# Patient Record
Sex: FEMALE | Race: WHITE | NOT HISPANIC OR LATINO | Employment: FULL TIME | URBAN - METROPOLITAN AREA
[De-identification: names, ages, dates, MRNs, and addresses within clinical notes are randomized per-mention and may not be internally consistent; named-entity substitution may affect disease eponyms.]

---

## 2022-05-19 ENCOUNTER — HOSPITAL ENCOUNTER (EMERGENCY)
Facility: HOSPITAL | Age: 27
Discharge: HOME | End: 2022-05-20
Attending: EMERGENCY MEDICINE
Payer: COMMERCIAL

## 2022-05-19 VITALS
DIASTOLIC BLOOD PRESSURE: 79 MMHG | HEIGHT: 63 IN | SYSTOLIC BLOOD PRESSURE: 141 MMHG | RESPIRATION RATE: 16 BRPM | BODY MASS INDEX: 30.12 KG/M2 | WEIGHT: 170 LBS | HEART RATE: 101 BPM | OXYGEN SATURATION: 97 % | TEMPERATURE: 98.5 F

## 2022-05-19 DIAGNOSIS — T63.481A ALLERGIC REACTION TO INSECT STING, ACCIDENTAL OR UNINTENTIONAL, INITIAL ENCOUNTER: Primary | ICD-10-CM

## 2022-05-19 PROCEDURE — 99283 EMERGENCY DEPT VISIT LOW MDM: CPT

## 2022-05-19 PROCEDURE — 99284 EMERGENCY DEPT VISIT MOD MDM: CPT | Performed by: EMERGENCY MEDICINE

## 2022-05-19 RX ORDER — HYDROXYZINE HYDROCHLORIDE 25 MG/1
25 TABLET, FILM COATED ORAL ONCE
Status: COMPLETED | OUTPATIENT
Start: 2022-05-20 | End: 2022-05-20

## 2022-05-19 NOTE — Clinical Note
Eddi Burch was seen and treated in our emergency department on 5/19/2022  Diagnosis:     Tico Vargas  may return to work on return date  She may return on this date: 05/21/2022         If you have any questions or concerns, please don't hesitate to call        Rosa Elena Montes, DO    ______________________________           _______________          _______________  Hospital Representative                              Date                                Time

## 2022-05-20 RX ORDER — LEVOTHYROXINE SODIUM 0.03 MG/1
25 TABLET ORAL DAILY
COMMUNITY

## 2022-05-20 RX ORDER — HYDROXYZINE HYDROCHLORIDE 25 MG/1
25 TABLET, FILM COATED ORAL EVERY 6 HOURS PRN
Qty: 16 TABLET | Refills: 0 | Status: SHIPPED | OUTPATIENT
Start: 2022-05-19

## 2022-05-20 RX ORDER — DULOXETIN HYDROCHLORIDE 60 MG/1
60 CAPSULE, DELAYED RELEASE ORAL DAILY
COMMUNITY

## 2022-05-20 RX ADMIN — HYDROXYZINE HYDROCHLORIDE 25 MG: 25 TABLET, FILM COATED ORAL at 00:02

## 2022-05-20 NOTE — ED PROVIDER NOTES
History  Chief Complaint   Patient presents with    Allergic Reaction     Pt reports walking outside and was "probably" bit by mosquitos on Tuesday  Pt as about 12 reddened bite marks on legs and feet  Reports itcing and pain  Denies n/v/d  Virtual doctor appoint, given prednisone but states it made it worse       31 yo F complains of pruritic, red insect bites of both legs  These appeared over the past two days when she went outside between her car and home  They are worse than previous insect bites  She had a telehealth visit today and was prescribed prednisone for this  She feels they became worse after taking prednisone  She also notes she had two brief episodes within the last two days where her eyelids kept closing even though she did not feel tired  This has resolved  Patient denies fever, intraoral swelling, dyspnea, wheeze, syncope and GI symptoms  There are no other neurologic symptoms  It has been of recent foreign travel  Denies change in medications  Patient's history of polycystic ovarian syndrome, hypothyroidism          Prior to Admission Medications   Prescriptions Last Dose Informant Patient Reported? Taking? DULoxetine (CYMBALTA) 60 mg delayed release capsule   Yes Yes   Sig: Take 60 mg by mouth in the morning  levothyroxine 25 mcg tablet   Yes Yes   Sig: Take 25 mcg by mouth in the morning  metFORMIN (GLUCOPHAGE) 500 mg tablet   Yes Yes   Sig: Take 500 mg by mouth in the morning and 500 mg in the evening  Take with meals  Facility-Administered Medications: None       Past Medical History:   Diagnosis Date    Anxiety     Depression     Factor 5 Leiden mutation, heterozygous ([facility] [address] )     Genital herpes     Human papilloma virus     Hypothyroid     PCOS (polycystic ovarian syndrome)        Past Surgical History:   Procedure Laterality Date    CERVICAL BIOPSY  W/ LOOP ELECTRODE EXCISION         History reviewed  No pertinent family history    I have reviewed and agree with the history as documented  E-Cigarette/Vaping    E-Cigarette Use Never User      E-Cigarette/Vaping Substances    Nicotine No     THC No     CBD No     Flavoring No     Other No     Unknown No      Social History     Tobacco Use    Smoking status: Current Every Day Smoker     Packs/day: 0 25     Types: Cigarettes    Smokeless tobacco: Never Used   Vaping Use    Vaping Use: Never used   Substance Use Topics    Alcohol use: Yes     Comment: rarely    Drug use: Never       Review of Systems   Constitutional: Negative for fatigue and fever  HENT: Negative for congestion, ear pain, facial swelling and sore throat  Respiratory: Negative for cough and shortness of breath  Cardiovascular: Negative for chest pain, palpitations and leg swelling  Gastrointestinal: Negative for abdominal pain, blood in stool, diarrhea and vomiting  Genitourinary: Negative for flank pain and hematuria  Musculoskeletal: Negative for back pain  Neurological: Negative for syncope  All other systems reviewed and are negative  Physical Exam  Physical Exam  Vitals and nursing note reviewed  Constitutional:       General: She is not in acute distress  Appearance: She is well-developed  She is not ill-appearing or diaphoretic  HENT:      Head: Normocephalic and atraumatic  Right Ear: External ear normal       Left Ear: External ear normal       Nose: Nose normal       Mouth/Throat:      Mouth: Mucous membranes are moist       Pharynx: Oropharynx is clear  No posterior oropharyngeal erythema  Eyes:      General: No scleral icterus  Conjunctiva/sclera: Conjunctivae normal       Pupils: Pupils are equal, round, and reactive to light  Cardiovascular:      Rate and Rhythm: Normal rate and regular rhythm  Pulses: Normal pulses  Heart sounds: Normal heart sounds  No murmur heard  Pulmonary:      Effort: Pulmonary effort is normal       Breath sounds: Normal breath sounds  No wheezing  Abdominal:      General: Bowel sounds are normal       Palpations: Abdomen is soft  Tenderness: There is no abdominal tenderness  Musculoskeletal:         General: Normal range of motion  Cervical back: Normal range of motion and neck supple  Right lower leg: No edema  Left lower leg: No edema  Lymphadenopathy:      Cervical: No cervical adenopathy  Skin:     General: Skin is warm and dry  Capillary Refill: Capillary refill takes less than 2 seconds  Findings: Rash (Scattered urticaria with central thin, tan eschar and surrounding erythema  ) present  Neurological:      General: No focal deficit present  Mental Status: She is alert and oriented to person, place, and time  Mental status is at baseline  Cranial Nerves: No cranial nerve deficit  Coordination: Coordination normal       Deep Tendon Reflexes: Reflexes are normal and symmetric  Psychiatric:         Mood and Affect: Mood normal          Behavior: Behavior normal          Vital Signs  ED Triage Vitals   Temperature Pulse Respirations Blood Pressure SpO2   05/19/22 2341 05/19/22 2341 05/19/22 2341 05/19/22 2341 05/19/22 2341   98 5 °F (36 9 °C) 101 16 141/79 97 %      Temp Source Heart Rate Source Patient Position - Orthostatic VS BP Location FiO2 (%)   05/19/22 2341 05/19/22 2341 -- 05/19/22 2341 --   Oral Monitor  Left arm       Pain Score       05/19/22 2350       8           Vitals:    05/19/22 2341   BP: 141/79   Pulse: 101         Visual Acuity      ED Medications  Medications   hydrOXYzine HCL (ATARAX) tablet 25 mg (25 mg Oral Given 5/20/22 0002)       Diagnostic Studies  Results Reviewed     None                 No orders to display              Procedures  Procedures         ED Course                               SBIRT 22yo+    Flowsheet Row Most Recent Value   SBIRT (23 yo +)    In order to provide better care to our patients, we are screening all of our patients for alcohol and drug use  Would it be okay to ask you these screening questions? Yes Filed at: 05/20/2022 0003   Initial Alcohol Screen: US AUDIT-C     1  How often do you have a drink containing alcohol? 1 Filed at: 05/20/2022 0003   2  How many drinks containing alcohol do you have on a typical day you are drinking? 0 Filed at: 05/20/2022 0003   3a  Male UNDER 65: How often do you have five or more drinks on one occasion? 0 Filed at: 05/20/2022 0003   3b  FEMALE Any Age, or MALE 65+: How often do you have 4 or more drinks on one occassion? 0 Filed at: 05/20/2022 0003   Audit-C Score 1 Filed at: 05/20/2022 0003   RENAE: How many times in the past year have you    Used an illegal drug or used a prescription medication for non-medical reasons? Never Filed at: 05/20/2022 0003                    MDM  Number of Diagnoses or Management Options  Allergic reaction to insect sting, accidental or unintentional, initial encounter: new and does not require workup     Amount and/or Complexity of Data Reviewed  Review and summarize past medical records: yes        Disposition  Final diagnoses: Allergic reaction to insect sting, accidental or unintentional, initial encounter     Time reflects when diagnosis was documented in both MDM as applicable and the Disposition within this note     Time User Action Codes Description Comment    5/19/2022 11:55 PM [employer] Add Nikita Brakeman  XXXA] Bug bite, initial encounter     5/19/2022 11:55 PM [employer] Remove [U71  XXXA] Bug bite, initial encounter     5/19/2022 11:55 PM [employer] Add [Z51 [id number]] Allergic reaction to insect sting, accidental or unintentional, initial encounter       ED Disposition     ED Disposition   Discharge    Condition   Stable    Date/Time   Thu May 19, 2022 11:55 PM    Comment   Lupis Kidd discharge to home/self care                 Follow-up Information     Follow up With Specialties Details Why Jerzy gN  Call in 1 day To find a local PCP [phone]            Discharge Medication List as of 5/20/2022 12:31 AM      START taking these medications    Details   hydrocortisone (WESTCORT) 0 2 % cream Apply topically 2 (two) times a day, Starting Fri 5/20/2022, Print      hydrOXYzine HCL (ATARAX) 25 mg tablet Take 1 tablet (25 mg total) by mouth every 6 (six) hours as needed for itching for up to 16 doses, Starting Thu 5/19/2022, Print         CONTINUE these medications which have NOT CHANGED    Details   DULoxetine (CYMBALTA) 60 mg delayed release capsule Take 60 mg by mouth in the morning , Historical Med      levothyroxine 25 mcg tablet Take 25 mcg by mouth in the morning , Historical Med      metFORMIN (GLUCOPHAGE) 500 mg tablet Take 500 mg by mouth in the morning and 500 mg in the evening  Take with meals  , Historical Med             No discharge procedures on file      PDMP Review     None          ED Provider  Electronically Signed by           Negro Rivas DO  05/21/22 9041

## 2022-05-20 NOTE — DISCHARGE INSTRUCTIONS
Take a new antihistamine such as Zyrtec or Claritin in the mornings  Try hydroxyzine in the afternoons and bedtime if needed  If you stop taking the prednisone by mouth you can start the Westcort cream   Apply a thin layer to the wounds twice a day  Buy Domeboro powder to add to water and apply to the itchy areas with cloth as directed on the box  Apply insect repellent to close and to skin  Follow-up with PCP  You can call the [employer] as directed below to find a PCP if needed

## 2022-06-07 ENCOUNTER — OFFICE VISIT (OUTPATIENT)
Dept: FAMILY MEDICINE CLINIC | Facility: CLINIC | Age: 27
End: 2022-06-07
Payer: COMMERCIAL

## 2022-06-07 VITALS
DIASTOLIC BLOOD PRESSURE: 82 MMHG | SYSTOLIC BLOOD PRESSURE: 114 MMHG | BODY MASS INDEX: 29.27 KG/M2 | WEIGHT: 165.2 LBS | HEART RATE: 81 BPM | HEIGHT: 63 IN | RESPIRATION RATE: 17 BRPM | TEMPERATURE: 98.6 F | OXYGEN SATURATION: 97 %

## 2022-06-07 DIAGNOSIS — R53.83 FATIGUE, UNSPECIFIED TYPE: ICD-10-CM

## 2022-06-07 DIAGNOSIS — Z13.6 SCREENING FOR CARDIOVASCULAR CONDITION: ICD-10-CM

## 2022-06-07 DIAGNOSIS — Z13.1 SCREENING FOR DIABETES MELLITUS: ICD-10-CM
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
## 2022-06-07 DIAGNOSIS — Z13.29 SCREENING FOR THYROID DISORDER: ICD-10-CM

## 2022-06-07 PROBLEM — F33.41 RECURRENT MAJOR DEPRESSIVE DISORDER, IN PARTIAL REMISSION (HCC): Status: ACTIVE | Noted: 2022-06-07

## 2022-06-07 PROBLEM — B97.7 HUMAN PAPILLOMA VIRUS: Status: ACTIVE | Noted: 2022-06-07

## 2022-06-07 PROBLEM — E03.9 ACQUIRED HYPOTHYROIDISM: Status: ACTIVE | Noted: 2022-06-07

## 2022-06-07 PROBLEM — D68.51 FACTOR V LEIDEN (HCC): Status: ACTIVE | Noted: 2022-06-07

## 2022-06-07 PROBLEM — A60.00 HERPES SIMPLEX INFECTION OF GENITOURINARY SYSTEM: Status: ACTIVE | Noted: 2022-06-07

## 2022-06-07 PROBLEM — F41.9 ANXIETY: Status: ACTIVE | Noted: 2022-06-07

## 2022-06-07 PROBLEM — E28.2 POLYCYSTIC OVARIAN SYNDROME: Status: ACTIVE | Noted: 2022-06-07

## 2022-06-07 PROCEDURE — 99203 OFFICE O/P NEW LOW 30 MIN: CPT | Performed by: NURSE PRACTITIONER

## 2022-06-07 PROCEDURE — 3725F SCREEN DEPRESSION PERFORMED: CPT | Performed by: NURSE PRACTITIONER

## 2022-06-07 PROCEDURE — 3008F BODY MASS INDEX DOCD: CPT | Performed by: NURSE PRACTITIONER

## 2022-06-07 RX ORDER — HYDROXYZINE HYDROCHLORIDE 10 MG/1
TABLET, FILM COATED ORAL
COMMUNITY
Start: 2022-05-19

## 2022-06-07 NOTE — PROGRESS NOTES
Assessment/Plan:    No problem-specific Assessment & Plan notes found for this encounter  Problem List Items Addressed This Visit    None     Visit Diagnoses     Bug bite, subsequent encounter    -  Primary    Screening for cardiovascular condition        Relevant Orders    CBC and differential    Comprehensive metabolic panel    Lipid panel    Screening for thyroid disorder        Relevant Orders    TSH, 3rd generation with Free T4 reflex    Screening for diabetes mellitus        Relevant Orders    UA (URINE) with reflex to Scope            Subjective:      Patient ID: Vasquez Villarreal is a 32 y o  female  Patient here today to establish care  Recently in ER for bad bug bites on 5/19/22- treated with Hydrocortisone cream and Vistaril for the itching  Patient also tried Prednisone oral steroid x 1 week for bug bites after having a tele health visit with a provider  Some of bug bites were severe and blistering  Bug bites have improved with treatments and time  Patient unsure why she is getting bit so bad compared to other family members and friends that she is with  Patient is currently following with ob/gyn- seen yesterday and has labs to get drawn; patient has not had period since March 2022 and urine pregnancy negative  Patient is going to see a fertility specialist on 7/18/22 since she would like to conceive and has been unsuccessful  Patient also follows with Endocrinologist every few months- next due in July 2022  Patient also reports some recent fatigue that has been worse over the last few weeks  Patient also follows with a psychiatrist every few months  The following portions of the patient's history were reviewed and updated as appropriate: allergies, current medications, past family history, past medical history, past social history, past surgical history and problem list     Review of Systems   Constitutional: Negative  Negative for chills, fatigue and fever  HENT: Negative    Negative for congestion, ear discharge, ear pain, rhinorrhea, sinus pressure, sinus pain and sore throat  Eyes: Negative  Negative for pain, discharge and visual disturbance  Respiratory: Negative  Negative for cough, chest tightness, shortness of breath and wheezing  Cardiovascular: Negative  Negative for chest pain, palpitations and leg swelling  Gastrointestinal: Negative  Negative for abdominal pain, constipation, diarrhea, nausea and vomiting  Endocrine: Negative  Negative for polydipsia and polyuria  Genitourinary: Negative  Negative for difficulty urinating, dysuria and hematuria  Musculoskeletal: Negative  Negative for arthralgias and myalgias  Skin: Negative  Negative for rash and wound  Scattered healing bug bites mostly on legs    Allergic/Immunologic: Negative  Negative for environmental allergies  Neurological: Negative  Negative for dizziness, seizures, syncope, light-headedness and headaches  Hematological: Bruises/bleeds easily  Psychiatric/Behavioral: Negative for dysphoric mood  The patient is nervous/anxious  Objective:      /82 (BP Location: Left arm, Patient Position: Sitting, Cuff Size: Standard)   Pulse 81   Temp 98 6 °F (37 °C) (Tympanic)   Resp 17   Ht 5' 3" (1 6 m)   Wt 74 9 kg (165 lb 3 2 oz)   LMP 03/08/2022 (Exact Date)   SpO2 97%   BMI 29 26 kg/m²          Physical Exam  Vitals reviewed  Constitutional:       General: She is not in acute distress  Appearance: Normal appearance  HENT:      Head: Normocephalic and atraumatic  Right Ear: Tympanic membrane, ear canal and external ear normal  There is no impacted cerumen  Left Ear: Tympanic membrane, ear canal and external ear normal  There is no impacted cerumen  Nose: Nose normal  No congestion or rhinorrhea  Mouth/Throat:      Mouth: Mucous membranes are moist       Pharynx: Oropharynx is clear  No oropharyngeal exudate or posterior oropharyngeal erythema  Eyes:      Extraocular Movements: Extraocular movements intact  Conjunctiva/sclera: Conjunctivae normal    Cardiovascular:      Rate and Rhythm: Normal rate and regular rhythm  Pulses: Normal pulses  Heart sounds: Normal heart sounds  Pulmonary:      Effort: Pulmonary effort is normal  No respiratory distress  Breath sounds: Normal breath sounds  No wheezing  Abdominal:      General: Bowel sounds are normal       Palpations: Abdomen is soft  Tenderness: There is no abdominal tenderness  Musculoskeletal:         General: Normal range of motion  Cervical back: Normal range of motion and neck supple  Right lower leg: No edema  Left lower leg: No edema  Lymphadenopathy:      Cervical: No cervical adenopathy  Skin:     General: Skin is warm and dry  Capillary Refill: Capillary refill takes less than 2 seconds  Comments: Scattered healing bug bites to B/L LE's and right elbow- raised pink spots, some scabbed over    Neurological:      General: No focal deficit present  Mental Status: She is alert and oriented to person, place, and time  Psychiatric:         Mood and Affect: Mood normal          Behavior: Behavior normal          Thought Content:  Thought content normal          Judgment: Judgment normal

## 2022-06-21 ENCOUNTER — TELEPHONE (OUTPATIENT)
Dept: FAMILY MEDICINE CLINIC | Facility: CLINIC | Age: 27
End: 2022-06-21

## 2022-06-21 NOTE — TELEPHONE ENCOUNTER
I called patient to let her know  She said she has a prescription Rx of cortisone and it has done nothing  She will probably do a video visit through [facility] so she can have them looked at  She is very nervous since they are not going away

## 2022-06-21 NOTE — TELEPHONE ENCOUNTER
Patient called  She said that the welts on the back of her legs has returned  She said that they are the same as before  She said they are itchy, hot and red  She doesn't know what to do  She doesn't have any time off from work  She was in tears  I told her that she probably will need to see urgent care, but I would leave a message to confirm  She is worried, because they keep returning in the same spot  I told her we would call her back  [phone]  Thank you

## 2022-07-02 LAB
ALBUMIN SERPL-MCNC: 4.6 G/DL (ref 3.9–5)
ALBUMIN/GLOB SERPL: 1.9 {RATIO} (ref 1.2–2.2)
ALP SERPL-CCNC: 69 IU/L (ref 44–121)
ALT SERPL-CCNC: 37 IU/L (ref 0–32)
APPEARANCE UR: CLEAR
AST SERPL-CCNC: 26 IU/L (ref 0–40)
BASOPHILS # BLD AUTO: 0.1 X10E3/UL (ref 0–0.2)
BASOPHILS NFR BLD AUTO: 1 %
BILIRUB SERPL-MCNC: 0.8 MG/DL (ref 0–1.2)
BILIRUB UR QL STRIP: NEGATIVE
BUN SERPL-MCNC: 9 MG/DL (ref 6–20)
BUN/CREAT SERPL: 11 (ref 9–23)
CALCIUM SERPL-MCNC: 10.3 MG/DL (ref 8.7–10.2)
CHLORIDE SERPL-SCNC: 100 MMOL/L (ref 96–106)
CHOLEST SERPL-MCNC: 192 MG/DL (ref 100–199)
CHOLEST/HDLC SERPL: 2.7 RATIO (ref 0–4.4)
CO2 SERPL-SCNC: 25 MMOL/L (ref 20–29)
COLOR UR: YELLOW
CREAT SERPL-MCNC: 0.81 MG/DL (ref 0.57–1)
EGFR: 101 ML/MIN/1.73
EOSINOPHIL # BLD AUTO: 0.4 X10E3/UL (ref 0–0.4)
EOSINOPHIL NFR BLD AUTO: 5 %
ERYTHROCYTE [DISTWIDTH] IN BLOOD BY AUTOMATED COUNT: 12 % (ref 11.7–15.4)
GLOBULIN SER-MCNC: 2.4 G/DL (ref 1.5–4.5)
GLUCOSE SERPL-MCNC: 94 MG/DL (ref 65–99)
GLUCOSE UR QL: NEGATIVE
HCT VFR BLD AUTO: 42.8 % (ref 34–46.6)
HDLC SERPL-MCNC: 72 MG/DL
HGB BLD-MCNC: 14.6 G/DL (ref 11.1–15.9)
HGB UR QL STRIP: NEGATIVE
IMM GRANULOCYTES # BLD: 0 X10E3/UL (ref 0–0.1)
IMM GRANULOCYTES NFR BLD: 0 %
KETONES UR QL STRIP: NEGATIVE
LDLC SERPL CALC-MCNC: 107 MG/DL (ref 0–99)
LEUKOCYTE ESTERASE UR QL STRIP: NEGATIVE
LYMPHOCYTES # BLD AUTO: 2.1 X10E3/UL (ref 0.7–3.1)
LYMPHOCYTES NFR BLD AUTO: 31 %
MCH RBC QN AUTO: 30.2 PG (ref 26.6–33)
MCHC RBC AUTO-ENTMCNC: 34.1 G/DL (ref 31.5–35.7)
MCV RBC AUTO: 89 FL (ref 79–97)
MICRO URNS: NORMAL
MONOCYTES # BLD AUTO: 0.4 X10E3/UL (ref 0.1–0.9)
MONOCYTES NFR BLD AUTO: 6 %
NEUTROPHILS # BLD AUTO: 3.9 X10E3/UL (ref 1.4–7)
NEUTROPHILS NFR BLD AUTO: 57 %
NITRITE UR QL STRIP: NEGATIVE
PH UR STRIP: 5.5 [PH] (ref 5–7.5)
PLATELET # BLD AUTO: 324 X10E3/UL (ref 150–450)
POTASSIUM SERPL-SCNC: 4.4 MMOL/L (ref 3.5–5.2)
PROT SERPL-MCNC: 7 G/DL (ref 6–8.5)
PROT UR QL STRIP: NEGATIVE
RBC # BLD AUTO: 4.83 X10E6/UL (ref 3.77–5.28)
SL AMB T4, FREE (DIRECT): 1.25 NG/DL (ref 0.82–1.77)
SL AMB VLDL CHOLESTEROL CALC: 13 MG/DL (ref 5–40)
SODIUM SERPL-SCNC: 140 MMOL/L (ref 134–144)
SP GR UR: 1.02 (ref 1–1.03)
TRIGL SERPL-MCNC: 70 MG/DL (ref 0–149)
TSH SERPL DL<=0.005 MIU/L-ACNC: 5.34 UIU/ML (ref 0.45–4.5)
UROBILINOGEN UR STRIP-ACNC: 0.2 MG/DL (ref 0.2–1)
WBC # BLD AUTO: 6.9 X10E3/UL (ref 3.4–10.8)

## 2022-07-05 ENCOUNTER — TELEPHONE (OUTPATIENT)
Dept: FAMILY MEDICINE CLINIC | Facility: CLINIC | Age: 27
End: 2022-07-05

## 2022-07-05 NOTE — TELEPHONE ENCOUNTER
Patient said she received a message from you on Sat with her test results  Wants to know if you can give her a call to go over them

## 2022-09-15 ENCOUNTER — TELEPHONE (OUTPATIENT)
Dept: OBGYN CLINIC | Facility: CLINIC | Age: 27
End: 2022-09-15

## 2022-09-15 NOTE — TELEPHONE ENCOUNTER
Jacoby Mckinnon can you please watch for records from Lupis's prior GYN  She was seen at [address] [place] [address]  She has no prior pregnancy and is scheduled 10/17/2022 for a 1st pn    Thanks Ruperto Jimenez

## 2022-09-17 ENCOUNTER — HOSPITAL ENCOUNTER (EMERGENCY)
Facility: HOSPITAL | Age: 27
Discharge: HOME | End: 2022-09-17
Attending: EMERGENCY MEDICINE
Payer: COMMERCIAL

## 2022-09-17 ENCOUNTER — APPOINTMENT (OUTPATIENT)
Dept: ULTRASOUND IMAGING | Facility: HOSPITAL | Age: 27
End: 2022-09-17
Payer: COMMERCIAL

## 2022-09-17 VITALS
HEIGHT: 63 IN | SYSTOLIC BLOOD PRESSURE: 114 MMHG | DIASTOLIC BLOOD PRESSURE: 69 MMHG | RESPIRATION RATE: 20 BRPM | BODY MASS INDEX: 30.12 KG/M2 | TEMPERATURE: 97.7 F | OXYGEN SATURATION: 100 % | HEART RATE: 83 BPM | WEIGHT: 170 LBS

## 2022-09-17 DIAGNOSIS — O20.0 THREATENED MISCARRIAGE: Primary | ICD-10-CM

## 2022-09-17 LAB
ABO GROUP BLD: NORMAL
ABO GROUP BLD: NORMAL
ALBUMIN SERPL BCP-MCNC: 3.9 G/DL (ref 3.5–5)
ALP SERPL-CCNC: 60 U/L (ref 46–116)
ALT SERPL W P-5'-P-CCNC: 60 U/L (ref 12–78)
ANION GAP SERPL CALCULATED.3IONS-SCNC: 9 MMOL/L (ref 4–13)
AST SERPL W P-5'-P-CCNC: 25 U/L (ref 5–45)
B-HCG SERPL-ACNC: 79 MIU/ML
BACTERIA UR QL AUTO: NORMAL /HPF
BASOPHILS # BLD AUTO: 0.05 THOUSANDS/ΜL (ref 0–0.1)
BASOPHILS NFR BLD AUTO: 1 % (ref 0–1)
BILIRUB SERPL-MCNC: 0.5 MG/DL (ref 0.2–1)
BILIRUB UR QL STRIP: NEGATIVE
BLD GP AB SCN SERPL QL: NEGATIVE
BUN SERPL-MCNC: 6 MG/DL (ref 5–25)
CALCIUM SERPL-MCNC: 9 MG/DL (ref 8.3–10.1)
CHLORIDE SERPL-SCNC: 104 MMOL/L (ref 96–108)
CLARITY UR: CLEAR
CO2 SERPL-SCNC: 27 MMOL/L (ref 21–32)
COLOR UR: YELLOW
CREAT SERPL-MCNC: 0.7 MG/DL (ref 0.6–1.3)
EOSINOPHIL # BLD AUTO: 0.26 THOUSAND/ΜL (ref 0–0.61)
EOSINOPHIL NFR BLD AUTO: 4 % (ref 0–6)
ERYTHROCYTE [DISTWIDTH] IN BLOOD BY AUTOMATED COUNT: 12.5 % (ref 11.6–15.1)
GFR SERPL CREATININE-BSD FRML MDRD: 117 ML/MIN/1.73SQ M
GLUCOSE SERPL-MCNC: 96 MG/DL (ref 65–140)
GLUCOSE UR STRIP-MCNC: NEGATIVE MG/DL
HCT VFR BLD AUTO: 43.9 % (ref 34.8–46.1)
HGB BLD-MCNC: 14.2 G/DL (ref 11.5–15.4)
HGB UR QL STRIP.AUTO: ABNORMAL
IMM GRANULOCYTES # BLD AUTO: 0.01 THOUSAND/UL (ref 0–0.2)
IMM GRANULOCYTES NFR BLD AUTO: 0 % (ref 0–2)
KETONES UR STRIP-MCNC: NEGATIVE MG/DL
LEUKOCYTE ESTERASE UR QL STRIP: NEGATIVE
LYMPHOCYTES # BLD AUTO: 1.72 THOUSANDS/ΜL (ref 0.6–4.47)
LYMPHOCYTES NFR BLD AUTO: 26 % (ref 14–44)
MCH RBC QN AUTO: 30.1 PG (ref 26.8–34.3)
MCHC RBC AUTO-ENTMCNC: 32.3 G/DL (ref 31.4–37.4)
MCV RBC AUTO: 93 FL (ref 82–98)
MONOCYTES # BLD AUTO: 0.52 THOUSAND/ΜL (ref 0.17–1.22)
MONOCYTES NFR BLD AUTO: 8 % (ref 4–12)
NEUTROPHILS # BLD AUTO: 4.04 THOUSANDS/ΜL (ref 1.85–7.62)
NEUTS SEG NFR BLD AUTO: 61 % (ref 43–75)
NITRITE UR QL STRIP: NEGATIVE
NON-SQ EPI CELLS URNS QL MICRO: NORMAL /HPF
NRBC BLD AUTO-RTO: 0 /100 WBCS
PH UR STRIP.AUTO: 7.5 [PH]
PLATELET # BLD AUTO: 379 THOUSANDS/UL (ref 149–390)
PMV BLD AUTO: 8.6 FL (ref 8.9–12.7)
POTASSIUM SERPL-SCNC: 4 MMOL/L (ref 3.5–5.3)
PROT SERPL-MCNC: 7.1 G/DL (ref 6.4–8.4)
PROT UR STRIP-MCNC: NEGATIVE MG/DL
RBC # BLD AUTO: 4.72 MILLION/UL (ref 3.81–5.12)
RBC #/AREA URNS AUTO: NORMAL /HPF
RH BLD: POSITIVE
RH BLD: POSITIVE
SODIUM SERPL-SCNC: 140 MMOL/L (ref 135–147)
SP GR UR STRIP.AUTO: 1.01 (ref 1–1.03)
SPECIMEN EXPIRATION DATE: NORMAL
UROBILINOGEN UR QL STRIP.AUTO: 0.2 E.U./DL
WBC # BLD AUTO: 6.6 THOUSAND/UL (ref 4.31–10.16)
WBC #/AREA URNS AUTO: NORMAL /HPF

## 2022-09-17 PROCEDURE — 86900 BLOOD TYPING SEROLOGIC ABO: CPT | Performed by: EMERGENCY MEDICINE

## 2022-09-17 PROCEDURE — 86901 BLOOD TYPING SEROLOGIC RH(D): CPT | Performed by: EMERGENCY MEDICINE

## 2022-09-17 PROCEDURE — 85025 COMPLETE CBC W/AUTO DIFF WBC: CPT | Performed by: EMERGENCY MEDICINE

## 2022-09-17 PROCEDURE — 84702 CHORIONIC GONADOTROPIN TEST: CPT | Performed by: EMERGENCY MEDICINE

## 2022-09-17 PROCEDURE — 99284 EMERGENCY DEPT VISIT MOD MDM: CPT | Performed by: EMERGENCY MEDICINE

## 2022-09-17 PROCEDURE — 86850 RBC ANTIBODY SCREEN: CPT | Performed by: EMERGENCY MEDICINE

## 2022-09-17 PROCEDURE — 80053 COMPREHEN METABOLIC PANEL: CPT | Performed by: EMERGENCY MEDICINE

## 2022-09-17 PROCEDURE — 36415 COLL VENOUS BLD VENIPUNCTURE: CPT | Performed by: EMERGENCY MEDICINE

## 2022-09-17 PROCEDURE — 99284 EMERGENCY DEPT VISIT MOD MDM: CPT

## 2022-09-17 PROCEDURE — 76815 OB US LIMITED FETUS(S): CPT

## 2022-09-17 PROCEDURE — 81001 URINALYSIS AUTO W/SCOPE: CPT | Performed by: EMERGENCY MEDICINE

## 2022-09-19 ENCOUNTER — TELEPHONE (OUTPATIENT)
Dept: OBGYN CLINIC | Facility: CLINIC | Age: 27
End: 2022-09-19

## 2022-09-19 ENCOUNTER — APPOINTMENT (OUTPATIENT)
Dept: LAB | Facility: HOSPITAL | Age: 27
End: 2022-09-19
Attending: EMERGENCY MEDICINE
Payer: COMMERCIAL

## 2022-09-19 DIAGNOSIS — O20.0 THREATENED MISCARRIAGE: ICD-10-CM

## 2022-09-19 DIAGNOSIS — O20.0 MISCARRIAGE, THREATENED, EARLY PREGNANCY: Primary | ICD-10-CM

## 2022-09-19 LAB — B-HCG SERPL-ACNC: 37 MIU/ML

## 2022-09-19 PROCEDURE — 36415 COLL VENOUS BLD VENIPUNCTURE: CPT

## 2022-09-19 PROCEDURE — 84702 CHORIONIC GONADOTROPIN TEST: CPT

## 2022-09-19 NOTE — TELEPHONE ENCOUNTER
Yes  This is  an SAB  Recommend that HCG is followed to negative  Repeat HCG toward the end of the week and then f/up

## 2022-09-19 NOTE — TELEPHONE ENCOUNTER
Called and spoke with patient about the results  Pt was very upset over the phone with results  She states she have PCOS and it was hard already for her to try to conceive and request an appointment early next week to further discuss  She request the order for the HCG to go to [facility] and she also would like to  an order (physical copy) in our office although she aware that the order electronically transmitted and she does not need a physical copy  Emotional support provided and pt was thankful  Transferred pt to  to cancel first prenatal appointment 10/17/22 and r/s her for a consult to follow-up SAB

## 2022-09-19 NOTE — TELEPHONE ENCOUNTER
Rec'd call from  stating that patient was seen in ER 09/17/22 and US and HCG was done  She had repeated HCG today and results was trending down, she wanted to know if she having a SAB  Hermes Jonas Advised will forward results to Dr Jorge Busch to review and provide recommendation if needed or when to follow-up  Dr Jorge Busch please address

## 2022-09-23 ENCOUNTER — APPOINTMENT (OUTPATIENT)
Dept: LAB | Facility: HOSPITAL | Age: 27
End: 2022-09-23
Attending: OBSTETRICS & GYNECOLOGY
Payer: COMMERCIAL

## 2022-09-23 DIAGNOSIS — O20.0 MISCARRIAGE, THREATENED, EARLY PREGNANCY: ICD-10-CM

## 2022-09-23 LAB — B-HCG SERPL-ACNC: 10 MIU/ML

## 2022-09-23 PROCEDURE — 36415 COLL VENOUS BLD VENIPUNCTURE: CPT

## 2022-09-23 PROCEDURE — 84702 CHORIONIC GONADOTROPIN TEST: CPT

## 2022-09-29 ENCOUNTER — OFFICE VISIT (OUTPATIENT)
Dept: OBGYN CLINIC | Facility: CLINIC | Age: 27
End: 2022-09-29

## 2022-09-29 VITALS
SYSTOLIC BLOOD PRESSURE: 120 MMHG | BODY MASS INDEX: 29.95 KG/M2 | DIASTOLIC BLOOD PRESSURE: 80 MMHG | HEIGHT: 63 IN | WEIGHT: 169 LBS

## 2022-09-29 DIAGNOSIS — N92.6 IRREGULAR BLEEDING: Primary | ICD-10-CM

## 2022-09-29 DIAGNOSIS — D68.51 FACTOR V LEIDEN (HCC): ICD-10-CM

## 2022-10-17 NOTE — PROGRESS NOTES
Assessment/Plan:      Diagnoses and all orders for this visit:    Irregular bleeding  -  Reassured patient that her U/S and previous beta HCG were negative for retained POC or pregnancy, respectively  Advised to monitor bleeding and inform Ob/Gyn if bleeding continues or increases in the next 2 weeks  Order for beta HCG provided to ensure level are negative for pregnancy  -     hCG, quantitative; Future  -     hCG, quantitative  -     TSH, 3rd generation with Free T4 reflex; Future  -     TSH, 3rd generation with Free T4 reflex    Factor V Leiden ([facility] [address] )          Subjective:     Patient ID: Giovana Pearson is a 32 y o  female  Patient presents for follow up after spontaneous  on 22  Patient states vaginal bleeding has significantly decreased since SAB but wondering how much longer would she have vaginal bleeding  Denies having pelvic discomfort or heavy bleeding  Patient with h/o irregular vaginal bleeding due to PCOS and anovulation  Review of Systems   Constitutional: Negative for activity change and unexpected weight change  Genitourinary: Positive for vaginal bleeding  Negative for difficulty urinating, dysuria, genital sores and vaginal pain  Objective:     Physical Exam  Vitals and nursing note reviewed  HENT:      Head: Normocephalic  Abdominal:      General: There is no distension  Palpations: Abdomen is soft  There is no mass  Tenderness: There is no abdominal tenderness  There is no rebound  Genitourinary:     General: Normal vulva  Exam position: Lithotomy position  Labia:         Right: No rash, tenderness or lesion  Left: No rash, tenderness or lesion  Urethra: No urethral pain or urethral lesion  Vagina: Normal  No vaginal discharge  Cervix: Cervical bleeding present  No cervical motion tenderness, lesion or erythema        Uterus: Normal        Adnexa: Right adnexa normal and left adnexa normal       Rectum: No external hemorrhoid  Comments: Cervix closed, minimal blood in vaginal vault and at external cervical os  Musculoskeletal:      Right lower leg: No edema  Left lower leg: No edema  Skin:     General: Skin is warm  Neurological:      Mental Status: She is alert and oriented to person, place, and time  Psychiatric:         Mood and Affect: Mood normal          Behavior: Behavior normal          Thought Content:  Thought content normal

## 2023-02-07 ENCOUNTER — TELEPHONE (OUTPATIENT)
Dept: OBGYN CLINIC | Facility: CLINIC | Age: 28
End: 2023-02-07

## 2023-02-07 DIAGNOSIS — O26.859 SPOTTING IN EARLY PREGNANCY: Primary | ICD-10-CM

## 2023-02-07 NOTE — TELEPHONE ENCOUNTER
Patient called stating that she is currently pregnant and around 6 wks  She had 2 (+) HPT  She is a  (h/o SAB 2022)  She is scheduled for 1st PN on 23  She states yesterday afternoon, she had some discharge that is pale pink (@ 05:45p) which had a odor of blood; around 9 pm she notice a darker pink color on the toilet paper with wiping then it went away and today when she is wiping she noticed the dark discharge on the toilet paper again  Patient was crying on the phone as she states she is nervous that something is wrong again  Denies abd cramps/pain  Blood type : O (+)  Advised pt to schedule an appointment to be seen today, patient states she can not come in at all today of tomorrow as she can not leave work due to staffing issues  Patient declined to be evaluated at the ER, due to high cost  Informed her it is important to be seen for evaluation in our office today but patient stated she can not leave work at all  Pt request blood work HCG which she can get it done early in the morning tomorrow at [facility] across the street from her home (aware takes 24 hours for results)  Advised will discuss with Dr Pati Morales (on-call) if okay for HCG  HCG order placed to [facility] per pt request  Order placed  Pt aware if any changes if heavier bleeding, abd pain/cramp to please proceed to the ER and any concerns to call back  Please leave TE open to follow HCG and forward to on-call provider on Thursday with the results once available on 23 for further recommendation

## 2023-02-09 ENCOUNTER — TELEPHONE (OUTPATIENT)
Dept: OBGYN CLINIC | Facility: CLINIC | Age: 28
End: 2023-02-09

## 2023-02-09 LAB — HCG INTACT+B SERPL-ACNC: NORMAL MIU/ML

## 2023-02-09 NOTE — TELEPHONE ENCOUNTER
HCG level is not a accurate way to specifically date pregnancy  I sent previous message to nurses she needs ultrasound to r/o ectopic pregnancy  Ultrasound should help us with Gestational age

## 2023-02-09 NOTE — TELEPHONE ENCOUNTER
Spoke with Kel Flores as to results and Dr Hugh Valdivia recommendations  Transfer her to  to schedule an appt  [facility]

## 2023-02-09 NOTE — TELEPHONE ENCOUNTER
HCG 38,000 - this is above the discriminatory zone and we should be able to confirm intrauterine pregnancy at this point  Please have her come to office today or tomorrow for ultrasound with physician to rule out ectopic  Alternative if can't come (as it appears work constraints on time before) - we could order ultrasound at outpatient center - should be done ASAP  Cannot rule out ectopic pregnancy without ultrasound and should continue ectopic precautions until ultrasound done

## 2023-02-09 NOTE — TELEPHONE ENCOUNTER
Jose M Sousa reviewed recent HCG results on MyChart  She is questioning, how many weeks pregnant she is  Jose M Sousa doubts pregnant more than 12 weeks  Sent message to Dr Mary Griffiths

## 2023-02-09 NOTE — PROGRESS NOTES
[address]   [address], [address], [facility], [facility]    Assessment/Plan:  1  Spotting in early pregnancy  Comments:  patient 6w4d by LMP  [place] discharge vaginally  HCG 38,000  A/P:   Ultrasound today in office confirms live IUP and rules out ectopic pregnancy  Pregnancy on ultrasound c/w GA by LMP   +FHT  Reviewed with pt this is reassuring but does not completely rule out possible miscarriage in the future  She understands  Has first PNV kathleen'd 2023 - keep that appointment  If change in symptoms prior to that, like rubén bleeding would recom repeat u/s  Orders:  -     AMB US OB < 14 weeks single or first gestation level 1    2  Amenorrhea  -     AMB US OB < 14 weeks single or first gestation level 1        Subjective:   Nichelle Banerjee is a 32 y o   female  CC: "worried about the baby"      HPI:   Rowan Colvin presents for early pregnancy ultrasound for spotting and history of miscarriage in past   She reports LMP 2022/  Called 2023 approx 6wks pregnancy and pink discharge   hcg 2023 38,000    Today by LMP 6 4wks with EDC 10/2/2023  Still mild pink discharge, but no bleeding, no pain  Blood type O+ confirmed during  miscarriage  Gyn History  Patient's last menstrual period was 2022 (exact date)  Last pap smear: 2021    She  reports being sexually active and has had partner(s) who are male  She reports using the following method of birth control/protection: None  OB History      Past Medical History:  No date: Anxiety and depression      Comment:  previously on Cymbalta, stopped 2022 due to cost of               visits  No date: Factor 5 Leiden mutation, heterozygous ([facility] [address] )  No date: Genital herpes  2019: History of loop electrical excision procedure (LEEP)  No date: Human papilloma virus  No date: Hypothyroid  22: Miscarriage      Comment: This was my first pregnancy   The miscarriage started Friday, 9/16/22  No date: PCOS (polycystic ovarian syndrome)     Past Surgical History:  No date: CERVICAL BIOPSY  W/ LOOP ELECTRODE EXCISION     Social History     Tobacco Use   • Smoking status: Some Days     Packs/day: 0 25     Years: 2 00     Pack years: 0 50     Types: Cigarettes   • Smokeless tobacco: Never   • Tobacco comments:     As soon as I found out that I was pregnant, I completely stopped smoking  Vaping Use   • Vaping Use: Never used   Substance Use Topics   • Alcohol use: Yes     Comment: I rarely drink   • Drug use: Never          Current Outpatient Medications:   •  levothyroxine 25 mcg tablet, Take 25 mcg by mouth in the morning , Disp: , Rfl:   •  metFORMIN (GLUCOPHAGE) 500 mg tablet, Take 500 mg by mouth in the morning and 500 mg in the evening  Take with meals  , Disp: , Rfl:   •  Multiple Vitamins-Minerals (MULTIVITAMIN GUMMIES WOMENS PO), Take by mouth, Disp: , Rfl:   •  valACYclovir (VALTREX) 500 mg tablet, if needed, Disp: , Rfl:     She has No Known Allergies       ROS: Review of Systems   Constitutional: Negative  Gastrointestinal: Negative  Genitourinary: Positive for vaginal discharge (occasional pink tinged discharge)  Negative for pelvic pain and vaginal bleeding  Psychiatric/Behavioral: Negative  Objective:  /60 (BP Location: Left arm, Patient Position: Sitting, Cuff Size: Large)   Ht 5' 3 75" (1 619 m)   Wt 73 8 kg (162 lb 12 8 oz)   LMP 12/26/2022 (Exact Date)   BMI 28 16 kg/m²      Physical Exam  Constitutional:       Appearance: Normal appearance  Genitourinary:     General: Normal vulva  Vagina: Normal       Cervix: Normal       Uterus: Normal  Enlarged (6 week)  Not tender  Adnexa:         Right: No mass or tenderness  Left: No mass or tenderness  Rectum: No external hemorrhoid  Neurological:      Mental Status: She is alert  Psychiatric:         Mood and Affect: Mood is anxious  Affect is tearful           Behavior: Behavior normal            FIRST TRIMESTER OBSTETRIC ULTRASOUND  02/10/23    Margie Wiggins MD     INDICATION: Amenorrhea, spotting    COMPARISON: none     TECHNIQUE:   Transvaginal imaging was performed to assess the gestation and myometrial/endometrial architecture       The study includes volumetric sweeps and traditional still imaging technique  FINDINGS:     A single intrauterine gestation is identified  Cardiac activity is detected at 123bpm       YOLK SAC:  Present and normal in size and appearance  MEAN CROWN RUMP LENGTH:  4 4 mm = 6 weeks 1 days   AMNIOTIC FLUID/SAC SHAPE:  Within expected normal range  UTERUS:   No uterine abnormalities noted  IMPRESSION:    Patient's last menstrual period was 12/26/2022 (exact date)  Gestational age based on LMP: 7w2d   Gestational age based on US: 5w3d     Will assign dating based on LMP which is c/w ultrasound today    Final Gestational age: 7w2d  Final Estimated Date of Delivery: 10/2/2023

## 2023-02-10 ENCOUNTER — ROUTINE PRENATAL (OUTPATIENT)
Dept: OBGYN CLINIC | Facility: CLINIC | Age: 28
End: 2023-02-10

## 2023-02-10 VITALS
SYSTOLIC BLOOD PRESSURE: 110 MMHG | DIASTOLIC BLOOD PRESSURE: 60 MMHG | BODY MASS INDEX: 27.79 KG/M2 | WEIGHT: 162.8 LBS | HEIGHT: 64 IN

## 2023-02-10 DIAGNOSIS — N91.2 AMENORRHEA: ICD-10-CM

## 2023-02-10 DIAGNOSIS — O26.859 SPOTTING IN EARLY PREGNANCY: Primary | ICD-10-CM

## 2023-02-10 RX ORDER — VALACYCLOVIR HYDROCHLORIDE 500 MG/1
TABLET, FILM COATED ORAL AS NEEDED
COMMUNITY
Start: 2023-01-04

## 2023-02-27 ENCOUNTER — INITIAL PRENATAL (OUTPATIENT)
Dept: OBGYN CLINIC | Facility: CLINIC | Age: 28
End: 2023-02-27

## 2023-02-27 ENCOUNTER — TELEPHONE (OUTPATIENT)
Dept: OBGYN CLINIC | Facility: CLINIC | Age: 28
End: 2023-02-27

## 2023-02-27 VITALS
HEIGHT: 63 IN | SYSTOLIC BLOOD PRESSURE: 118 MMHG | DIASTOLIC BLOOD PRESSURE: 70 MMHG | BODY MASS INDEX: 28.88 KG/M2 | WEIGHT: 163 LBS

## 2023-02-27 DIAGNOSIS — E03.9 ACQUIRED HYPOTHYROIDISM: ICD-10-CM

## 2023-02-27 DIAGNOSIS — O34.41 HISTORY OF CERVICAL LEEP BIOPSY AFFECTING CARE OF MOTHER, ANTEPARTUM, FIRST TRIMESTER: ICD-10-CM

## 2023-02-27 DIAGNOSIS — Z34.91 PRENATAL CARE IN FIRST TRIMESTER: Primary | ICD-10-CM

## 2023-02-27 DIAGNOSIS — D68.51 FACTOR V LEIDEN (HCC): ICD-10-CM

## 2023-02-27 DIAGNOSIS — E28.2 POLYCYSTIC OVARIAN SYNDROME: ICD-10-CM

## 2023-02-27 DIAGNOSIS — Z36.89 ENCOUNTER FOR OTHER SPECIFIED ANTENATAL SCREENING: Primary | ICD-10-CM

## 2023-02-27 DIAGNOSIS — F33.41 RECURRENT MAJOR DEPRESSIVE DISORDER, IN PARTIAL REMISSION (HCC): ICD-10-CM

## 2023-02-27 DIAGNOSIS — F41.9 ANXIETY: ICD-10-CM

## 2023-02-27 DIAGNOSIS — A60.00 HERPES SIMPLEX INFECTION OF GENITOURINARY SYSTEM: ICD-10-CM

## 2023-02-27 LAB
EXTERNAL CHLAMYDIA SCREEN: NORMAL
EXTERNAL GONORRHEA SCREEN: NORMAL
EXTERNAL HIV-1 P24 ANTIGEN: NORMAL
SL AMB  POCT GLUCOSE, UA: NEGATIVE
SL AMB POCT URINE PROTEIN: NEGATIVE

## 2023-02-27 NOTE — TELEPHONE ENCOUNTER
Lupis l/m Dr Derick Callejas wanted her to schedule with hematology due to factor V  She is looking for a [facility]'s hematologist and can only find hematology/oncology and phone # is for oncoclogy hotline  Return call to Linus Lombardi, advised hematology/oncology specialty is frequently a linked specialty  Confirmed # she has called, recommended she leave message to receive call back to schedule   Dr Derick Callejas, please generate [facility] ambulatory referral for [facility] hematology/oncolgy referral

## 2023-02-27 NOTE — ASSESSMENT & PLAN NOTE
Has been managed by Endocrinologist  Taking Levothyroxine 25 mcg daily  Previous TSH levels checked 7/2022  Will check TSH levels with 1st PN labs

## 2023-02-27 NOTE — PROGRESS NOTES
OB INTAKE INTERVIEW  Patient is 32 y  o who presents for OB intake at 9wks  She is accompanied by: Significant Other   The father of her baby Bridger Sanches) is involved in the pregnancy    Last Menstrual Period: 2022  Ultrasound: Measured 9 weeks 0 days on 2023  Estimated Date of Delivery: 10/02/2023 confirmed by ultrasound  Signs/Symptoms of Pregnancy  Current pregnancy symptoms: Breast tenderness; denies nausea and vomiting  Constipation YES  Headaches YES  Cramping/spotting YES  PICA cravings no    Diabetes-  Body mass index is 28 87 kg/m²  If patient has 1 or more, please order early 1 hour GTT  History of GDM no  BMI >30 no  History of PCOS or current metformin use YES  History of LGA/macrosomic infant (4000g/9lbs) no    If patient has 2 or more, please order early 1 hour GTT  AMA no  First degree relative with type 2 diabetes no  History of chronic HTN, hyperlipidemia, elevated A1C no  High risk race (, , ,  or ) no    Hypertension- if you answer yes, please order preeclampsia labs (cbc, comprehensive metabolic panel, urine protein creatinine ratio, uric acid)  History of of chronic HTN no  History of gestational HTN no  History of preeclampsia, eclampsia, or HELLP syndrome no  History of diabetes no  History of lupus, autoimmune disease, kidney disease, antiphospholipid syndrome, rheumatoid arthritis, sjogren's syndrome no    Thyroid- if yes order TSH with reflex T4 (Unless TSH value on file within last 4 weeks then do not need to order)  History of thyroid disease YES    Bleeding Disorder or Hx of DVT-patient or first degree relative with history of  Order the following if not done previously  (Factor V, antithrombin III, prothrombin gene mutation, protein C and S Ag, lupus anticoagulant, anticardiolipin, beta-2 glycoprotein)   YES (patient have factor 5; pt's father history of DVT and also factor 5)       OB/GYN-  History of abnormal pap smear YES  History of HPV YES  History of Herpes/HSV YES  History of other STI (gonorrhea, chlamydia, trich) (or current partner with Hep B, Hep C, or HIV) no  History of prior  no  History of prior  no  History of  delivery prior to 36 weeks 6 days no  History of blood transfusion no  Ok for blood transfusion -yes    Substance screening-   History of tobacco use YES  Currently using tobacco no  Currently using alcohol no  Presently using drugs no  Past drug use (if yes, order urine drug screening panel)  no  IV drug use (if yes, order urine drug screening panel) no  Partner drug use (if yes, order urine drug screening panel) no  Parent/Family drug use (do not order urine drug screening panel just for family hx) no    Depression Screening-  PHQ-9 Score: (3)    MRSA Screening-   Does the pt have a hx of MRSA? no  If yes- please follow MRSA protocol and obtain a nasal swab for MRSA culture at 12 week visit  Immunizations:  Influenza vaccine given this season (ask date) -No, declined  Discussed Tdap vaccine (ask date) -Yes  Discussed COVID Vaccine (request pt upload card or bring to next visit) Yes, she is not vaccinated and declined  Genetic/MFM-  Do you or your partner have a history of any of the following in yourselves or first degree relatives? Cystic fibrosis no  Spinal muscular atrophy no  Hemoglobinopathy/Sickle Cell/Thalassemia no  Fragile X Intellectual Disability no    If yes, discuss carrier screening and recommend consultation with [facility]/genetic counseling  If no, discuss option for carrier screening and/or genetic testing with Nuch[facility] Ultras[facility]  Patient interested -Patient declined genetic testing NIPT and CF and SMA carrier screening  She interested in early anatomy scan     Appointment at [facility] made -Pt will call to schedule    Interview education  [facility] P[facility] Essentials Book reviewed and discussed -Yes      Prenatal lab work scripts -Yes    Extra labs ordered:  Early 1 hr GTT, and TSH panel  The patient has a history now or in prior pregnancy notable for:  No      Details that I feel the provider should be aware of: Depression/Anxiety, Factor 5 Leiden, Genital HSV, Hypothyroid, PCOS, and History of HPV/LEEP procedure  The patient was oriented to our practice, reviewed delivering physicians and [facility] in [facility] for Delivery  All questions were answered      Interviewed by: Steve Dumont RN

## 2023-02-27 NOTE — PROGRESS NOTES
First OB Visit  [address], [address], [facility], [address]    Assessment/Plan:  Sheri Melendez is a 32y o  year old  at 9w0d who presents for initial prenatal visit  Final VIRI: 10/2/2023, by Last Menstrual Period      Supervision of normal pregnancy  - Aneuploidy screening discussed  Patient opts for sequential aneuploidy screening   - Routine cervical cancer screening: Pap Up to date  - Routine STI Screening: GC/Chlamydia sent today  HIV/Hep B/Hep C/Syphilis ordered in prenatal panel   - Patient Education: Patient was counseled regarding diet, exercise, weight gain, foods to avoid, vaccines in pregnancy, aneuploidy screening, travel precautions to include seat belt use and VTE risk reduction  She has been provided our pregnancy packet which includes how and when to contact providers, medication recommendations, dietary suggestions, breastfeeding information as well as websites for additional information, hospital and delivery concerns  Additional Pregnancy Problems:   1  Prenatal care in first trimester  -     Ambulatory Referral to Maternal Fetal Medicine; Future; Expected date: 2023  -     [facility] OB < 14 weeks single or first gestation level 1    2  Polycystic ovarian syndrome  Assessment & Plan:  Taking Metformin 500 mg BID  Early 1 Hr GTT    Orders:  -     Ambulatory Referral to Maternal Fetal Medicine; Future; Expected date: 2023  -     [facility] OB < 14 weeks single or first gestation level 1    3  Herpes simplex infection of genitourinary system  Assessment & Plan:  Previous outbreak less than a year ago  Valtrex start at 36 weeks, unless episodic outbreak prior to 36 weeks    Orders:  -     Ambulatory Referral to Maternal Fetal Medicine; Future; Expected date: 2023    4  Factor V Leiden [facility])  Assessment & Plan: Will follow up with Hematologist    Orders:  -     Ambulatory Referral to Maternal Fetal Medicine;  Future; Expected date: 2023    5  Recurrent major depressive disorder, in partial remission [facility])  -     Ambulatory Referral to Maternal Fetal Medicine; Future; Expected date: 2023    6  Acquired hypothyroidism  Assessment & Plan:  Has been managed by Endocrinologist  Taking Levothyroxine 25 mcg daily  Previous TSH levels checked 2022  Will check TSH levels with 1st PN labs  Orders:  -     Ambulatory Referral to Maternal Fetal Medicine; Future; Expected date: 2023    7  Anxiety  Assessment & Plan:  Discontinued Cymbalta in 2023      8  History of cervical LEEP biopsy affecting care of mother, antepartum, first trimester  Assessment & Plan:  History of LEEP   MFM referral given for early anatomy and aneuploidy screening, due 11-13 weeks  Next OB visit: 4 wks    Subjective:   CC:  Desires prenatal care  Malika Tolbert is a 32 y o  Nancy Dais female who presents for prenatal care  Patient's last menstrual period was 2022 (exact date)  Which would make her 9 weeks and 0 days pregnant today  Pregnancy ROS: no leakage of fluid, pelvic pain, or vaginal bleeding  no nausea/vomiting  OB History    Para Term  AB Living   2 0 0 0 1 0   SAB IAB Ectopic Multiple Live Births   1 0 0 0 0      # Outcome Date GA Lbr Dejon/2nd Weight Sex Delivery Anes PTL Lv   2 Current            1 SAB 2022     SAB         Birth Comments: chemical pregnancy       The following portions of the patient's history were reviewed and updated as appropriate: She  has a past medical history of Anxiety and depression, Factor 5 Leiden mutation, heterozygous ([facility] [address] ), Genital herpes, History of loop electrical excision procedure (LEEP) (2019), Human papilloma virus, Hypothyroid, Miscarriage (22), and PCOS (polycystic ovarian syndrome)  She  has a past surgical history that includes Cervical biopsy w/ loop electrode excision and [place] tooth extraction    Her family history includes Anxiety disorder in her father; Cancer in her father; Deep vein thrombosis in her father; Depression in her father; Thyroid disease in her mother  She  reports that she has quit smoking  Her smoking use included cigarettes  She has a 0 50 pack-year smoking history  She has never used smokeless tobacco  She reports that she does not currently use alcohol  She reports that she does not use drugs  Current Outpatient Medications   Medication Sig Dispense Refill   • levothyroxine 25 mcg tablet Take 25 mcg by mouth in the morning  • metFORMIN (GLUCOPHAGE) 500 mg tablet Take 500 mg by mouth in the morning and 500 mg in the evening  Take with meals  • Multiple Vitamins-Minerals (MULTIVITAMIN GUMMIES WOMENS PO) Take by mouth (Patient not taking: Reported on 2023)     • Prenatal Vit-Fe Fumarate-FA (PRENATAL VITAMINS PO) Take by mouth     • valACYclovir (VALTREX) 500 mg tablet if needed       No current facility-administered medications for this visit  She has No Known Allergies         Objective:  LMP 2022 (Exact Date)   Pregravid Weight/BMI: 73 9 kg (163 lb) (BMI 28 88)  Current Weight:     Total Weight Gain: 0 kg (0 lb)   Pre-Rich Vitals    Flowsheet Row Most Recent Value   Prenatal Assessment    Fetal Heart Rate 150-160   Fundal Height (cm) 9 cm   Movement Absent   Prenatal Vitals    Urine Albumin/Glucose    Dilation/Effacement/Station    Cervical Dilation 0   Cervical Effacement 0   Vaginal Drainage    Draining Fluid No   Edema          General: Well appearing, no distress  Breasts: Normal bilaterally, nontender without masses, asymmetry, or nipple discharge  Abdomen: Soft, gravid, nontender  : Urethra normal  Normal labia majora and minora  Vagina normal   No vaginal bleeding  No vaginal discharge  Cervix closed  Uterus non-tender  Extremities: Warm and well perfused  Non tender  No edema      Ultrasound: ultrasound confirmed SLIUP, see report for details

## 2023-02-27 NOTE — ASSESSMENT & PLAN NOTE
Previous outbreak less than a year ago      Valtrex start at 36 weeks, unless episodic outbreak prior to 36 weeks

## 2023-02-28 ENCOUNTER — TELEPHONE (OUTPATIENT)
Dept: HEMATOLOGY ONCOLOGY | Facility: CLINIC | Age: 28
End: 2023-02-28

## 2023-02-28 NOTE — TELEPHONE ENCOUNTER
Appointment Cancellation Or Reschedule     Person calling in Patient   If someone other than patient calling, are they listed on the communication consent form? No   Provider Dr Corazon Hale   Office Visit Date and Time  03/21/2023 @9:40AM    Office Visit Location [facility]   Did patient want to reschedule their office appointment? If so, when was it scheduled to? YES, 03/31/2023 @1PM with Roberta Medellin   Did you have STAR scheduled for this appointment? No   Do you need STAR set up for your new appointment? If yes, please send to "PATIENT RIDESHARE" pool for STAR rescheduling No   Is this patient calling to reschedule an infusion appointment? No   When is their next infusion appointment? N/A   Is this patient a Chemo patient? No   Reason for Cancellation or Reschedule Patient states she requested [facility] in the first place and doesn't know why it was scheduled in [facility]  Was No show policy reviewed with patient if patient canceling within 24 hours? Yes     If the patient is cancelling an appointment and needs their STAR Transport cancelled, please route to [address] [address]  If the patient is a treatment patient, please route this to the office nurse  If the patient is not on treatment, please route to the Clerical pool based on location  If the patient is a surgical oncology patient, please route to surg/onc clinical pool  Route message as high priority if same day cancellation

## 2023-03-01 DIAGNOSIS — D68.51 FACTOR V LEIDEN (HCC): Primary | ICD-10-CM

## 2023-03-02 LAB
C TRACH RRNA SPEC QL NAA+PROBE: NEGATIVE
N GONORRHOEA RRNA SPEC QL NAA+PROBE: NEGATIVE

## 2023-03-08 LAB
EXTERNAL ANTIBODY SCREEN: NORMAL
EXTERNAL HEMOGLOBIN: 13.1 G/DL
EXTERNAL HEPATITIS B SURFACE ANTIGEN: NORMAL
EXTERNAL HIV-1 P24 ANTIGEN: NORMAL
EXTERNAL PLATELET COUNT: 319 K/ΜL
EXTERNAL RH FACTOR: POSITIVE
EXTERNAL RUBELLA IGG QUANTITATION: 5.09
EXTERNAL SYPHILIS TOTAL IGG/IGM SCREENING: NORMAL

## 2023-03-09 LAB
ABO GROUP BLD: NORMAL
APPEARANCE UR: CLEAR
BACTERIA UR CULT: NORMAL
BACTERIA URNS QL MICRO: NORMAL
BASOPHILS # BLD AUTO: 0.1 X10E3/UL (ref 0–0.2)
BASOPHILS NFR BLD AUTO: 1 %
BILIRUB UR QL STRIP: NEGATIVE
BLD GP AB SCN SERPL QL: NEGATIVE
CASTS URNS QL MICRO: NORMAL /LPF
COLOR UR: YELLOW
EOSINOPHIL # BLD AUTO: 0.1 X10E3/UL (ref 0–0.4)
EOSINOPHIL NFR BLD AUTO: 2 %
EPI CELLS #/AREA URNS HPF: NORMAL /HPF (ref 0–10)
ERYTHROCYTE [DISTWIDTH] IN BLOOD BY AUTOMATED COUNT: 12.1 % (ref 11.7–15.4)
GLUCOSE 1H P 50 G GLC PO SERPL-MCNC: 60 MG/DL (ref 70–139)
GLUCOSE UR QL: NEGATIVE
HBV SURFACE AG SERPL QL IA: NEGATIVE
HCT VFR BLD AUTO: 39.6 % (ref 34–46.6)
HCV AB S/CO SERPL IA: NON REACTIVE
HGB BLD-MCNC: 13.1 G/DL (ref 11.1–15.9)
HGB UR QL STRIP: NEGATIVE
HIV 1+2 AB+HIV1 P24 AG SERPL QL IA: NON REACTIVE
IMM GRANULOCYTES # BLD: 0 X10E3/UL (ref 0–0.1)
IMM GRANULOCYTES NFR BLD: 0 %
KETONES UR QL STRIP: NEGATIVE
LEUKOCYTE ESTERASE UR QL STRIP: NEGATIVE
LYMPHOCYTES # BLD AUTO: 1.5 X10E3/UL (ref 0.7–3.1)
LYMPHOCYTES NFR BLD AUTO: 20 %
Lab: NO GROWTH
MCH RBC QN AUTO: 29.4 PG (ref 26.6–33)
MCHC RBC AUTO-ENTMCNC: 33.1 G/DL (ref 31.5–35.7)
MCV RBC AUTO: 89 FL (ref 79–97)
MICRO URNS: NORMAL
MICRO URNS: NORMAL
MONOCYTES # BLD AUTO: 0.4 X10E3/UL (ref 0.1–0.9)
MONOCYTES NFR BLD AUTO: 6 %
NEUTROPHILS # BLD AUTO: 5.3 X10E3/UL (ref 1.4–7)
NEUTROPHILS NFR BLD AUTO: 71 %
NITRITE UR QL STRIP: NEGATIVE
PH UR STRIP: 6.5 [PH] (ref 5–7.5)
PLATELET # BLD AUTO: 319 X10E3/UL (ref 150–450)
PROT UR QL STRIP: NEGATIVE
RBC # BLD AUTO: 4.45 X10E6/UL (ref 3.77–5.28)
RBC #/AREA URNS HPF: NORMAL /HPF (ref 0–2)
RH BLD: POSITIVE
RPR SER QL: NON REACTIVE
RUBV IGG SERPL IA-ACNC: 5.09 INDEX
SL AMB T4, FREE (DIRECT): 1.15 NG/DL (ref 0.82–1.77)
SP GR UR: 1.02 (ref 1–1.03)
TSH SERPL DL<=0.005 MIU/L-ACNC: 5.92 UIU/ML (ref 0.45–4.5)
UROBILINOGEN UR STRIP-ACNC: 0.2 MG/DL (ref 0.2–1)
WBC # BLD AUTO: 7.4 X10E3/UL (ref 3.4–10.8)
WBC #/AREA URNS HPF: NORMAL /HPF (ref 0–5)

## 2023-03-10 NOTE — RESULT ENCOUNTER NOTE
Spoke with patient and informed her TSH level is elevated and discuss with her Endocrinologist at her appointment on 3/13/23  Pt confirmed she is aware of her levels and will further address on 3/13/23

## 2023-03-10 NOTE — PROGRESS NOTES
Chief Complaint   Patient presents with   • Hypothyroidism      Referring Provider  Adeline Delgado address on file     History of Present Illness:   Esvin Merritt is a 32 y o  female with subclinical hypothyroidism, multinodular thyroid s/p FNA of 0 9cm right thyroid nodule in 08/2019 with neg results with other PMHx of PCOS since age 19 who presents today to transfer care from her previous Endocrine and is currently 12 weeks pregnant  Subclinical hypothyroidism:- Reports being diagnosed with thyroid issue in 2017 after her father was diagnosed with factor 5 leiden def and therefore she underwent workup as well and was found to have hypothyroidism  Started on 25mcg levothyroxine and remained on this since  Reports taking it at night along with her prenatals  Does report feeling tired- all the times, denies excessive hair loss, constipation  Currently pregnant at 12 weeks  Dose of levothyroxine NOT increased during pregnancy yet  Most recent TFT 03/23 TSH 5 9 and free T4 1 15    Dx PCOS- age 19  Reports always had irregular menses which was well controlled with OCP, taken off this since was dx with factor 5 leiden def, started on IUD instead, patient also reports having issues with hirsutism with need to pluck hair from face every day  Patient unsure if PCOS mimickers ruled out in past  This is her first pregnancy- did not have to undergo ovulation induction tx for this, naturally conceived  Currently on metformin for PCOS management, never been on aldactone in past  Recently had a 1hr OGTT and was normal  Following with Ob for BG management during pregnancy    Dx of Non toxic Multinodular goiter:- Patient reports was diagnosed with this based on exam and had to have 2 US done in 2018/2019- we only have results for 1, had FNA done twice as well but reports in file only for 1 right sided nodule 0 9cm in side- neg for malignancy in 2019   Currently denies any dysphagia, odynophagia, no radiation to neck, no family hx of thyroid cancer  Used to be smoker- quit when she learned was pregnant  Social Hx:- Customer service for [employer]  Does not drink alcohol, no smoking anymore, no other drugs   Fhx:   thyroid disorder- Mother had thyroid issues  Possible PCOS in mother  Patient Active Problem List   Diagnosis   • Anxiety   • Recurrent major depressive disorder, in partial remission ([facility] [address] )   • Acquired hypothyroidism   • Polycystic ovarian syndrome   • Factor V Leiden ([facility] )   • Herpes simplex infection of genitourinary system   • Human papilloma virus   • History of cervical LEEP biopsy affecting care of mother, antepartum, first trimester      Past Medical History:   Diagnosis Date   • Anxiety and depression     previously on Cymbalta, stopped before fall 2022 due to cost of visits  Currently no medication and self-manage  • Factor 5 Leiden mutation, heterozygous [facility])    • Genital herpes     Last out break per pt months ago (02/27/23)  On Valtrex only w/outbreak  • History of loop electrical excision procedure (LEEP) 05/2019   • Human papilloma virus    • Hypothyroid     Currently on Levothyroxine 25mg and in the process of finding a new endocrinologist  Does not remember when last TSH check  • Miscarriage 9/16/22    This was my first pregnancy  The miscarriage started Friday, 9/16/22   • PCOS (polycystic ovarian syndrome)     Currently on metformin 500 mg x2 daily        Past Surgical History:   Procedure Laterality Date   • CERVICAL BIOPSY  W/ LOOP ELECTRODE EXCISION     • WISDOM TOOTH EXTRACTION        Family History   Problem Relation Age of Onset   • Thyroid disease Mother    • Hypothyroidism Mother    • Depression Father    • Cancer Father         Multiple Myloma   • Anxiety disorder Father    • Deep vein thrombosis Father      Social History     Tobacco Use   • Smoking status: Former     Packs/day: 0 25     Years: 2 00     Pack years: 0 50     Types: Cigarettes   • Smokeless tobacco: Never   • Tobacco comments:     As soon as I found out that I was pregnant, I completely stopped smoking  Substance Use Topics   • Alcohol use: Not Currently     Comment: I rarely drink     No Known Allergies      Current Outpatient Medications:   •  levothyroxine (Euthyrox) 75 mcg tablet, Take 1 tablet (75 mcg total) by mouth daily, Disp: 60 tablet, Rfl: 1  •  metFORMIN (GLUCOPHAGE) 500 mg tablet, Take 500 mg by mouth in the morning and 500 mg in the evening  Take with meals  , Disp: , Rfl:   •  Prenatal Vit-Fe Fumarate-FA (PRENATAL VITAMINS PO), Take by mouth, Disp: , Rfl:   •  valACYclovir (VALTREX) 500 mg tablet, if needed Takes when needed, Disp: , Rfl:   •  Multiple Vitamins-Minerals (MULTIVITAMIN GUMMIES WOMENS PO), Take by mouth (Patient not taking: Reported on 2/27/2023), Disp: , Rfl:   Review of Systems   Constitutional: Positive for fatigue  Negative for unexpected weight change  HENT: Negative for trouble swallowing and voice change  Eyes: Negative for photophobia and visual disturbance  Gastrointestinal: Negative for abdominal pain, constipation, diarrhea and nausea  Endocrine: Negative for cold intolerance and heat intolerance  Genitourinary: Negative  Musculoskeletal: Negative  Psychiatric/Behavioral: Negative  Physical Exam:  Body mass index is 29 37 kg/m²    /68   Pulse 98   Ht 5' 3" (1 6 m)   Wt 75 2 kg (165 lb 12 8 oz)   LMP 12/26/2022 (Exact Date)   BMI 29 37 kg/m²    Wt Readings from Last 3 Encounters:   03/13/23 75 2 kg (165 lb 12 8 oz)   02/27/23 73 9 kg (163 lb)   02/10/23 73 8 kg (162 lb 12 8 oz)       GEN: NAD  Eyes: no stare or proptosis, nl lids and conjunctiva, EOMI  Neck: trachea midline, thyroid nodule palpated b/l, no cervical LAD  CV; heart reg rate s1s2 nl, no m/r/g appreciated, no PORFIRIO  Resp: CTAB, good effort  Ab+BS  Neuro: no tremor, 2+ DTRs in BUE  MS: no c/c in digits, moves all 4 ext, nl muscle bulk, gait nl  Skin: warm and dry, no palmar erythema  Psych: nl mood and affect, no gross lapses in memory    DATA:  Labs:      Latest Reference Range & Units 22 07:56 23 08:16   TSH, POC 0 450 - 4 500 uIU/mL 5 340 (H) 5 920 (H)   (H): Data is abnormally high     Latest Reference Range & Units 22 07:56 23 08:16   T4,Free (Direct) 0 82 - 1 77 ng/dL 1 25 1 15     Radiology-   2018      Addendum by Saray Traylor MD on 2019 5:16 PM EDT  THE PREVIOUS REPORT AND SUBSEQUENT ADDENDUM REPORT DICTATED BY DR Christiana Montoya    ADDENDUM: Cytology results as follows: Benign follicular nodule [[place]   category 2]         Imaging Results - US THYROID NEEDLE ASP (2019 1:34 PM EDT)  Impressions   2019 2:14 PM EDT    Successful ultrasound guided fine needle biopsy of nodule/s in the thyroid gland  The patient is planning on calling their physician in several days to learn the pathology results        Imaging Results - US THYROID NEEDLE ASP (2019 1:34 PM EDT)  Narrative   2019 2:14 PM EDT    KAREN Hernandez UI: [id number] : 1995 F    [id number]    ULTRASOUND GUIDED THYROID BIOPSY    CLINICAL INDICATION: Abnormal thyroid  COMMENT: The patient's recently identified nodule in the right lobe of the thyroid gland was localized  Nodule measures 0 9 x 0 9 x 0 7 cm  Following informed consent, cleansing and topical anesthesia of the skin, 5 separate 25-gauge fine needle  aspirates were performed under ultrasound guidance  All samples were given to pathology for cytology and were placed in tubes for Afirma study as needed  Material obtained was reviewed immediately by pathology and it was felt adequate samples had been obtained  The patient tolerated the procedure without complication and left the department in good condition with instructions as to how to proceed should  complications develop       2019  echnique: A sonogram of the thyroid gland was performed assessing gray-scale appearance and color doppler flow  DISCUSSION: Comparison in May with prior thyroid ultrasound dated   4/13/2018  The right thyroid lobe measures 4 7 x 1 7 x 1 3 cm  The left thyroid   lobe measures 4 1 x 1 4 x 1 1 cm  Isthmus measures 4mm in maximum AP   diameter  There is a hypoechoic irregular marginated 1 2 cm mass in   the right superior thyroid previously measuring 1 cm  Given the   irregular margins as well as interval increase in size, this is mildly   suspicious in nature and biopsy is recommended  There is also a stable   1 3 cm hypoechoic well marginated mass in the left superior thyroid        No adjacent enlarged lymph nodes are seen  IMPRESSION:   1  Mildly suspicious 1 2 cm irregularly marginated hypoechoic nodule   in the right superior thyroid for which ultrasound guided FNA is   recommended  2  Stable 1 3 cm well marginated left superior thyroid nodule  U CONSENSUS PANEL RECOMMENDATIONS FOR THE BIOPSY OF THYROID NODULES:     1  Solid of 1cm or larger that have microcalcifications   2  Solid or nodules with coarse calcifications that are 1 5 cm or   larger   3  Mixed solid and cystic or almost entirely cystic with solid mural   component of 2cm or larger   4  Growing nodules   5  Suspicious adenopathy overrides these nodule features and should   prompt FNA of either the lymph node or the thyroid nodule  Impression:  1  Subclinical hypothyroidism    2  Thyroid nodule    3  Polycystic ovarian syndrome         Plan:    Stephanie Branham was seen today for hypothyroidism  Diagnoses and all orders for this visit:    Subclinical hypothyroidism  -     levothyroxine (Euthyrox) 75 mcg tablet; Take 1 tablet (75 mcg total) by mouth daily  -     TSH, 3rd generation with Free T4 reflex; Future  -     T4, free Lab Collect; Future  -     TSH, 3rd generation with Free T4 reflex  -     T4, free Lab Collect    Thyroid nodule  -     US thyroid;  Future    Polycystic ovarian syndrome      1  Subclinical Hypothyroidism, likely due to Hashimoto:- Patient currently 12 weeks pregnant, TSH goal for optimization for pregnancy is <2 5 in first trimester and <3 0 in 2/3rd trimester  Currently experiencing fatigue which I discussed can be pregnancy related given her Free t4 normal indicating has subclinical hypothyroidism  However given pregnancy will need to optimize thyroid control and therefore will increase levothyroxine to 1mcg/kg dose = 75mcg daily  Discussed to please take this in AM and not in PM along with her prenatal to avoid issues with malabsorption  Repeat TFT in 4 weeks  2  Multinodular thyroid- Non toxic, s/p FNA in 2019 of right thyroid nodule- neg FNA  Patient low risk for thyroid cancer but given palpated thyroid nodules b/l and last US in 2019 will repeat US now to monitor these nodules  Discussed urgent FNA only needed if nodules of concern- and would have to wait for 2nd trimester to do this anyway's     3  PCOS:- Dx in 2014, given irregular menses and hirsutism- most likely, but unsure if PCOS mimickers ruled out, Will check 17-hydroxy later after pregnancy to rule out  Discussed PCOS management per symptomatic management, given she is currently pregnant- does not need OCP to manage menses or need aldactone for hirsutism given teratogenic  Also able to manage hirsutism w/o aldactone therefore ok to continue this for now  Will obtain PCOS mimickers workup, Lipid and HbA1C after pregnancy  Given pregnancy cannot check prolactin  Discussed ok to stop metformin if Ob team wants too- unless needed for glycemic control, metformin used in PCOS for ovulation induction and menstral regulation which she currently does not need for  Can stop not and resume after pregnancy if needed vs ok to just have IUD for menstrual regulation  RTC in 4 weeks     Discussed with the patient and all questioned fully answered  She will call me if any problems arise          Fatimah Lynne MD

## 2023-03-13 ENCOUNTER — OFFICE VISIT (OUTPATIENT)
Dept: ENDOCRINOLOGY | Facility: HOSPITAL | Age: 28
End: 2023-03-13

## 2023-03-13 VITALS
BODY MASS INDEX: 29.38 KG/M2 | DIASTOLIC BLOOD PRESSURE: 68 MMHG | HEART RATE: 98 BPM | WEIGHT: 165.8 LBS | HEIGHT: 63 IN | SYSTOLIC BLOOD PRESSURE: 120 MMHG

## 2023-03-13 DIAGNOSIS — E28.2 POLYCYSTIC OVARIAN SYNDROME: ICD-10-CM

## 2023-03-13 DIAGNOSIS — E03.8 SUBCLINICAL HYPOTHYROIDISM: Primary | ICD-10-CM

## 2023-03-13 DIAGNOSIS — E04.1 THYROID NODULE: ICD-10-CM

## 2023-03-13 RX ORDER — LEVOTHYROXINE SODIUM 0.07 MG/1
75 TABLET ORAL DAILY
Qty: 60 TABLET | Refills: 1 | Status: SHIPPED | OUTPATIENT
Start: 2023-03-13

## 2023-03-20 ENCOUNTER — TELEPHONE (OUTPATIENT)
Dept: OBGYN CLINIC | Facility: CLINIC | Age: 28
End: 2023-03-20

## 2023-03-20 NOTE — TELEPHONE ENCOUNTER
Called Dr Hilda Chu office spoke with Corinne Negrete and provided her with the message as written by Dr Araiza Most  She will pass the message along to Dr Alem Amador and if any questions will call back

## 2023-03-20 NOTE — TELEPHONE ENCOUNTER
Rec'd voicemail message from patient's dentist Frances Sawyer ([facility]) requesting a call back in regards to a medication question  He wanted to know if patient can be prescribed a muscle relaxer for severe pain from TMJ during pregnancy  Dr Nuno [place] please advise

## 2023-03-20 NOTE — TELEPHONE ENCOUNTER
Would try to avoid this as a first line treatment  If needed flexaril can be used in low doses in the first and second trimester  Would recommend the lowest dose for the shortest length of time

## 2023-03-22 ENCOUNTER — TELEPHONE (OUTPATIENT)
Dept: OBGYN CLINIC | Facility: CLINIC | Age: 28
End: 2023-03-22

## 2023-03-22 NOTE — TELEPHONE ENCOUNTER
Maria R Case called with multiple questions about genetic screening  Reviewed CF and SMA testing offered and can be added to her prenatal bloodwork if she is interested  Provided CPT codes for her to check coverage with insurance  Explained early anatomy screening, bloodwork for her to check fetal DNA to detect risk factor for  down's syndrome, congenital heart defect and 2nd part of testing for spina bifida  She has concerns about FOB prior children with autism  Encouraged her to discuss additional genetic concerns with MFM at upcoming appt on 3/31/2023  Patient verbalized understanding and agreement

## 2023-03-25 ENCOUNTER — HOSPITAL ENCOUNTER (OUTPATIENT)
Dept: ULTRASOUND IMAGING | Facility: HOSPITAL | Age: 28
Discharge: HOME | End: 2023-03-25

## 2023-03-25 DIAGNOSIS — E04.1 THYROID NODULE: ICD-10-CM

## 2023-03-26 NOTE — PATIENT INSTRUCTIONS
Thank you for choosing us for your  care today  If you have any questions about your ultrasound or care, please do not hesitate to contact us or your primary obstetrician  Some general instructions for your pregnancy are:    Exercise: Aim for 22 minutes per day (150 minutes per week) of regular exercise  Walking is great! Nutrition: Choose healthy sources of calcium, iron, and protein  Learn about Preeclampsia: preeclampsia is a common, serious high blood pressure complication in pregnancy  A blood pressure of [id number] (systolic or top number) or 95GRGW (diastolic or bottom number) is not normal and needs evaluation by your doctor  Aspirin is sometimes prescribed in early pregnancy to prevent preeclampsia in women with risk factors - ask your obstetrician if you should be on this medication  For more resources, visit:  [employer] fi  If you smoke, try to reduce how many cigarettes you smoke or try to quit completely  Do not vape  Other warning signs to watch out for in pregnancy or postpartum: chest pain, obstructed breathing or shortness of breath, seizures, thoughts of hurting yourself or your baby, bleeding, a painful or swollen leg, fever, or headache (see [employer] POST-BIRTH Warning Signs campaign)  If these happen call 911  Itching is also not normal in pregnancy and if you experience this, especially over your hands and feet, potentially worse at night, notify your doctors

## 2023-03-28 NOTE — PROGRESS NOTES
Hematology/Oncology Outpatient Consult Note  Alex Bacon 32 y o  female MRN: @ Encounter: [id number]        Date:  3/31/2023        CC: Family History Factor V Leiden        HPI:  Alex Bacon is a 57-year-old female with a history of hypothyroid, anxiety who is currently pregnant, approximately 13 weeks gestation who is referred to hematology for history of factor V Leiden  She is being seen for initial consultation 3/31/2023     Patient reports her father was diagnosed with factor V Leiden mutation after developing multiple lower extremity DVTs which may have been provoked in setting of active malignancy  Patient's father is in treatment for multiple myeloma  She states she did have factor V Leiden testing herself many years ago and also believes this to be positive  She does not have a record of these test results  Patient herself has never had a VTE  No personal history of PE or DVT    Patient states her due date is 10/2/2023  She has a history of 1 prior miscarriage  Overall, she is feeling well and this pregnancy is going well without any complication  She is a former smoker  Test Results:    Imaging: AMB US OB < 14 weeks single or first gestation level 1    Result Date: 2/27/2023  Narrative: FIRST TRIMESTER OBSTETRIC ULTRASOUND Date of study: 2/27/2023 Performed by: Raymond Bush DO  INDICATION: Amenorrhea, viability COMPARISON: None  TECHNIQUE:   Transvaginal imaging was performed to assess the gestation, myometrial/endometrial architecture and ovarian parenchymal detail  The study includes volumetric sweeps and traditional still imaging technique  FINDINGS:  A single intrauterine gestation is identified  Cardiac activity is detected  YOLK SAC:  Present and normal in size and appearance  MEAN CROWN RUMP LENGTH:  22 9 mm = 9 weeks 0 days AMNIOTIC FLUID/SAC SHAPE:  Within expected normal range  UTERUS/ADNEXA: No adnexal mass or pathologic cyst  No free fluid identified  Impression: Patient's last menstrual period was 12/26/2022 (exact date)  Gestational age based on LMP: 11w0d Gestational age based on US: 11w0d Will assign dating based on LMP & U/S Final Gestational age: 11w0d Final Estimated Date of Delivery: 10/2/2023 Fetal cardiac activity detected  No adnexal masses seen  Nancy Harley DO 2/27/2023 10:19 AM         Labs:   Lab Results   Component Value Date    WBC 7 4 03/08/2023    HGB 13 1 03/08/2023    HCT 39 6 03/08/2023    MCV 89 03/08/2023     03/08/2023     Lab Results   Component Value Date    K 4 0 09/17/2022     09/17/2022    CO2 27 09/17/2022    BUN 6 09/17/2022    CREATININE 0 70 09/17/2022    CALCIUM 9 0 09/17/2022    AST 25 09/17/2022    ALT 60 09/17/2022    ALKPHOS 60 09/17/2022    EGFR 117 09/17/2022             ROS:  Review of Systems   All other systems reviewed and are negative  Active Problems:   Patient Active Problem List   Diagnosis   • Anxiety   • Recurrent major depressive disorder, in partial remission ([facility] )   • Acquired hypothyroidism   • Polycystic ovarian syndrome   • Factor V Leiden ([facility] )   • Herpes simplex infection of genitourinary system   • Human papilloma virus   • History of cervical LEEP biopsy affecting care of mother, antepartum, first trimester   • Thyroid disease during pregnancy in first trimester   • 13 weeks gestation of pregnancy   • Genital herpes simplex virus (HSV) infection in mother affecting pregnancy   • Multinodular thyroid       Past Medical History:   Past Medical History:   Diagnosis Date   • Anxiety and depression     previously on Cymbalta, stopped before fall 2022 due to cost of visits  Currently no medication and self-manage  • Factor 5 Leiden mutation, heterozygous [facility])    • Genital herpes     Last out break per pt months ago (02/27/23)  On Valtrex only w/outbreak     • History of loop electrical excision procedure (LEEP) 05/2019   • Human papilloma virus    • Hypothyroid     Currently on Levothyroxine 25mg and in the process of finding a new endocrinologist  Does not remember when last TSH check  • Miscarriage 9/16/22    This was my first pregnancy  The miscarriage started Friday, 9/16/22   • PCOS (polycystic ovarian syndrome)     Currently on metformin 500 mg x2 daily  Surgical History:   Past Surgical History:   Procedure Laterality Date   • CERVICAL BIOPSY  W/ LOOP ELECTRODE EXCISION     • WISDOM TOOTH EXTRACTION         Family History:    Family History   Problem Relation Age of Onset   • Thyroid disease Mother    • Hypothyroidism Mother    • Depression Father    • Cancer Father         Multiple Myloma   • Anxiety disorder Father    • Deep vein thrombosis Father        Cancer-related family history includes Cancer in her father  Social History:   Social History     Socioeconomic History   • Marital status: Single     Spouse name: Not on file   • Number of children: Not on file   • Years of education: Not on file   • Highest education level: Not on file   Occupational History   • Not on file   Tobacco Use   • Smoking status: Former     Packs/day: 0 25     Years: 2 00     Pack years: 0 50     Types: Cigarettes     Passive exposure: Never   • Smokeless tobacco: Never   • Tobacco comments:     As soon as I found out that I was pregnant, I completely stopped smoking     Vaping Use   • Vaping Use: Never used   Substance and Sexual Activity   • Alcohol use: Not Currently     Comment: I rarely drink   • Drug use: Never   • Sexual activity: Yes     Partners: Male     Birth control/protection: None     Comment: no new partner in past year   Other Topics Concern   • Not on file   Social History Narrative   • Not on file     Social Determinants of Health     Financial Resource Strain: Not on file   Food Insecurity: Not on file   Transportation Needs: Not on file   Physical Activity: Not on file   Stress: Not on file   Social Connections: Not on file   Intimate Partner Violence: Not At Risk   • Fear of Current or Ex-Partner: No   • Emotionally Abused: No   • Physically Abused: No   • Sexually Abused: No   Housing Stability: Not on file       Current Medications:   Current Outpatient Medications   Medication Sig Dispense Refill   • levothyroxine (Euthyrox) 112 mcg tablet Take 1 tablet (112 mcg total) by mouth daily 60 tablet 1   • Magnesium 400 MG CAPS Take 400 mg by mouth daily at bedtime     • metFORMIN (GLUCOPHAGE) 500 mg tablet Take 500 mg by mouth in the morning and 500 mg in the evening  Take with meals  • Prenatal Vit-Fe Fumarate-FA (PRENATAL VITAMINS PO) Take by mouth     • valACYclovir (VALTREX) 500 mg tablet if needed Takes when needed       No current facility-administered medications for this visit  Allergies: No Known Allergies      Physical Exam:    Body surface area is 1 78 meters squared  Wt Readings from Last 3 Encounters:   03/31/23 74 4 kg (164 lb)   03/31/23 74 4 kg (164 lb)   03/31/23 74 4 kg (164 lb)        Temp Readings from Last 3 Encounters:   03/31/23 98 °F (36 7 °C) (Temporal)   09/17/22 97 7 °F (36 5 °C) (Temporal)   06/07/22 98 6 °F (37 °C) (Tympanic)        BP Readings from Last 3 Encounters:   03/31/23 130/80   03/31/23 120/70   03/31/23 106/62         Pulse Readings from Last 3 Encounters:   03/31/23 88   03/31/23 100   03/31/23 104          Physical Exam  Constitutional:       General: She is not in acute distress  Appearance: Normal appearance  HENT:      Head: Normocephalic and atraumatic  Eyes:      General: No scleral icterus  Right eye: No discharge  Left eye: No discharge  Conjunctiva/sclera: Conjunctivae normal    Cardiovascular:      Rate and Rhythm: Normal rate and regular rhythm  Pulmonary:      Effort: Pulmonary effort is normal  No respiratory distress  Breath sounds: Normal breath sounds  Abdominal:      General: Bowel sounds are normal  There is no distension  Palpations: Abdomen is soft  There is no mass  Tenderness: There is no abdominal tenderness  Musculoskeletal:         General: Normal range of motion  Lymphadenopathy:      Cervical: No cervical adenopathy  Upper Body:      Right upper body: No supraclavicular, axillary or pectoral adenopathy  Left upper body: No supraclavicular, axillary or pectoral adenopathy  Skin:     General: Skin is warm and dry  Neurological:      General: No focal deficit present  Mental Status: She is alert and oriented to person, place, and time  Psychiatric:         Mood and Affect: Mood normal          Behavior: Behavior normal               Assessment/ Plan:    1  Factor V Leiden [facility])    Patient is a 64-year-old female who reports a family history of factor V Leiden in her father  Patient herself also reports she was tested and found to be positive for factor V Leiden  She does not have these records as this testing was done many years ago  We are unsure if she is heterozygous or homozygous for this mutation  I will check factor V Leiden testing  She herself has never had a blood clot  She is a former smoker  I reviewed with her today that recommendations for antepartum management for individuals with inherited thrombophilia such as factor V Leiden with no personal history of VTE is for surveillance without prophylactic anticoagulation  Postpartum, if she were to have  birth we would recommend anticoagulation with Lovenox for 6 weeks  I will call her with the results of her testing  She is following with maternal-fetal medicine and will discuss further if they wish her to receive anticoagulation in the postpartum period  We will happily see her back towards the end of her pregnancy if this is the recommendation  Otherwise, she will follow with us on an as-needed basis  Patient was in agreement with this plan of care    Barriers: None  Patient  able to self care      Portions of the record may have been created with voice "recognition software  Occasional wrong word or \"sound a like\" substitutions may have occurred due to the inherent limitations of voice recognition software  Read the chart carefully and recognize, using context, where substitutions have occurred            "

## 2023-03-29 ENCOUNTER — ROUTINE PRENATAL (OUTPATIENT)
Dept: OBGYN CLINIC | Facility: CLINIC | Age: 28
End: 2023-03-29

## 2023-03-29 VITALS
DIASTOLIC BLOOD PRESSURE: 70 MMHG | WEIGHT: 165 LBS | HEIGHT: 63 IN | BODY MASS INDEX: 29.23 KG/M2 | SYSTOLIC BLOOD PRESSURE: 114 MMHG

## 2023-03-29 DIAGNOSIS — Z3A.13 13 WEEKS GESTATION OF PREGNANCY: ICD-10-CM

## 2023-03-29 DIAGNOSIS — O99.281 THYROID DISEASE DURING PREGNANCY IN FIRST TRIMESTER: Primary | ICD-10-CM

## 2023-03-29 DIAGNOSIS — O34.41 HISTORY OF CERVICAL LEEP BIOPSY AFFECTING CARE OF MOTHER, ANTEPARTUM, FIRST TRIMESTER: ICD-10-CM

## 2023-03-29 DIAGNOSIS — A60.09 GENITAL HERPES SIMPLEX VIRUS (HSV) INFECTION IN MOTHER AFFECTING PREGNANCY: ICD-10-CM

## 2023-03-29 DIAGNOSIS — E07.9 THYROID DISEASE DURING PREGNANCY IN FIRST TRIMESTER: Primary | ICD-10-CM

## 2023-03-29 DIAGNOSIS — O98.319 GENITAL HERPES SIMPLEX VIRUS (HSV) INFECTION IN MOTHER AFFECTING PREGNANCY: ICD-10-CM

## 2023-03-29 LAB
SL AMB  POCT GLUCOSE, UA: NORMAL
SL AMB POCT URINE PROTEIN: NORMAL
TSH SERPL DL<=0.005 MIU/L-ACNC: 5.67 UIU/ML (ref 0.45–4.5)

## 2023-03-29 NOTE — ASSESSMENT & PLAN NOTE
Has follow up with endocrine on Friday  TSH still high  Recommend continuing to increase dose with goal of TSH below 3 in 2nd and 3rd trimesters

## 2023-03-29 NOTE — ASSESSMENT & PLAN NOTE
Reviewed headaches in pregnancy  Recommend tylenol, rest, hydration  Reviewed caffeine  Also reviewed Magnesium and Riboflavin  Reviewed genetic testing options with patient including sequential screen vs cell free DNA  Patient is unsure about genetic testing at this time  Will review further with  center at appointment 3/31/2023  Reviewed if decides on NIPT would recommend AFP at 15-18 weeks to evaluate for ONTD  Return to office for ob check in 4 weeks

## 2023-03-29 NOTE — PROGRESS NOTES
Routine Prenatal Visit   [address] [facility], [address]    Assessment/Plan:  Fozia Samaniego is a 32y o  year old  at 13w2d who presents for routine prenatal visit  1  Thyroid disease during pregnancy in first trimester  Assessment & Plan:  Has follow up with endocrine on Friday  TSH still high  Recommend continuing to increase dose with goal of TSH below 3 in 2nd and 3rd trimesters  2  13 weeks gestation of pregnancy  Assessment & Plan:  Reviewed headaches in pregnancy  Recommend tylenol, rest, hydration  Reviewed caffeine  Also reviewed Magnesium and Riboflavin  Reviewed genetic testing options with patient including sequential screen vs cell free DNA  Patient is unsure about genetic testing at this time  Will review further with  center at appointment 3/31/2023  Reviewed if decides on NIPT would recommend AFP at 15-18 weeks to evaluate for ONTD  Return to office for ob check in 4 weeks  Orders:  -     POCT urine dip    3  History of cervical LEEP biopsy affecting care of mother, antepartum, first trimester    4  Genital herpes simplex virus (HSV) infection in mother affecting pregnancy  Assessment & Plan:  Recommend suppression therapy at 36 weeks  Next OB Visit 4 weeks  Subjective:     CC: Prenatal care    Corona Arteaga is a 32 y o  [place] Bonnet female who presents for routine prenatal care at 13w2d  Pregnancy ROS: constipation and headaches  Hx of migraines prior to pregnancy  Started with current headache last night  Took tylenol which didn't help  No FM, N/V, cramping, VB, LOF, edema, domestic violence, or smoking  Tolerating PNV          The following portions of the patient's history were reviewed and updated as appropriate: allergies, current medications, past family history, past medical history, obstetric history, gynecologic history, past social history, past surgical history and problem list       Objective:  /70 (BP Location: "Left arm, Patient Position: Sitting, Cuff Size: Standard)   Ht 5' 3\" (1 6 m)   Wt 74 8 kg (165 lb)   LMP 2022 (Exact Date)   BMI 29 23 kg/m²   Pregravid Weight/BMI: 73 9 kg (163 lb) (BMI 28 88)  Current Weight: 74 8 kg (165 lb)   Total Weight Gain: 0 907 kg (2 lb)   Pre- Vitals    Flowsheet Row Most Recent Value   Prenatal Assessment    Fetal Heart Rate 156   Fundal Height (cm) 13 cm   Movement Absent   Prenatal Vitals    Blood Pressure 114/70   Weight - Scale 74 8 kg (165 lb)   Urine Albumin/Glucose    Dilation/Effacement/Station    Vaginal Drainage    Draining Fluid No   Edema    LLE Edema None   RLE Edema None   Facial Edema None           General: Well appearing, no distress  Abdomen: Soft, gravid, nontender      Fundal Height: Appropriate for gestational age  Extremities: Warm and well perfused  Non tender      "

## 2023-03-31 ENCOUNTER — CONSULT (OUTPATIENT)
Age: 28
End: 2023-03-31

## 2023-03-31 ENCOUNTER — OFFICE VISIT (OUTPATIENT)
Dept: ENDOCRINOLOGY | Facility: HOSPITAL | Age: 28
End: 2023-03-31

## 2023-03-31 ENCOUNTER — ROUTINE PRENATAL (OUTPATIENT)
Dept: PERINATAL CARE | Facility: OTHER | Age: 28
End: 2023-03-31

## 2023-03-31 VITALS
BODY MASS INDEX: 29.06 KG/M2 | DIASTOLIC BLOOD PRESSURE: 62 MMHG | HEART RATE: 104 BPM | SYSTOLIC BLOOD PRESSURE: 106 MMHG | HEIGHT: 63 IN | WEIGHT: 164 LBS

## 2023-03-31 VITALS
HEART RATE: 88 BPM | BODY MASS INDEX: 29.06 KG/M2 | RESPIRATION RATE: 14 BRPM | TEMPERATURE: 98 F | WEIGHT: 164 LBS | OXYGEN SATURATION: 98 % | DIASTOLIC BLOOD PRESSURE: 80 MMHG | SYSTOLIC BLOOD PRESSURE: 130 MMHG | HEIGHT: 63 IN

## 2023-03-31 VITALS
DIASTOLIC BLOOD PRESSURE: 70 MMHG | HEIGHT: 63 IN | WEIGHT: 164 LBS | BODY MASS INDEX: 29.06 KG/M2 | SYSTOLIC BLOOD PRESSURE: 120 MMHG | HEART RATE: 100 BPM

## 2023-03-31 DIAGNOSIS — E07.9 THYROID DISEASE DURING PREGNANCY IN FIRST TRIMESTER: ICD-10-CM

## 2023-03-31 DIAGNOSIS — Z36.82 ENCOUNTER FOR ANTENATAL SCREENING FOR NUCHAL TRANSLUCENCY: Primary | ICD-10-CM

## 2023-03-31 DIAGNOSIS — D68.51 FACTOR V LEIDEN (HCC): Primary | ICD-10-CM

## 2023-03-31 DIAGNOSIS — E28.2 POLYCYSTIC OVARIAN SYNDROME: ICD-10-CM

## 2023-03-31 DIAGNOSIS — E03.9 ACQUIRED HYPOTHYROIDISM: Primary | ICD-10-CM

## 2023-03-31 DIAGNOSIS — D68.51 FACTOR V LEIDEN (HCC): ICD-10-CM

## 2023-03-31 DIAGNOSIS — O99.281 THYROID DISEASE DURING PREGNANCY IN FIRST TRIMESTER: ICD-10-CM

## 2023-03-31 DIAGNOSIS — Z34.91 PRENATAL CARE IN FIRST TRIMESTER: ICD-10-CM

## 2023-03-31 DIAGNOSIS — E04.2 MULTINODULAR THYROID: ICD-10-CM

## 2023-03-31 DIAGNOSIS — Z3A.13 13 WEEKS GESTATION OF PREGNANCY: ICD-10-CM

## 2023-03-31 RX ORDER — LEVOTHYROXINE SODIUM 112 UG/1
112 TABLET ORAL DAILY
Qty: 60 TABLET | Refills: 1 | Status: SHIPPED | OUTPATIENT
Start: 2023-03-31

## 2023-03-31 NOTE — LETTER
"Date: 3/31/2023    [address]  [address]  [facility] [id number]    Patient: Radha Kidd   YOB: 1995   Date of Visit: 3/31/2023   Gestational age 12w10d   Sarai Higginbotham of this communication: Routine       Dear Junie Baldwin,    This patient was seen recently in our  office  Please see ultrasound report under \"OB Procedures\" tab  Please don't hesitate to contact our office with any concerns or questions        Sincerely,      Allan Desir MD  Attending Physician, [facility]         "

## 2023-03-31 NOTE — PROGRESS NOTES
"Patient chose to have [facility] Non-invasive Prenatal Screen without fetal sex  Patient given brochure and is aware [facility] will contact their insurance and coordinate coverage  Patient made aware she will need to respond to text message or e-mail from [employer] within 2 business days or testing will be run through insurance  Patient informed text message will come from area code  \"415\"  Provided [employer] # [phone] and web site : [email or web address]  \"[employer] your test online\" card with barcode and test tube ID provided to patient  Reviewed [facility]'s web site states 5-7 business days for results via their portal    [email or web address] message will be sent to patient when MFM receives results /provider reviews  2 vials of blood drawn from Right arm by Loy Aguilera  Patient tolerated blood draw without difficulty  Specimens labeled with patient identifiers (name, date of birth, specimen collection date), order and specimen were verified with patient, packed and sent via [email or web address]  Copy of lab order scanned to Epic media  Maternal Fetal Medicine will have results in approximately 7-10 business days and will call patient or notify via [employer]  Patient aware viewing lab result online will reveal fetal sex if ordered  Patient verbalized understanding of all instructions and no questions at this time    "

## 2023-03-31 NOTE — PROGRESS NOTES
Established Patient Progress Note       Chief Complaint   Patient presents with   • Hypothyroidism      History of Present Illness:     Eveline Webb is a 32 y o  female with a history of subclinical hypothyroidism on levothyroxine, multinodular thyroid s/p FNA of 0 9cm right thyroid nodule in 08/2019 with neg results, PCOS since age 19 who is currently 14 weeks pregnant       Subclinical hypothyroidism:- Dx with thyroid issue in 2017, Started on 25mcg levothyroxine  Last visit, as patient was pregnant we increased her levothyroxine to 75mcg at weight based dose of 1mcg/kg given subclinical hypothyroidism  Patient currently reports feeling tired- all the times but attributes this to pregnancy, has baseline hair loss and dry skin which again says are not better or worse  Most recent TFT 03/23 TSH 5 67  Patient does take her levothyroxine 75mcg in AM and prenatals at night       Dx of Non toxic Multinodular goiter:- Diagnosed based on exam-US done in 2018/2019, had FNA done twice as well but reports in file only for 1 right sided nodule 0 9cm in side- neg for malignancy in 2019  Repeat US for follow up done last week but results for reported yet  Currently denies any dysphagia, odynophagia, no radiation to neck, no family hx of thyroid cancer  Dx PCOS- age 19  Reports always had irregular menses which was well controlled with OCP, taken off this since was dx with factor 5 leiden def, started on IUD instead, patient also reports having issues with hirsutism with need to pluck hair from face every day  This is her first pregnancy- naturally conceived  Currently on metformin for PCOS management, never been on aldactone in past  Recently had a 1hr OGTT and was normal  Following with Ob for BG management during pregnancy  Given Pregnancy- doesn't need much here until post partum      Social Hx:- Customer service for [employer]   Does not drink alcohol, no smoking anymore, no other drugs   Fhx:   thyroid disorder- Mother had thyroid issues  Possible PCOS in mother  Patient Active Problem List   Diagnosis   • Anxiety   • Recurrent major depressive disorder, in partial remission ([facility] )   • Acquired hypothyroidism   • Polycystic ovarian syndrome   • Factor V Leiden ([facility] )   • Herpes simplex infection of genitourinary system   • Human papilloma virus   • History of cervical LEEP biopsy affecting care of mother, antepartum, first trimester   • Thyroid disease during pregnancy in first trimester   • 13 weeks gestation of pregnancy   • Genital herpes simplex virus (HSV) infection in mother affecting pregnancy      Past Medical History:   Diagnosis Date   • Anxiety and depression     previously on Cymbalta, stopped before fall 2022 due to cost of visits  Currently no medication and self-manage  • Factor 5 Leiden mutation, heterozygous [facility])    • Genital herpes     Last out break per pt months ago (02/27/23)  On Valtrex only w/outbreak  • History of loop electrical excision procedure (LEEP) 05/2019   • Human papilloma virus    • Hypothyroid     Currently on Levothyroxine 25mg and in the process of finding a new endocrinologist  Does not remember when last TSH check  • Miscarriage 9/16/22    This was my first pregnancy  The miscarriage started Friday, 9/16/22   • PCOS (polycystic ovarian syndrome)     Currently on metformin 500 mg x2 daily        Past Surgical History:   Procedure Laterality Date   • CERVICAL BIOPSY  W/ LOOP ELECTRODE EXCISION     • WISDOM TOOTH EXTRACTION        Family History   Problem Relation Age of Onset   • Thyroid disease Mother    • Hypothyroidism Mother    • Depression Father    • Cancer Father         Multiple Myloma   • Anxiety disorder Father    • Deep vein thrombosis Father      Social History     Tobacco Use   • Smoking status: Former     Packs/day: 0 25     Years: 2 00     Pack years: 0 50     Types: Cigarettes     Passive exposure: Never   • Smokeless tobacco: Never   • Tobacco "comments:     As soon as I found out that I was pregnant, I completely stopped smoking  Substance Use Topics   • Alcohol use: Not Currently     Comment: I rarely drink     No Known Allergies    Current Outpatient Medications:   •  levothyroxine (Euthyrox) 112 mcg tablet, Take 1 tablet (112 mcg total) by mouth daily, Disp: 60 tablet, Rfl: 1  •  Magnesium 400 MG CAPS, Take 400 mg by mouth daily at bedtime, Disp: , Rfl:   •  metFORMIN (GLUCOPHAGE) 500 mg tablet, Take 500 mg by mouth in the morning and 500 mg in the evening  Take with meals  , Disp: , Rfl:   •  Prenatal Vit-Fe Fumarate-FA (PRENATAL VITAMINS PO), Take by mouth, Disp: , Rfl:   •  valACYclovir (VALTREX) 500 mg tablet, if needed Takes when needed, Disp: , Rfl:     Review of Systems   Constitutional: Positive for fatigue  Negative for unexpected weight change  HENT: Negative for trouble swallowing and voice change  Eyes: Negative for photophobia and visual disturbance  Gastrointestinal: Negative for abdominal pain, constipation, diarrhea and nausea  Endocrine: Negative for cold intolerance and heat intolerance  Genitourinary: Negative  Musculoskeletal: Negative  Psychiatric/Behavioral: Negative  Physical Exam:  Body mass index is 29 05 kg/m²  /70   Pulse 100   Ht 5' 3\" (1 6 m)   Wt 74 4 kg (164 lb)   LMP 12/26/2022 (Exact Date)   BMI 29 05 kg/m²    Wt Readings from Last 3 Encounters:   03/31/23 74 4 kg (164 lb)   03/31/23 74 4 kg (164 lb)   03/29/23 74 8 kg (165 lb)       Physical Exam  Constitutional:       Appearance: Normal appearance  She is obese  Cardiovascular:      Rate and Rhythm: Normal rate and regular rhythm  Pulses: Normal pulses  Pulmonary:      Effort: Pulmonary effort is normal    Abdominal:      General: Abdomen is flat  Bowel sounds are normal       Palpations: Abdomen is soft  Skin:     General: Skin is warm and dry  Capillary Refill: Capillary refill takes less than 2 seconds   " Neurological:      General: No focal deficit present  Mental Status: She is alert and oriented to person, place, and time  Psychiatric:         Mood and Affect: Mood normal          Labs:   Units 3/28/23  7:07 AM 3/8/23  8:16 AM 22  7:56 AM    TSH 0 450 - 4 500 uIU/mL 5 670 High   5 920 High   5 340 High       Ref Range & Units 3/8/23  8:16 AM 22  7:56 AM   T4,Free (Direct) 0 82 - 1 77 ng/dL 1 15  1         Radiology-   2018     Addendum by Divya Araujo MD on 2019 5:16 PM EDT  THE PREVIOUS REPORT AND SUBSEQUENT ADDENDUM REPORT DICTATED BY DR Aye Lacy    ADDENDUM: Cytology results as follows: Benign follicular nodule [[place]   category 2]          Imaging Results - US THYROID NEEDLE ASP (2019 1:34 PM EDT)  Impressions   2019 2:14 PM EDT    Successful ultrasound guided fine needle biopsy of nodule/s in the thyroid gland  The patient is planning on calling their physician in several days to learn the pathology results         Imaging Results - US THYROID NEEDLE ASP (2019 1:34 PM EDT)  Narrative   2019 2:14 PM EDT    KAREN Beth UI: [id number] : 1995 F    [id number]    ULTRASOUND GUIDED THYROID BIOPSY    CLINICAL INDICATION: Abnormal thyroid  COMMENT: The patient's recently identified nodule in the right lobe of the thyroid gland was localized  Nodule measures 0 9 x 0 9 x 0 7 cm  Following informed consent, cleansing and topical anesthesia of the skin, 5 separate 25-gauge fine needle  aspirates were performed under ultrasound guidance  All samples were given to pathology for cytology and were placed in tubes for Afirma study as needed  Material obtained was reviewed immediately by pathology and it was felt adequate samples had been obtained  The patient tolerated the procedure without complication and left the department in good condition with instructions as to how to proceed should  complications develop     01/2019  echnique: A sonogram of the thyroid gland was performed assessing   gray-scale appearance and color doppler flow  DISCUSSION: Comparison in May with prior thyroid ultrasound dated   4/13/2018  The right thyroid lobe measures 4 7 x 1 7 x 1 3 cm  The left thyroid   lobe measures 4 1 x 1 4 x 1 1 cm  Isthmus measures 4mm in maximum AP   diameter  There is a hypoechoic irregular marginated 1 2 cm mass in   the right superior thyroid previously measuring 1 cm  Given the   irregular margins as well as interval increase in size, this is mildly   suspicious in nature and biopsy is recommended  There is also a stable   1 3 cm hypoechoic well marginated mass in the left superior thyroid        No adjacent enlarged lymph nodes are seen  IMPRESSION:   1  Mildly suspicious 1 2 cm irregularly marginated hypoechoic nodule   in the right superior thyroid for which ultrasound guided FNA is   recommended  2  Stable 1 3 cm well marginated left superior thyroid nodule  U CONSENSUS PANEL RECOMMENDATIONS FOR THE BIOPSY OF THYROID NODULES:     1  Solid of 1cm or larger that have microcalcifications   2  Solid or nodules with coarse calcifications that are 1 5 cm or   larger   3  Mixed solid and cystic or almost entirely cystic with solid mural   component of 2cm or larger   4  Growing nodules   5   Suspicious adenopathy overrides these nodule features and should   prompt FNA of either the lymph node or the thyroid nodule       Impression & Plan:    Problem List Items Addressed This Visit        Endocrine    Acquired hypothyroidism - Primary    Relevant Medications    levothyroxine (Euthyrox) 112 mcg tablet    Other Relevant Orders    TSH, 3rd generation with Free T4 reflex    T4, free Lab Collect       Orders Placed This Encounter   Procedures   • TSH, 3rd generation with Free T4 reflex     Standing Status:   Future     Number of Occurrences:   1     Standing Expiration Date:   3/31/2024   • T4, free Lab Collect     Standing Status:   Future     Number of Occurrences:   1     Standing Expiration Date:   3/31/2024       There are no Patient Instructions on file for this visit  1  Subclinical Hypothyroidism, likely due to [address]:- Patient currently 14 weeks pregnant, TSH goal for optimization for pregnancy is <3 0 in 2/3rd trimester  Currently experiencing fatigue, TSH still elevated above goal at 5 6  therefore will increase levothyroxine to 112mcg daily for now and repeat labs in 1 month        2  Multinodular thyroid- Non toxic, s/p FNA in 2019 of right thyroid nodule- neg FNA  Patient low risk for thyroid cancer  No compressive symptoms  Repeat US done but not reported  Once have results will reach out  If FNA needed given currently in secondary trimester, can persue     3) PCOS:- Dx in 2014, given irregular menses and hirsutism- most likely, but unsure if PCOS mimickers ruled out, Will check 17-hydroxy later after pregnancy to rule out  Discussed PCOS management per symptomatic management, given she is currently pregnant- does not need OCP to manage menses or need aldactone for hirsutism given teratogenic  Will obtain PCOS mimickers workup, Lipid and HbA1C after pregnancy      RTC in 2 months for hypothyroidism    Discussed with the patient and all questioned fully answered  She will call me if any problems arise      Counseled patient on diagnostic results, prognosis, risk and benefit of treatment options, instruction for management, importance of treatment compliance, Risk  factor reduction and impressions      Jill Chambers MD

## 2023-04-01 NOTE — PROGRESS NOTES
"[id number] [facility]: Ms Niki Ny was seen today for nuchal translucency ultrasound  See ultrasound report under \"OB Procedures\" tab  Review of Systems   Constitutional: Negative for chills, fever and unexpected weight change  HENT: Negative for congestion, dental problem, facial swelling and sore throat  Eyes: Negative for visual disturbance  Respiratory: Negative for cough and shortness of breath  Cardiovascular: Negative for chest pain and palpitations  Gastrointestinal: Negative for diarrhea and vomiting  Endocrine: Negative for polydipsia  Genitourinary: Negative for dysuria and vaginal bleeding  Musculoskeletal: Negative for back pain and joint swelling  Skin: Negative for rash and wound  Allergic/Immunologic: Negative for immunocompromised state  Neurological: Negative for seizures and headaches  Hematological: Does not bruise/bleed easily  Psychiatric/Behavioral: Negative for hallucinations and suicidal ideas  Physical Exam  Constitutional:       General: She is not in acute distress  Appearance: Normal appearance  She is not ill-appearing, toxic-appearing or diaphoretic  HENT:      Head: Normocephalic and atraumatic  Nose: No congestion or rhinorrhea  Eyes:      General: No scleral icterus  Right eye: No discharge  Left eye: No discharge  Extraocular Movements: Extraocular movements intact  Conjunctiva/sclera: Conjunctivae normal    Pulmonary:      Effort: Pulmonary effort is normal  No respiratory distress  Musculoskeletal:      Cervical back: Normal range of motion  Skin:     Coloration: Skin is not jaundiced or pale  Findings: No erythema, lesion or rash  Neurological:      General: No focal deficit present  Mental Status: She is alert and oriented to person, place, and time     Psychiatric:         Mood and Affect: Mood normal          Behavior: Behavior normal          Please don't " hesitate to contact our office with any concerns or questions    Jessi Tan MD

## 2023-04-05 ENCOUNTER — TELEPHONE (OUTPATIENT)
Dept: ENDOCRINOLOGY | Facility: HOSPITAL | Age: 28
End: 2023-04-05

## 2023-04-05 DIAGNOSIS — E04.2 MULTINODULAR THYROID: Primary | ICD-10-CM

## 2023-04-05 NOTE — TELEPHONE ENCOUNTER
Patient called   She would like you to order the FNA for her to have done at [address] [place] [address]

## 2023-04-25 ENCOUNTER — ROUTINE PRENATAL (OUTPATIENT)
Dept: OBGYN CLINIC | Facility: CLINIC | Age: 28
End: 2023-04-25

## 2023-04-25 VITALS — WEIGHT: 166.6 LBS | BODY MASS INDEX: 29.51 KG/M2 | DIASTOLIC BLOOD PRESSURE: 56 MMHG | SYSTOLIC BLOOD PRESSURE: 106 MMHG

## 2023-04-25 DIAGNOSIS — D68.51 FACTOR V LEIDEN (HCC): ICD-10-CM

## 2023-04-25 DIAGNOSIS — F33.41 RECURRENT MAJOR DEPRESSIVE DISORDER, IN PARTIAL REMISSION (HCC): ICD-10-CM

## 2023-04-25 DIAGNOSIS — Z3A.17 17 WEEKS GESTATION OF PREGNANCY: Primary | ICD-10-CM

## 2023-04-25 DIAGNOSIS — E07.9 THYROID DISEASE DURING PREGNANCY IN SECOND TRIMESTER: ICD-10-CM

## 2023-04-25 DIAGNOSIS — Z36.9 ANTENATAL SCREENING ENCOUNTER: ICD-10-CM

## 2023-04-25 DIAGNOSIS — O99.282 THYROID DISEASE DURING PREGNANCY IN SECOND TRIMESTER: ICD-10-CM

## 2023-04-25 DIAGNOSIS — N76.0 ACUTE VAGINITIS: ICD-10-CM

## 2023-04-25 DIAGNOSIS — F41.9 ANXIETY: ICD-10-CM

## 2023-04-25 LAB
BV WHIFF TEST VAG QL: ABNORMAL
CLUE CELLS SPEC QL WET PREP: ABNORMAL
PH SMN: 4 [PH]
SL AMB  POCT GLUCOSE, UA: NEGATIVE
SL AMB POCT URINE PROTEIN: NEGATIVE
SL AMB POCT WET MOUNT: ABNORMAL
T VAGINALIS VAG QL WET PREP: ABNORMAL
YEAST VAG QL WET PREP: ABNORMAL

## 2023-04-25 NOTE — PROGRESS NOTES
Routine Prenatal Visit  [address] [facility], [address], [facility], [address] [facility]    Assessment/Plan:  Marisol Amaro is a 32y o  year old  at 17w1d who presents for routine prenatal visit  1  17 weeks gestation of pregnancy  -     POCT urine dip  -     Alpha fetoprotein, maternal; Future  -     Alpha fetoprotein, maternal    2  Acute vaginitis  Comments:  + hyphae    whiff neg   ph 4   Terazol 7 one applicator at bedtime for 5 -7 nights  Open to air   Orders:  -     terconazole (TERAZOL 7) 0 4 % vaginal cream; Insert 1 applicator into the vagina daily at bedtime for 7 days    3   screening encounter  -     Alpha fetoprotein, maternal; Future  -     Alpha fetoprotein, maternal    4  Thyroid disease during pregnancy in second trimester    5  Recurrent major depressive disorder, in partial remission ([facility] )    6  Anxiety    7  Factor V Leiden ([facility] )      Labs reviewed and  MFM consult   Needs work note for not lifting greater than 20-25 lbs  Desires  Compression socks and belly band for  PP  Instructed to  Have  Orders sent to a physician name but attn nurses  No bleeding  + vaginal itching  And irritation  Wet mount consistent with   Candida  Terazol 7 one applicator at bedtime for 5-7 nights   Open to air and decrease sugar intake  Fu with   MFM at  20 weeks     Subjective:     CC: Prenatal care    Garima Desouza is a 32 y o  Anne Benyander female who presents for routine prenatal care at 17w1d  Pregnancy ROS: no  leakage of fluid, pelvic pain, or vaginal bleeding  No  fetal movement      The following portions of the patient's history were reviewed and updated as appropriate: allergies, current medications, past family history, past medical history, obstetric history, gynecologic history, past social history, past surgical history and problem list       Objective:  /56 (BP Location: Left arm, Patient Position: Sitting, Cuff Size: Large)   Wt 75 6 kg (166 lb 9 6 oz)   LMP 2022 (Exact Date)   BMI 29 51 kg/m²   Pregravid Weight/BMI: 73 9 kg (163 lb) (BMI 28 88)  Current Weight: 75 6 kg (166 lb 9 6 oz)   Total Weight Gain: 1 633 kg (3 lb 9 6 oz)   Pre-Rich Vitals    Flowsheet Row Most Recent Value   Prenatal Assessment    Fetal Heart Rate 150-162   Movement Absent   Prenatal Vitals    Blood Pressure 106/56   Weight - Scale 75 6 kg (166 lb 9 6 oz)   Urine Albumin/Glucose    Dilation/Effacement/Station    Vaginal Drainage    Draining Fluid No   Edema    LLE Edema None   RLE Edema None   Facial Edema None           General: Well appearing, no distress  Respiratory: Unlabored breathing  Cardiovascular: Regular rate  Abdomen: Soft, gravid, nontender  Fundal Height: Appropriate for gestational age  Extremities: Warm and well perfused  Non tender

## 2023-04-26 ENCOUNTER — VBI (OUTPATIENT)
Dept: ADMINISTRATIVE | Facility: OTHER | Age: 28
End: 2023-04-26

## 2023-04-28 ENCOUNTER — TELEPHONE (OUTPATIENT)
Dept: OBGYN CLINIC | Facility: CLINIC | Age: 28
End: 2023-04-28

## 2023-04-28 LAB
T4 FREE SERPL-MCNC: 1.49 NG/DL (ref 0.82–1.77)
TSH SERPL DL<=0.005 MIU/L-ACNC: 2.39 UIU/ML (ref 0.45–4.5)

## 2023-04-28 NOTE — TELEPHONE ENCOUNTER
Rec'd fax from [employer] requesting Compression Stockings and Sacroiliac Support  Called pt and she states she did requested them both as her insurance covers it  Form completed and faxed w/ fax confirmation and sent to scan into patient's chart

## 2023-04-29 LAB
2ND TRIMESTER 4 SCREEN SERPL-IMP: NORMAL
AFP ADJ MOM SERPL: 0.9
AFP INTERP AMN-IMP: NORMAL
AFP INTERP SERPL-IMP: NORMAL
AFP INTERP SERPL-IMP: NORMAL
AFP SERPL-MCNC: 34.8 NG/ML
AGE AT DELIVERY: 30.3 YR
GA METHOD: NORMAL
GA: 17.4 WEEKS
IDDM PATIENT QL: NO
MULTIPLE PREGNANCY: NO
NEURAL TUBE DEFECT RISK FETUS: NORMAL %

## 2023-05-02 ENCOUNTER — HOSPITAL ENCOUNTER (OUTPATIENT)
Dept: ULTRASOUND IMAGING | Facility: HOSPITAL | Age: 28
Discharge: HOME | End: 2023-05-02
Attending: STUDENT IN AN ORGANIZED HEALTH CARE EDUCATION/TRAINING PROGRAM

## 2023-05-02 DIAGNOSIS — E04.2 MULTINODULAR THYROID: ICD-10-CM

## 2023-05-02 RX ORDER — LIDOCAINE HYDROCHLORIDE 10 MG/ML
5 INJECTION, SOLUTION EPIDURAL; INFILTRATION; INTRACAUDAL; PERINEURAL ONCE
Status: DISCONTINUED | OUTPATIENT
Start: 2023-05-02 | End: 2023-05-06 | Stop reason: HOSPADM

## 2023-05-03 LAB — F5 GENE MUT ANL BLD/T: ABNORMAL

## 2023-05-04 ENCOUNTER — TELEPHONE (OUTPATIENT)
Dept: ENDOCRINOLOGY | Facility: HOSPITAL | Age: 28
End: 2023-05-04

## 2023-05-04 DIAGNOSIS — E04.2 MULTINODULAR THYROID: Primary | ICD-10-CM

## 2023-05-04 NOTE — TELEPHONE ENCOUNTER
Patient called  She would like to know if she would have the thyroid US done at the beginning of September since her due date is October 2nd?  If so she will need the order updated with the new date

## 2023-05-10 ENCOUNTER — ROUTINE PRENATAL (OUTPATIENT)
Dept: PERINATAL CARE | Facility: OTHER | Age: 28
End: 2023-05-10

## 2023-05-10 VITALS
DIASTOLIC BLOOD PRESSURE: 68 MMHG | BODY MASS INDEX: 30.44 KG/M2 | WEIGHT: 171.8 LBS | SYSTOLIC BLOOD PRESSURE: 112 MMHG | HEART RATE: 80 BPM | HEIGHT: 63 IN

## 2023-05-10 DIAGNOSIS — Z36.86 ENCOUNTER FOR ANTENATAL SCREENING FOR CERVICAL LENGTH: ICD-10-CM

## 2023-05-10 DIAGNOSIS — Z36.3 ENCOUNTER FOR ANTENATAL SCREENING FOR MALFORMATION USING ULTRASOUND: Primary | ICD-10-CM

## 2023-05-10 DIAGNOSIS — Z3A.19 19 WEEKS GESTATION OF PREGNANCY: ICD-10-CM

## 2023-05-10 NOTE — PROGRESS NOTES
Ultrasound Probe Disinfection    A transvaginal ultrasound was performed  Prior to use, disinfection was performed with High Level Disinfection Process (Trophon)  Probe serial number U2: [id number] was used        Gita Crocker  05/10/23  10:04 AM

## 2023-05-23 ENCOUNTER — ROUTINE PRENATAL (OUTPATIENT)
Dept: OBGYN CLINIC | Facility: CLINIC | Age: 28
End: 2023-05-23

## 2023-05-23 VITALS — SYSTOLIC BLOOD PRESSURE: 98 MMHG | DIASTOLIC BLOOD PRESSURE: 60 MMHG | WEIGHT: 175 LBS | BODY MASS INDEX: 31 KG/M2

## 2023-05-23 DIAGNOSIS — Z3A.21 21 WEEKS GESTATION OF PREGNANCY: Primary | ICD-10-CM

## 2023-05-23 DIAGNOSIS — E07.9 THYROID DISEASE DURING PREGNANCY IN SECOND TRIMESTER: ICD-10-CM

## 2023-05-23 DIAGNOSIS — O99.282 THYROID DISEASE DURING PREGNANCY IN SECOND TRIMESTER: ICD-10-CM

## 2023-05-23 DIAGNOSIS — O34.41 HISTORY OF CERVICAL LEEP BIOPSY AFFECTING CARE OF MOTHER, ANTEPARTUM, FIRST TRIMESTER: ICD-10-CM

## 2023-05-23 DIAGNOSIS — O98.319 GENITAL HERPES SIMPLEX VIRUS (HSV) INFECTION IN MOTHER AFFECTING PREGNANCY: ICD-10-CM

## 2023-05-23 DIAGNOSIS — D68.51 FACTOR V LEIDEN (HCC): ICD-10-CM

## 2023-05-23 DIAGNOSIS — A60.09 GENITAL HERPES SIMPLEX VIRUS (HSV) INFECTION IN MOTHER AFFECTING PREGNANCY: ICD-10-CM

## 2023-05-23 DIAGNOSIS — F33.41 RECURRENT MAJOR DEPRESSIVE DISORDER, IN PARTIAL REMISSION (HCC): ICD-10-CM

## 2023-05-23 DIAGNOSIS — F41.9 ANXIETY: ICD-10-CM

## 2023-05-23 LAB
SL AMB  POCT GLUCOSE, UA: NEGATIVE
SL AMB POCT URINE PROTEIN: NEGATIVE

## 2023-05-23 NOTE — PROGRESS NOTES
Routine Prenatal Visit  [address] [facility], [address], [facility], [address] [facility]    Assessment/Plan:  Kareem Lee is a 25y o  year old  at 21w1d who presents for routine prenatal visit  1  21 weeks gestation of pregnancy  -     POCT urine dip    2  Factor V Leiden ([facility] )    3  Genital herpes simplex virus (HSV) infection in mother affecting pregnancy    4  History of cervical LEEP biopsy affecting care of mother, antepartum, first trimester    5  Anxiety    6  Recurrent major depressive disorder, in partial remission ([facility] )    7  Thyroid disease during pregnancy in second trimester          Subjective:     CC: Prenatal care    Constance Marinelli is a 25 y o   female who presents for routine prenatal care at 21w1d  Pregnancy ROS: no  leakage of fluid, pelvic pain, or vaginal bleeding   + fetal movement  The following portions of the patient's history were reviewed and updated as appropriate: allergies, current medications, past family history, past medical history, obstetric history, gynecologic history, past social history, past surgical history and problem list       Objective:  BP 98/60   Wt 79 4 kg (175 lb)   LMP 2022 (Exact Date)   BMI 31 00 kg/m²   Pregravid Weight/BMI: 73 9 kg (163 lb) (BMI 28 88)  Current Weight: 79 4 kg (175 lb)   Total Weight Gain: 5 443 kg (12 lb)   Pre- Vitals    Flowsheet Row Most Recent Value   Prenatal Assessment    Fetal Heart Rate 142-150   Fundal Height (cm) 21 cm   Movement Present   Prenatal Vitals    Blood Pressure 98/60   Weight - Scale 79 4 kg (175 lb)   Urine Albumin/Glucose    Dilation/Effacement/Station    Vaginal Drainage    Draining Fluid No   Edema    LLE Edema None   RLE Edema None   Facial Edema None           General: Well appearing, no distress  Respiratory: Unlabored breathing  Cardiovascular: Regular rate  Abdomen: Soft, gravid, nontender  Fundal Height: Appropriate for gestational age    Extremities: Warm and well perfused  Non tender

## 2023-06-07 ENCOUNTER — OFFICE VISIT (OUTPATIENT)
Dept: ENDOCRINOLOGY | Facility: HOSPITAL | Age: 28
End: 2023-06-07
Payer: COMMERCIAL

## 2023-06-07 VITALS
HEIGHT: 63 IN | SYSTOLIC BLOOD PRESSURE: 120 MMHG | BODY MASS INDEX: 31.5 KG/M2 | WEIGHT: 177.8 LBS | HEART RATE: 97 BPM | DIASTOLIC BLOOD PRESSURE: 80 MMHG

## 2023-06-07 DIAGNOSIS — E07.9 THYROID DISEASE DURING PREGNANCY IN FIRST TRIMESTER: ICD-10-CM

## 2023-06-07 DIAGNOSIS — E07.9 THYROID DISEASE DURING PREGNANCY IN SECOND TRIMESTER: Primary | ICD-10-CM

## 2023-06-07 DIAGNOSIS — E04.2 MULTINODULAR THYROID: ICD-10-CM

## 2023-06-07 DIAGNOSIS — O99.282 THYROID DISEASE DURING PREGNANCY IN SECOND TRIMESTER: Primary | ICD-10-CM

## 2023-06-07 DIAGNOSIS — O99.281 THYROID DISEASE DURING PREGNANCY IN FIRST TRIMESTER: ICD-10-CM

## 2023-06-07 PROCEDURE — 99214 OFFICE O/P EST MOD 30 MIN: CPT | Performed by: STUDENT IN AN ORGANIZED HEALTH CARE EDUCATION/TRAINING PROGRAM

## 2023-06-21 ENCOUNTER — ROUTINE PRENATAL (OUTPATIENT)
Dept: OBGYN CLINIC | Facility: CLINIC | Age: 28
End: 2023-06-21
Payer: COMMERCIAL

## 2023-06-21 ENCOUNTER — PATIENT OUTREACH (OUTPATIENT)
Dept: OBGYN CLINIC | Facility: CLINIC | Age: 28
End: 2023-06-21

## 2023-06-21 VITALS
BODY MASS INDEX: 31.89 KG/M2 | DIASTOLIC BLOOD PRESSURE: 72 MMHG | HEIGHT: 63 IN | WEIGHT: 180 LBS | SYSTOLIC BLOOD PRESSURE: 112 MMHG

## 2023-06-21 DIAGNOSIS — O34.42 HISTORY OF CERVICAL LEEP BIOPSY AFFECTING CARE OF MOTHER, ANTEPARTUM, SECOND TRIMESTER: ICD-10-CM

## 2023-06-21 DIAGNOSIS — Z3A.25 25 WEEKS GESTATION OF PREGNANCY: ICD-10-CM

## 2023-06-21 DIAGNOSIS — Z59.89 INSURANCE COVERAGE PROBLEMS: Primary | ICD-10-CM

## 2023-06-21 DIAGNOSIS — E07.9 THYROID DISEASE DURING PREGNANCY IN SECOND TRIMESTER: Primary | ICD-10-CM

## 2023-06-21 DIAGNOSIS — Z36.89 ENCOUNTER FOR OTHER SPECIFIED ANTENATAL SCREENING: ICD-10-CM

## 2023-06-21 DIAGNOSIS — E03.9 ACQUIRED HYPOTHYROIDISM: Primary | ICD-10-CM

## 2023-06-21 DIAGNOSIS — O99.282 THYROID DISEASE DURING PREGNANCY IN SECOND TRIMESTER: Primary | ICD-10-CM

## 2023-06-21 LAB
SL AMB  POCT GLUCOSE, UA: NORMAL
SL AMB POCT URINE PROTEIN: NORMAL

## 2023-06-21 PROCEDURE — 81002 URINALYSIS NONAUTO W/O SCOPE: CPT | Performed by: OBSTETRICS & GYNECOLOGY

## 2023-06-21 PROCEDURE — PNV: Performed by: OBSTETRICS & GYNECOLOGY

## 2023-06-21 SDOH — ECONOMIC STABILITY - INCOME SECURITY: OTHER PROBLEMS RELATED TO HOUSING AND ECONOMIC CIRCUMSTANCES: Z59.89
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
## 2023-06-28 PROBLEM — O99.282 THYROID DISEASE DURING PREGNANCY IN SECOND TRIMESTER: Status: ACTIVE | Noted: 2023-03-29

## 2023-06-28 PROBLEM — O34.42 HISTORY OF CERVICAL LEEP BIOPSY AFFECTING CARE OF MOTHER, ANTEPARTUM, SECOND TRIMESTER: Status: ACTIVE | Noted: 2023-02-27

## 2023-07-05 ENCOUNTER — PATIENT OUTREACH (OUTPATIENT)
Dept: OBGYN CLINIC | Facility: CLINIC | Age: 28
End: 2023-07-05

## 2023-07-05 ENCOUNTER — TELEPHONE (OUTPATIENT)
Dept: OBGYN CLINIC | Facility: CLINIC | Age: 28
End: 2023-07-05

## 2023-07-05 NOTE — PROGRESS NOTES
SW called patient to check in and discuss needed documents to apply for Medicaid next month. Patient did not answer; SW left  requesting a return call. Addendum    Patient returned TRERA's call - reports she is doing well however things are very busy at work. 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 Patient's last day remains August 1st and benefits terminating August 31st. Patient and SW agreed to meet at 4:00PM on 08/02/23 at [facility] prior to her 5:40PM appointment. SW and patient will fill out her MA application on the [employer] web site at this time. SW sent necessary documents to patient via email. Patient only applying for MA for herself. SW informed patient that If she is also interested in applying for SNAP, she will need documents/income and benefit information for her boyfriend as well if they buy food, prepare meals, and eat together. SW and patient to meet again next month. SW encouraged patient to reach out prior to then if she requires further assistance, support, or resources.

## 2023-07-05 NOTE — TELEPHONE ENCOUNTER
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 This task involves bending and reaching as she goes around the pallet which causes her to be lightheaded and short of breath.

## 2023-07-07 LAB
EXTERNAL HEMOGLOBIN: 11.5 G/DL
EXTERNAL PLATELET COUNT: 307 K/ÂΜL
EXTERNAL SYPHILIS TOTAL IGG/IGM SCREENING: NORMAL

## 2023-07-07 NOTE — TELEPHONE ENCOUNTER
Please provide letter with the following instructions:    Melany Bellamy is under my professional care. She is currently pregnant with an Estimated Date of Delivery of 10/2/23. She may continue to work, but a lifting restriction of 20 lbs is recommended. Repetitive lifting of lighter objects should also be avoided, particularly when involving bending and/or lifting above shoulder height.     Candido Talavera MD  7/7/2023 8:16 AM

## 2023-07-08 LAB
ERYTHROCYTE [DISTWIDTH] IN BLOOD BY AUTOMATED COUNT: 12.4 % (ref 11.7–15.4)
GLUCOSE 1H P 50 G GLC PO SERPL-MCNC: 122 MG/DL (ref 70–139)
HCT VFR BLD AUTO: 34.2 % (ref 34–46.6)
HGB BLD-MCNC: 11.5 G/DL (ref 11.1–15.9)
MCH RBC QN AUTO: 31 PG (ref 26.6–33)
MCHC RBC AUTO-ENTMCNC: 33.6 G/DL (ref 31.5–35.7)
MCV RBC AUTO: 92 FL (ref 79–97)
PLATELET # BLD AUTO: 307 X10E3/UL (ref 150–450)
RBC # BLD AUTO: 3.71 X10E6/UL (ref 3.77–5.28)
RPR SER QL: NON REACTIVE
T4 FREE SERPL-MCNC: 1.3 NG/DL (ref 0.82–1.77)
TSH SERPL DL<=0.005 MIU/L-ACNC: 2.55 UIU/ML (ref 0.45–4.5)
WBC # BLD AUTO: 9.5 X10E3/UL (ref 3.4–10.8)

## 2023-07-12 DIAGNOSIS — E03.9 ACQUIRED HYPOTHYROIDISM: ICD-10-CM

## 2023-07-12 RX ORDER — LEVOTHYROXINE SODIUM 112 UG/1
TABLET ORAL
Qty: 30 TABLET | Refills: 3 | Status: SHIPPED | OUTPATIENT
Start: 2023-07-12

## 2023-07-19 ENCOUNTER — ROUTINE PRENATAL (OUTPATIENT)
Dept: OBGYN CLINIC | Facility: CLINIC | Age: 28
End: 2023-07-19
Payer: COMMERCIAL

## 2023-07-19 VITALS
WEIGHT: 190.4 LBS | SYSTOLIC BLOOD PRESSURE: 112 MMHG | DIASTOLIC BLOOD PRESSURE: 60 MMHG | BODY MASS INDEX: 33.73 KG/M2 | HEIGHT: 63 IN

## 2023-07-19 DIAGNOSIS — O34.42 HISTORY OF CERVICAL LEEP BIOPSY AFFECTING CARE OF MOTHER, ANTEPARTUM, SECOND TRIMESTER: ICD-10-CM

## 2023-07-19 DIAGNOSIS — K21.9 GASTROESOPHAGEAL REFLUX IN PREGNANCY: ICD-10-CM

## 2023-07-19 DIAGNOSIS — O99.619 GASTROESOPHAGEAL REFLUX IN PREGNANCY: ICD-10-CM

## 2023-07-19 DIAGNOSIS — Z23 NEED FOR TDAP VACCINATION: ICD-10-CM

## 2023-07-19 DIAGNOSIS — Z3A.29 29 WEEKS GESTATION OF PREGNANCY: ICD-10-CM

## 2023-07-19 DIAGNOSIS — O98.319 GENITAL HERPES SIMPLEX VIRUS (HSV) INFECTION IN MOTHER AFFECTING PREGNANCY: ICD-10-CM

## 2023-07-19 DIAGNOSIS — O99.282 THYROID DISEASE DURING PREGNANCY IN SECOND TRIMESTER: Primary | ICD-10-CM

## 2023-07-19 DIAGNOSIS — D68.51 FACTOR 5 LEIDEN MUTATION, HETEROZYGOUS (HCC): ICD-10-CM

## 2023-07-19 DIAGNOSIS — A60.09 GENITAL HERPES SIMPLEX VIRUS (HSV) INFECTION IN MOTHER AFFECTING PREGNANCY: ICD-10-CM

## 2023-07-19 DIAGNOSIS — E07.9 THYROID DISEASE DURING PREGNANCY IN SECOND TRIMESTER: Primary | ICD-10-CM

## 2023-07-19 LAB
SL AMB  POCT GLUCOSE, UA: NORMAL
SL AMB POCT URINE PROTEIN: NORMAL

## 2023-07-19 PROCEDURE — PNV: Performed by: STUDENT IN AN ORGANIZED HEALTH CARE EDUCATION/TRAINING PROGRAM

## 2023-07-19 PROCEDURE — 90715 TDAP VACCINE 7 YRS/> IM: CPT | Performed by: STUDENT IN AN ORGANIZED HEALTH CARE EDUCATION/TRAINING PROGRAM

## 2023-07-19 PROCEDURE — 81002 URINALYSIS NONAUTO W/O SCOPE: CPT | Performed by: STUDENT IN AN ORGANIZED HEALTH CARE EDUCATION/TRAINING PROGRAM

## 2023-07-19 PROCEDURE — 90471 IMMUNIZATION ADMIN: CPT | Performed by: STUDENT IN AN ORGANIZED HEALTH CARE EDUCATION/TRAINING PROGRAM

## 2023-07-19 RX ORDER — SUCRALFATE 1 G/1
TABLET ORAL
COMMUNITY
Start: 2023-07-12

## 2023-07-19 NOTE — ASSESSMENT & PLAN NOTE
- Third trimester labs reviewed. - TDaP administered today. - Problem list updated, results console reviewed and updated with pertinent prenatal labs. - Will obtain third trimester growth ultrasound per [facility].   - PMH, PSH, medications reviewed and updated as needed  - Return to office in 2 wk(s) for routine prenatal care

## 2023-07-19 NOTE — PROGRESS NOTES
Routine Prenatal Visit  [address]  [address] [place] [address], [place], [address]    Assessment/Plan:  South Torres is a 25y.o. year old  at 29w2d who presents for routine prenatal visit. 1. Thyroid disease during pregnancy in second trimester  Assessment & Plan:  - TSH in range at time of 28 week labs. Continue management per Endocrinology. 2. Genital herpes simplex virus (HSV) infection in mother affecting pregnancy  Assessment & Plan:  - Discussed initiation of Valtrex suppression at 36 weeks. 3. Factor 5 Leiden mutation, heterozygous [facility])  Assessment & Plan:  - Discussed that Lovenox ppx would be recommended x 6 week if  delivery is required. 4. History of cervical LEEP biopsy affecting care of mother, antepartum, second trimester    5. 29 weeks gestation of pregnancy  Assessment & Plan:  - Third trimester labs reviewed. - TDaP administered today. - Problem list updated, results console reviewed and updated with pertinent prenatal labs. - Will obtain third trimester growth ultrasound per [facility]. - PMH, PSH, medications reviewed and updated as needed  - Return to office in 2 wk(s) for routine prenatal care    Orders:  -     POCT urine dip    6. Need for Tdap vaccination  -     TDAP VACCINE GREATER THAN OR EQUAL TO 8YO IM    7. Gastroesophageal reflux in pregnancy  Assessment & Plan:  - Recommend OTC Pepcid. May continue Tums. Subjective:   America Bridges is a 25 y.o. Luis Briscoe who presents for routine prenatal care at 29w2d. Complaints today: worsening acid reflux  LOF: no; VB: no; Contractions: no; FM: yes    Objective:  /60 (BP Location: Left arm, Patient Position: Sitting, Cuff Size: Standard)   Ht 5' 3" (1.6 m)   Wt 86.4 kg (190 lb 6.4 oz)   LMP 2022 (Exact Date)   BMI 33.73 kg/m²     General: Well appearing, no distress  Respiratory: Unlabored breathing  Cardiovascular: Regular rate.   Abdomen: Soft, gravid, nontender  Extremities: Warm and well perfused. Non tender.     Pregravid Weight/BMI: 73.9 kg (163 lb) (BMI 28.88)  Current Weight: 86.4 kg (190 lb 6.4 oz)   Total Weight Gain: 12.4 kg (27 lb 6.4 oz)     Pre- Vitals    Flowsheet Row Most Recent Value   Prenatal Assessment    Fetal Heart Rate 150   Fundal Height (cm) 29 cm   Movement Present   Prenatal Vitals    Blood Pressure 112/60   Weight - Scale 86.4 kg (190 lb 6.4 oz)   Urine Albumin/Glucose    Dilation/Effacement/Station    Vaginal Drainage    Draining Fluid No   Edema    LLE Edema None   RLE Edema None   Facial Edema None           Glinda Riedel, MD  2023 7:21 PM

## 2023-07-28 ENCOUNTER — PATIENT OUTREACH (OUTPATIENT)
Dept: OBGYN CLINIC | Facility: CLINIC | Age: 28
End: 2023-07-28

## 2023-07-28 NOTE — PROGRESS NOTES
Patient called [employer] this morning to inquire about meeting next week to apply for MA. Patient reports her contract was extended two more weeks without benefits (pay only). Patient was unsure if this would affect her MA eligibility and inquired if appointment should be moved. SW offered to call patient back after looking into this, patient agreeable as she had to check her calendar for the rest of the month to see when she is free to meet with SW at the end of August.    Patient then e-mailed SW back with her availability later in August. After some reasearch, TERRA replied to e-mail stating that patient should be able to apply for Medicaid benefits at the beginning of August even though she will be receiving income for the first two weeks of August. Unfortunately, this coverage will not be retroactive to the beginning of August if her current income is over the limit, however the coverage should be active starting 09/01/23 if she is approved. Additionally, TERRA informed patient that she will also need to file for unemployment. This is a newer rule from the Department of Welfare that was implemented during [place]. TERRA provided information on how to apply from the [facility] website. TERRA informed patient that this can be done prior to submitting the Medicaid application, or it can be done while the Medicaid application is in process. However, the [employer] will request that she complete this process in order to be considered for Medicaid. Unfortunately, if patient qualifies for unemployment and she is over the Medicaid income limit, she will not be approved for Medicaid. TERRA did not hear back from patient, so TERRA called later in the afternoon to inquire if she was still able to meet next week and if she had any further questions.  Patient expressed concerns that her unemployment income will be too high, however per quick calculations done via phone, it appears patient (and baby) will be under the income limit for household size of 2 with a rough estimate of her unemployment pay (~50% of paycheck). Patient reports she already knows how to apply for unemployment as she had done it in the past.    Patient stated she had to go as she was dealing with a difficult situation at work, but confirmed that she will be able to meet with SW on Wednesday 8/2/23 after her 4:00PM appointment to complete the application. Patient stated she will call [employer]RA on Monday if she has further concerns.

## 2023-07-31 ENCOUNTER — PATIENT OUTREACH (OUTPATIENT)
Dept: OBGYN CLINIC | Facility: CLINIC | Age: 28
End: 2023-07-31

## 2023-07-31 NOTE — PROGRESS NOTES
Patient called SW to inquire about delaying MA application. Patient reports she is unable to apply for unemployment until after Agust18th (official termination date), and she expressed concern that applying too early may result in a denial due to her income during the month of August. Patient also confirmed she will be receiving insurance benefits until August 31st. SW offered to call patient back after looking into this further. Patient agreeable. After further research, SW placed return call to patient. Patient and SW discussed meeting later in August, after her termination, to complete MA application. Patient will apply for unemployment at this time as well. New meeting set for August 21st - meeting time to be finalized the week prior to appointment. No other needs identified at this time, SW to call patient in two weeks to confirm meeting time on 8/21.

## 2023-08-01 PROBLEM — O99.283 THYROID DISEASE DURING PREGNANCY IN THIRD TRIMESTER: Status: ACTIVE | Noted: 2023-03-29

## 2023-08-01 NOTE — PATIENT INSTRUCTIONS
Thank you for choosing us for your  care today. If you have any questions about your ultrasound or care, please do not hesitate to contact us or your primary obstetrician. Some general instructions for your pregnancy are:    Exercise: Aim for 22 minutes per day (150 minutes per week) of regular exercise. Walking is great! Nutrition: Choose healthy sources of calcium, iron, and protein. Learn about Preeclampsia: preeclampsia is a common, serious high blood pressure complication in pregnancy. A blood pressure of 069WEGC (systolic or top number) or 72GSXL (diastolic or bottom number) is not normal and needs evaluation by your doctor. Aspirin is sometimes prescribed in early pregnancy to prevent preeclampsia in women with risk factors - ask your obstetrician if you should be on this medication. For more resources, visit:  MapCoverage.fi  If you smoke, try to reduce how many cigarettes you smoke or try to quit completely. Do not vape. Other warning signs to watch out for in pregnancy or postpartum: chest pain, obstructed breathing or shortness of breath, seizures, thoughts of hurting yourself or your baby, bleeding, a painful or swollen leg, fever, or headache (see [facility] POST-BIRTH Warning Signs campaign). If these happen call 911. Itching is also not normal in pregnancy and if you experience this, especially over your hands and feet, potentially worse at night, notify your doctors.

## 2023-08-02 ENCOUNTER — ULTRASOUND (OUTPATIENT)
Dept: PERINATAL CARE | Facility: OTHER | Age: 28
End: 2023-08-02
Payer: COMMERCIAL

## 2023-08-02 ENCOUNTER — ROUTINE PRENATAL (OUTPATIENT)
Dept: OBGYN CLINIC | Facility: CLINIC | Age: 28
End: 2023-08-02
Payer: COMMERCIAL

## 2023-08-02 VITALS
HEIGHT: 63 IN | BODY MASS INDEX: 33.45 KG/M2 | DIASTOLIC BLOOD PRESSURE: 64 MMHG | SYSTOLIC BLOOD PRESSURE: 114 MMHG | WEIGHT: 188.8 LBS | HEART RATE: 98 BPM

## 2023-08-02 VITALS
BODY MASS INDEX: 33.49 KG/M2 | HEIGHT: 63 IN | WEIGHT: 189 LBS | SYSTOLIC BLOOD PRESSURE: 100 MMHG | DIASTOLIC BLOOD PRESSURE: 58 MMHG

## 2023-08-02 DIAGNOSIS — A60.09 GENITAL HERPES SIMPLEX VIRUS (HSV) INFECTION IN MOTHER AFFECTING PREGNANCY: ICD-10-CM

## 2023-08-02 DIAGNOSIS — Z3A.31 31 WEEKS GESTATION OF PREGNANCY: ICD-10-CM

## 2023-08-02 DIAGNOSIS — D68.51 FACTOR 5 LEIDEN MUTATION, HETEROZYGOUS (HCC): Primary | ICD-10-CM

## 2023-08-02 DIAGNOSIS — K21.9 GASTROESOPHAGEAL REFLUX IN PREGNANCY: ICD-10-CM

## 2023-08-02 DIAGNOSIS — O99.283 THYROID DISEASE DURING PREGNANCY IN THIRD TRIMESTER: ICD-10-CM

## 2023-08-02 DIAGNOSIS — E07.9 THYROID DISEASE DURING PREGNANCY IN THIRD TRIMESTER: ICD-10-CM

## 2023-08-02 DIAGNOSIS — Z36.89 ENCOUNTER FOR ULTRASOUND TO ASSESS FETAL GROWTH: ICD-10-CM

## 2023-08-02 DIAGNOSIS — O99.619 GASTROESOPHAGEAL REFLUX IN PREGNANCY: ICD-10-CM

## 2023-08-02 DIAGNOSIS — O34.42 HISTORY OF CERVICAL LEEP BIOPSY AFFECTING CARE OF MOTHER, ANTEPARTUM, SECOND TRIMESTER: ICD-10-CM

## 2023-08-02 DIAGNOSIS — Z36.89 ENCOUNTER FOR OTHER SPECIFIED ANTENATAL SCREENING: ICD-10-CM

## 2023-08-02 DIAGNOSIS — O98.319 GENITAL HERPES SIMPLEX VIRUS (HSV) INFECTION IN MOTHER AFFECTING PREGNANCY: ICD-10-CM

## 2023-08-02 DIAGNOSIS — E28.2 POLYCYSTIC OVARIAN SYNDROME: ICD-10-CM

## 2023-08-02 LAB
SL AMB  POCT GLUCOSE, UA: NEGATIVE
SL AMB POCT URINE PROTEIN: NEGATIVE

## 2023-08-02 PROCEDURE — 99214 OFFICE O/P EST MOD 30 MIN: CPT | Performed by: OBSTETRICS & GYNECOLOGY

## 2023-08-02 PROCEDURE — PNV: Performed by: STUDENT IN AN ORGANIZED HEALTH CARE EDUCATION/TRAINING PROGRAM

## 2023-08-02 PROCEDURE — 81002 URINALYSIS NONAUTO W/O SCOPE: CPT | Performed by: STUDENT IN AN ORGANIZED HEALTH CARE EDUCATION/TRAINING PROGRAM

## 2023-08-02 PROCEDURE — 76816 OB US FOLLOW-UP PER FETUS: CPT | Performed by: OBSTETRICS & GYNECOLOGY

## 2023-08-02 NOTE — PROGRESS NOTES
Alesia Lynn has no complaints today. She reports regular fetal movements and does not report any problems. She is here today at 31w2d for an ultrasound for fetal growth    Problem list:  1. Factor V Leiden heterozygous  2. Maternal PCOS on metformin 500 mg twice daily. She had normal Glucola screening on metformin. 3. Hx of leep  4. Total hypothyroidism on levothyroxine with a normal TSH of 2.55 on 7/7    Ultrasound findings: The ultrasound today shows normal interval fetal growth and fluid. Pregnancy ultrasound has limitations and is unable to detect all forms of fetal congenital abnormalities. The inaccuracy in the EFW can be off by 1 lb either way in the third trimester. Follow up recommended:   I will review her need for being on metformin during pregnancy with both her OB and her endocrinologist.  If she does not need to be on metformin then recommend she stop and recommend she repeat her Glucola screen from she does not have GDM. She reports that her father had multiple thrombosis and has the diagnosis of factor V Leiden and multiple myeloma. It is impossible to know how much her father's thromboses have to do with his factor V Leiden mutation versus his history of myeloma's. With a first generation family history of thrombosis the recommendation is usually prophylactic Lovenox 40 mg subcu daily for 6 weeks postpartum. Aliza Cruz would feel much better being on Lovenox for 6 weeks postpartum because she is exceedingly concerned about having a life-threatening thrombosis after delivery. She is seeing Dr Martell Jin for an ob visit later today. Pre visit time reviewing her records   10 minutes  Face to face time 8 minutes  Post visit time on documentation of note, updating her problem list, adding orders and prescriptions 15 minutes. Procedures that were completed today were charged separately. The level of decision making was moderate complexity.     Darleen Layne MD

## 2023-08-02 NOTE — ASSESSMENT & PLAN NOTE
- Lovenox would be recommended x 6 weeks postpartum if  delivery. Although not required, Lyssa Benitez would prefer Lovenox ppx for 6 weeks postpartum regardless of mode of delivery due to her concern for life-threatening VTE. Will plan to start [address] following delivery.

## 2023-08-02 NOTE — PROGRESS NOTES
Routine Prenatal Visit  [address] [place] [address], [address], [facility], [address] [facility],[address]W    Assessment/Plan:  Alexys Randall is a 25y.o. year old  at 31w2d who presents for routine prenatal visit. 1. Factor 5 Leiden mutation, heterozygous [facility])  Assessment & Plan:  - Lovenox would be recommended x 6 weeks postpartum if  delivery. Although not required, Alexys Randall would prefer Lovenox ppx for 6 weeks postpartum regardless of mode of delivery due to her concern for life-threatening VTE. Will plan to start [address] following delivery. 2. Thyroid disease during pregnancy in third trimester  Assessment & Plan:  - TSH in appropriate range for 3rd trimester. Continue levothyroxine. 3. Genital herpes simplex virus (HSV) infection in mother affecting pregnancy  Assessment & Plan:  - Reviewed suppressive therapy with Valtrex will begin at 36 weeks      4. Gastroesophageal reflux in pregnancy  Assessment & Plan:  - Reflux has improved substantially since initiating Pepcid. Continue to take as needed through pregnancy. 5. 31 weeks gestation of pregnancy  Assessment & Plan:  - PTL/PPROM/Bleeding precautions given. Kick counts reviewed. - Third trimester labs reviewed. 1 hour GTT was completed while taking metformin. I recommended today that she stop metformin now and repeat 1 hour GTT a week after stopping. She agrees. Order provided. - Reviewed plan for collection of GBS swab at 36 weeks. - Problem list updated, results console reviewed and updated with pertinent prenatal labs. - PMH, PSH, medications reviewed and updated as needed  - Return to office in 2 wk(s) for routine prenatal care    Orders:  -     POCT urine dip    6. History of cervical LEEP biopsy affecting care of mother, antepartum, second trimester    7. Encounter for other specified  screening  -     Glucose, 1H PG; Future  -     Glucose, 1H PG    8.  Polycystic ovarian syndrome  -     Glucose, 1H PG; Future  - Glucose, 1H PG        Subjective:   Bandar Miller is a 25 y.o. Stephen Lewin who presents for routine prenatal care at 31w2d. Complaints today: none  LOF: no; VB: no; Contractions: no; FM: yes    Objective:  /58   Ht 5' 3" (1.6 m)   Wt 85.7 kg (189 lb)   LMP 2022 (Exact Date)   BMI 33.48 kg/m²     General: Well appearing, no distress  Respiratory: Unlabored breathing  Cardiovascular: Regular rate. Abdomen: Soft, gravid, nontender  Extremities: Warm and well perfused. Non tender.     Pregravid Weight/BMI: 73.9 kg (163 lb) (BMI 28.88)  Current Weight: 85.7 kg (189 lb)   Total Weight Gain: 11.8 kg (26 lb)     Pre- Vitals    Flowsheet Row Most Recent Value   Prenatal Assessment    Fetal Heart Rate 140   Fundal Height (cm) 32 cm   Movement Present   Prenatal Vitals    Blood Pressure 100/58   Weight - Scale 85.7 kg (189 lb)   Urine Albumin/Glucose    Dilation/Effacement/Station    Vaginal Drainage    Draining Fluid No   Edema    LLE Edema None   RLE Edema None   Facial Edema None           Jeremie Maravilla MD  2023 5:48 PM

## 2023-08-02 NOTE — ASSESSMENT & PLAN NOTE
- Reflux has improved substantially since initiating Pepcid. Continue to take as needed through pregnancy.

## 2023-08-02 NOTE — LETTER
August 3, 2023     Govind Guadalupe MD  [address] [place] [address]  [address] [facility]    Patient: Jaci Kidd   YOB: 1995   Date of Visit: 8/2/2023       Dear Dr. Beverly Peters:    Thank you for referring Anna Marie Obando to me for evaluation. Below are my notes for this consultation. If you have questions, please do not hesitate to call me. I look forward to following your patient along with you. Sincerely,        Arlen Arce MD        CC: No Recipients    Arlen Arce MD  8/3/2023 11:04 PM  Sign when Signing Visit  Dr Beverly Peters and Dr Rock Burch wrote back and neither felt Merdis Schooling needed to be on her metformin so Dr Beverly Peters stopped her metformin and gave her another glucola order to complete off of her metformin. David Arce MD  8/3/2023 11:02 PM  Sign when Signing Visit  Cathy Friedman has no complaints today. She reports regular fetal movements and does not report any problems. She is here today at 31w2d for an ultrasound for fetal growth    Problem list:  1. Factor V Leiden heterozygous  2. Maternal PCOS on metformin 500 mg twice daily. She had normal Glucola screening on metformin. 3. Hx of leep  4. Total hypothyroidism on levothyroxine with a normal TSH of 2.55 on 7/7    Ultrasound findings: The ultrasound today shows normal interval fetal growth and fluid. Pregnancy ultrasound has limitations and is unable to detect all forms of fetal congenital abnormalities. The inaccuracy in the EFW can be off by 1 lb either way in the third trimester. Follow up recommended:   I will review her need for being on metformin during pregnancy with both her OB and her endocrinologist.  If she does not need to be on metformin then recommend she stop and recommend she repeat her Glucola screen from she does not have GDM. She reports that her father had multiple thrombosis and has the diagnosis of factor V Leiden and multiple myeloma.      It is impossible to know how much her father's thromboses have to do with his factor V Leiden mutation versus his history of myeloma's. With a first generation family history of thrombosis the recommendation is usually prophylactic Lovenox 40 mg subcu daily for 6 weeks postpartum. Maricarmen Jacobs would feel much better being on Lovenox for 6 weeks postpartum because she is exceedingly concerned about having a life-threatening thrombosis after delivery. She is seeing Dr Oscar Beebe for an ob visit later today. Pre visit time reviewing her records   10 minutes  Face to face time 8 minutes  Post visit time on documentation of note, updating her problem list, adding orders and prescriptions 15 minutes. Procedures that were completed today were charged separately. The level of decision making was moderate complexity.     Kofi Barker MD

## 2023-08-02 NOTE — ASSESSMENT & PLAN NOTE
- Must repeat one hour GTT due to taking Metformin concomitantly at time of 28 week one hour GTT. Vitor Camps verbalized understanding to discontinue Metformin for one week prior to obtaining updated GTT.

## 2023-08-02 NOTE — ASSESSMENT & PLAN NOTE
- PTL/PPROM/Bleeding precautions given. Kick counts reviewed. - Third trimester labs reviewed. 1 hour GTT was completed while taking metformin. I recommended today that she stop metformin now and repeat 1 hour GTT a week after stopping. She agrees. Order provided. - Reviewed plan for collection of GBS swab at 36 weeks. - Problem list updated, results console reviewed and updated with pertinent prenatal labs.   - PMH, PSH, medications reviewed and updated as needed  - Return to office in 2 wk(s) for routine prenatal care

## 2023-08-04 NOTE — PROGRESS NOTES
Dr Ramiro Garcia and Dr Torrance Lesches wrote back and neither felt Yadiel Serjio needed to be on her metformin so Dr Ramiro Garcia stopped her metformin and gave her another glucola order to complete off of her metformin.     Benji Tracy MD

## 2023-08-14 ENCOUNTER — PATIENT OUTREACH (OUTPATIENT)
Dept: OBGYN CLINIC | Facility: CLINIC | Age: 28
End: 2023-08-14

## 2023-08-14 ENCOUNTER — ROUTINE PRENATAL (OUTPATIENT)
Dept: OBGYN CLINIC | Facility: CLINIC | Age: 28
End: 2023-08-14
Payer: COMMERCIAL

## 2023-08-14 VITALS
HEIGHT: 63 IN | BODY MASS INDEX: 33.73 KG/M2 | SYSTOLIC BLOOD PRESSURE: 104 MMHG | DIASTOLIC BLOOD PRESSURE: 66 MMHG | WEIGHT: 190.4 LBS

## 2023-08-14 DIAGNOSIS — A60.09 GENITAL HERPES SIMPLEX VIRUS (HSV) INFECTION IN MOTHER AFFECTING PREGNANCY: ICD-10-CM

## 2023-08-14 DIAGNOSIS — K21.9 GASTROESOPHAGEAL REFLUX IN PREGNANCY: ICD-10-CM

## 2023-08-14 DIAGNOSIS — D68.51 FACTOR 5 LEIDEN MUTATION, HETEROZYGOUS (HCC): ICD-10-CM

## 2023-08-14 DIAGNOSIS — E07.9 THYROID DISEASE DURING PREGNANCY IN THIRD TRIMESTER: ICD-10-CM

## 2023-08-14 DIAGNOSIS — O34.42 HISTORY OF CERVICAL LEEP BIOPSY AFFECTING CARE OF MOTHER, ANTEPARTUM, SECOND TRIMESTER: ICD-10-CM

## 2023-08-14 DIAGNOSIS — Z3A.33 33 WEEKS GESTATION OF PREGNANCY: Primary | ICD-10-CM

## 2023-08-14 DIAGNOSIS — O99.619 GASTROESOPHAGEAL REFLUX IN PREGNANCY: ICD-10-CM

## 2023-08-14 DIAGNOSIS — O98.319 GENITAL HERPES SIMPLEX VIRUS (HSV) INFECTION IN MOTHER AFFECTING PREGNANCY: ICD-10-CM

## 2023-08-14 DIAGNOSIS — O99.283 THYROID DISEASE DURING PREGNANCY IN THIRD TRIMESTER: ICD-10-CM

## 2023-08-14 LAB
SL AMB  POCT GLUCOSE, UA: NORMAL
SL AMB POCT URINE PROTEIN: NORMAL

## 2023-08-14 PROCEDURE — PNV: Performed by: OBSTETRICS & GYNECOLOGY

## 2023-08-14 PROCEDURE — 81002 URINALYSIS NONAUTO W/O SCOPE: CPT | Performed by: OBSTETRICS & GYNECOLOGY

## 2023-08-14 NOTE — PROGRESS NOTES
Routine Prenatal Visit  [address] [place] [address], [address], [facility], [address] [facility],[address]    Assessment/Plan:  Joel Nunez is a 25y.o. year old  at 33w0d who presents for routine prenatal visit. 1. 33 weeks gestation of pregnancy  -     POCT urine dip    2. Factor 5 Leiden mutation, heterozygous ([address] [facility])    3. Thyroid disease during pregnancy in third trimester    4. Genital herpes simplex virus (HSV) infection in mother affecting pregnancy    5. History of cervical LEEP biopsy affecting care of mother, antepartum, second trimester    6. Gastroesophageal reflux in pregnancy        Next OB Visit 2 weeks. Subjective:     CC: Prenatal care    Joel Hernandez is a 25 y.o.  female who presents for routine prenatal care at 33w0d. Pregnancy ROS: no leakage of fluid, pelvic pain, or vaginal bleeding. normal fetal movement. The following portions of the patient's history were reviewed and updated as appropriate: allergies, current medications, past family history, past medical history, obstetric history, gynecologic history, past social history, past surgical history and problem list.      Objective:  /66 (BP Location: Left arm, Patient Position: Sitting, Cuff Size: Standard)   Ht 5' 3" (1.6 m)   Wt 86.4 kg (190 lb 6.4 oz)   LMP 2022 (Exact Date)   BMI 33.73 kg/m²   Pregravid Weight/BMI: 73.9 kg (163 lb) (BMI 28.88)  Current Weight: 86.4 kg (190 lb 6.4 oz)   Total Weight Gain: 12.4 kg (27 lb 6.4 oz)   Pre- Vitals    Flowsheet Row Most Recent Value   Prenatal Assessment    Fetal Heart Rate 140   Fundal Height (cm) 33 cm   Movement Present   Prenatal Vitals    Blood Pressure 104/66   Weight - Scale 86.4 kg (190 lb 6.4 oz)   Urine Albumin/Glucose    Dilation/Effacement/Station    Vaginal Drainage    Draining Fluid No   Edema            General: Well appearing, no distress  Abdomen: Soft, gravid, nontender  Extremities: Non tender.

## 2023-08-14 NOTE — PROGRESS NOTES
SW met with patient after her appointment to confirm time for MA application next week. SW and patient agreed to meet at [facility] at 9:30AM on Monday 08/21/23. Patient confirmed she would only like to apply for MA benefits at this time (no SNAP or EBT). Patient and SW reviewed required documents - patient will have these ready by next week. Patient will be filing for unemployment this Friday, 08/18/23, after her last shift with current employer. Patient had no further questions at this time, SW and patient to meet again next week.

## 2023-08-15 ENCOUNTER — TELEPHONE (OUTPATIENT)
Dept: OTHER | Facility: OTHER | Age: 28
End: 2023-08-15

## 2023-08-15 LAB — GLUCOSE 1H P 50 G GLC PO SERPL-MCNC: 109 MG/DL (ref 70–139)

## 2023-08-16 NOTE — TELEPHONE ENCOUNTER
Patient is calling regarding cancelling an appointment.     Date/Time: 08/16/2023 8:45am    Patient was rescheduled: YES [] NO [x]    Patient requesting call back to reschedule: YES [x] NO []    Pt would like a call back from office preferably in the afternoon

## 2023-08-21 ENCOUNTER — PATIENT OUTREACH (OUTPATIENT)
Dept: OBGYN CLINIC | Facility: CLINIC | Age: 28
End: 2023-08-21
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
## 2023-08-26 ENCOUNTER — CLINICAL SUPPORT (OUTPATIENT)
Age: 28
End: 2023-08-26

## 2023-08-26 DIAGNOSIS — Z32.2 ENCOUNTER FOR CHILDBIRTH INSTRUCTION: Primary | ICD-10-CM

## 2023-08-28 ENCOUNTER — PATIENT OUTREACH (OUTPATIENT)
Dept: OBGYN CLINIC | Facility: CLINIC | Age: 28
End: 2023-08-28

## 2023-08-28 ENCOUNTER — HOSPITAL ENCOUNTER (INPATIENT)
Facility: HOSPITAL | Age: 28
LOS: 2 days | Discharge: HOME | End: 2023-08-30
Attending: OBSTETRICS & GYNECOLOGY | Admitting: OBSTETRICS & GYNECOLOGY
Payer: COMMERCIAL

## 2023-08-28 DIAGNOSIS — D68.51 FACTOR 5 LEIDEN MUTATION, HETEROZYGOUS (HCC): Primary | ICD-10-CM

## 2023-08-28 LAB
ABO GROUP BLD: NORMAL
ANION GAP SERPL CALCULATED.3IONS-SCNC: 8 MMOL/L
BASOPHILS # BLD AUTO: 0.06 THOUSANDS/ÂΜL (ref 0–0.1)
BASOPHILS NFR BLD AUTO: 0 % (ref 0–1)
BLD GP AB SCN SERPL QL: NEGATIVE
BUN SERPL-MCNC: 5 MG/DL (ref 5–25)
CALCIUM SERPL-MCNC: 9.3 MG/DL (ref 8.4–10.2)
CHLORIDE SERPL-SCNC: 104 MMOL/L (ref 96–108)
CO2 SERPL-SCNC: 22 MMOL/L (ref 21–32)
CREAT SERPL-MCNC: 0.5 MG/DL (ref 0.6–1.3)
EOSINOPHIL # BLD AUTO: 0.17 THOUSAND/ÂΜL (ref 0–0.61)
EOSINOPHIL NFR BLD AUTO: 1 % (ref 0–6)
ERYTHROCYTE [DISTWIDTH] IN BLOOD BY AUTOMATED COUNT: 13.4 % (ref 11.6–15.1)
GFR SERPL CREATININE-BSD FRML MDRD: 130 ML/MIN/1.73SQ M
GLUCOSE SERPL-MCNC: 111 MG/DL (ref 65–140)
HCT VFR BLD AUTO: 40.1 % (ref 34.8–46.1)
HGB BLD-MCNC: 12.8 G/DL (ref 11.5–15.4)
IMM GRANULOCYTES # BLD AUTO: 0.12 THOUSAND/UL (ref 0–0.2)
IMM GRANULOCYTES NFR BLD AUTO: 1 % (ref 0–2)
LYMPHOCYTES # BLD AUTO: 3.59 THOUSANDS/ÂΜL (ref 0.6–4.47)
LYMPHOCYTES NFR BLD AUTO: 24 % (ref 14–44)
MCH RBC QN AUTO: 30.1 PG (ref 26.8–34.3)
MCHC RBC AUTO-ENTMCNC: 31.9 G/DL (ref 31.4–37.4)
MCV RBC AUTO: 94 FL (ref 82–98)
MONOCYTES # BLD AUTO: 0.69 THOUSAND/ÂΜL (ref 0.17–1.22)
MONOCYTES NFR BLD AUTO: 5 % (ref 4–12)
NEUTROPHILS # BLD AUTO: 10.55 THOUSANDS/ÂΜL (ref 1.85–7.62)
NEUTS SEG NFR BLD AUTO: 69 % (ref 43–75)
NRBC BLD AUTO-RTO: 0 /100 WBCS
PLATELET # BLD AUTO: 344 THOUSANDS/UL (ref 149–390)
PMV BLD AUTO: 8.7 FL (ref 8.9–12.7)
POTASSIUM SERPL-SCNC: 3.6 MMOL/L (ref 3.5–5.3)
RBC # BLD AUTO: 4.25 MILLION/UL (ref 3.81–5.12)
RH BLD: POSITIVE
SODIUM SERPL-SCNC: 134 MMOL/L (ref 135–147)
SPECIMEN EXPIRATION DATE: NORMAL
WBC # BLD AUTO: 15.18 THOUSAND/UL (ref 4.31–10.16)

## 2023-08-28 PROCEDURE — 59414 DELIVER PLACENTA: CPT | Performed by: OBSTETRICS & GYNECOLOGY

## 2023-08-28 PROCEDURE — 86901 BLOOD TYPING SEROLOGIC RH(D): CPT | Performed by: OBSTETRICS & GYNECOLOGY

## 2023-08-28 PROCEDURE — NC001 PR NO CHARGE: Performed by: OBSTETRICS & GYNECOLOGY

## 2023-08-28 PROCEDURE — 85025 COMPLETE CBC W/AUTO DIFF WBC: CPT | Performed by: OBSTETRICS & GYNECOLOGY

## 2023-08-28 PROCEDURE — 80048 BASIC METABOLIC PNL TOTAL CA: CPT | Performed by: OBSTETRICS & GYNECOLOGY

## 2023-08-28 PROCEDURE — 86900 BLOOD TYPING SEROLOGIC ABO: CPT | Performed by: OBSTETRICS & GYNECOLOGY

## 2023-08-28 PROCEDURE — 86780 TREPONEMA PALLIDUM: CPT | Performed by: OBSTETRICS & GYNECOLOGY

## 2023-08-28 PROCEDURE — 86850 RBC ANTIBODY SCREEN: CPT | Performed by: OBSTETRICS & GYNECOLOGY

## 2023-08-28 RX ORDER — IBUPROFEN 600 MG/1
600 TABLET ORAL EVERY 6 HOURS PRN
Status: DISCONTINUED | OUTPATIENT
Start: 2023-08-28 | End: 2023-08-30 | Stop reason: HOSPADM

## 2023-08-28 RX ORDER — ONDANSETRON 2 MG/ML
4 INJECTION INTRAMUSCULAR; INTRAVENOUS EVERY 8 HOURS PRN
Status: DISCONTINUED | OUTPATIENT
Start: 2023-08-28 | End: 2023-08-30 | Stop reason: HOSPADM

## 2023-08-28 RX ORDER — OXYCODONE HYDROCHLORIDE 5 MG/1
5 TABLET ORAL
Status: DISCONTINUED | OUTPATIENT
Start: 2023-08-28 | End: 2023-08-30 | Stop reason: HOSPADM

## 2023-08-28 RX ORDER — SODIUM CHLORIDE, SODIUM LACTATE, POTASSIUM CHLORIDE, CALCIUM CHLORIDE 600; 310; 30; 20 MG/100ML; MG/100ML; MG/100ML; MG/100ML
125 INJECTION, SOLUTION INTRAVENOUS CONTINUOUS
Status: DISCONTINUED | OUTPATIENT
Start: 2023-08-28 | End: 2023-08-30 | Stop reason: HOSPADM

## 2023-08-28 RX ORDER — METHYLERGONOVINE MALEATE 0.2 MG/ML
INJECTION INTRAVENOUS
Status: COMPLETED
Start: 2023-08-28 | End: 2023-08-28

## 2023-08-28 RX ORDER — DIAPER,BRIEF,INFANT-TODD,DISP
1 EACH MISCELLANEOUS DAILY PRN
Status: DISCONTINUED | OUTPATIENT
Start: 2023-08-28 | End: 2023-08-30 | Stop reason: HOSPADM

## 2023-08-28 RX ORDER — OXYTOCIN 10 [USP'U]/ML
INJECTION, SOLUTION INTRAMUSCULAR; INTRAVENOUS
Status: DISPENSED
Start: 2023-08-28 | End: 2023-08-29

## 2023-08-28 RX ORDER — OXYTOCIN/RINGER'S LACTATE 30/500 ML
PLASTIC BAG, INJECTION (ML) INTRAVENOUS
Status: COMPLETED
Start: 2023-08-28 | End: 2023-08-28

## 2023-08-28 RX ORDER — NALBUPHINE HYDROCHLORIDE 10 MG/ML
5 INJECTION, SOLUTION INTRAMUSCULAR; INTRAVENOUS; SUBCUTANEOUS
Status: DISCONTINUED | OUTPATIENT
Start: 2023-08-28 | End: 2023-08-28

## 2023-08-28 RX ORDER — LEVOTHYROXINE SODIUM 112 UG/1
112 TABLET ORAL
Status: DISCONTINUED | OUTPATIENT
Start: 2023-08-29 | End: 2023-08-30 | Stop reason: HOSPADM

## 2023-08-28 RX ORDER — LIDOCAINE HYDROCHLORIDE 10 MG/ML
INJECTION, SOLUTION EPIDURAL; INFILTRATION; INTRACAUDAL; PERINEURAL
Status: DISCONTINUED
Start: 2023-08-28 | End: 2023-08-28 | Stop reason: WASHOUT

## 2023-08-28 RX ORDER — OXYTOCIN/RINGER'S LACTATE 30/500 ML
PLASTIC BAG, INJECTION (ML) INTRAVENOUS
Status: COMPLETED
Start: 2023-08-28 | End: 2023-08-29

## 2023-08-28 RX ORDER — DOCUSATE SODIUM 100 MG/1
100 CAPSULE, LIQUID FILLED ORAL 2 TIMES DAILY
Status: DISCONTINUED | OUTPATIENT
Start: 2023-08-28 | End: 2023-08-30 | Stop reason: HOSPADM

## 2023-08-28 RX ORDER — CALCIUM CARBONATE 500 MG/1
1000 TABLET, CHEWABLE ORAL DAILY PRN
Status: DISCONTINUED | OUTPATIENT
Start: 2023-08-28 | End: 2023-08-30 | Stop reason: HOSPADM

## 2023-08-28 RX ORDER — DIPHENHYDRAMINE HYDROCHLORIDE 50 MG/ML
25 INJECTION INTRAMUSCULAR; INTRAVENOUS EVERY 6 HOURS PRN
Status: DISCONTINUED | OUTPATIENT
Start: 2023-08-28 | End: 2023-08-30 | Stop reason: HOSPADM

## 2023-08-28 RX ORDER — ENOXAPARIN SODIUM 100 MG/ML
40 INJECTION SUBCUTANEOUS DAILY
Status: DISCONTINUED | OUTPATIENT
Start: 2023-08-29 | End: 2023-08-30 | Stop reason: HOSPADM

## 2023-08-28 RX ADMIN — NALBUPHINE HYDROCHLORIDE 5 MG: 10 INJECTION, SOLUTION INTRAMUSCULAR; INTRAVENOUS; SUBCUTANEOUS at 20:10

## 2023-08-28 RX ADMIN — WITCH HAZEL 1 PAD: 500 SOLUTION RECTAL; TOPICAL at 22:33

## 2023-08-28 RX ADMIN — NALBUPHINE HYDROCHLORIDE 5 MG: 10 INJECTION, SOLUTION INTRAMUSCULAR; INTRAVENOUS; SUBCUTANEOUS at 20:22

## 2023-08-28 RX ADMIN — BENZOCAINE AND LEVOMENTHOL 1 APPLICATION: 200; 5 SPRAY TOPICAL at 22:33

## 2023-08-28 RX ADMIN — DOCUSATE SODIUM 100 MG: 100 CAPSULE, LIQUID FILLED ORAL at 21:53

## 2023-08-28 RX ADMIN — Medication 30 UNITS: at 20:36

## 2023-08-28 RX ADMIN — Medication 125 MILLI-UNITS/MIN: at 22:30

## 2023-08-28 RX ADMIN — HYDROCORTISONE 1 APPLICATION: 1 CREAM TOPICAL at 22:33

## 2023-08-28 RX ADMIN — SODIUM CHLORIDE, SODIUM LACTATE, POTASSIUM CHLORIDE, AND CALCIUM CHLORIDE 125 ML/HR: .6; .31; .03; .02 INJECTION, SOLUTION INTRAVENOUS at 21:16

## 2023-08-28 RX ADMIN — METHYLERGONOVINE MALEATE 0.2 MG: 0.2 INJECTION INTRAVENOUS at 20:22

## 2023-08-28 NOTE — PROGRESS NOTES
SW called patient regarding MA application - per [facility] site, it is still in process. SW inquired if patient had received copies of medical bills from last three months. Patient confirmed she received them on Saturday and still needs to go through them to determine which are viable bills and which are outside of the 3-month window. Patient has an appointment at [facility] tomorrow at 8:20 am - plans to bring copies with her to this visit for SW to upload into [employer].

## 2023-08-29 LAB
BASOPHILS # BLD AUTO: 0.03 THOUSANDS/ÂΜL (ref 0–0.1)
BASOPHILS NFR BLD AUTO: 0 % (ref 0–1)
EOSINOPHIL # BLD AUTO: 0.03 THOUSAND/ÂΜL (ref 0–0.61)
EOSINOPHIL NFR BLD AUTO: 0 % (ref 0–6)
ERYTHROCYTE [DISTWIDTH] IN BLOOD BY AUTOMATED COUNT: 13.3 % (ref 11.6–15.1)
HCT VFR BLD AUTO: 32 % (ref 34.8–46.1)
HGB BLD-MCNC: 10.6 G/DL (ref 11.5–15.4)
IMM GRANULOCYTES # BLD AUTO: 0.1 THOUSAND/UL (ref 0–0.2)
IMM GRANULOCYTES NFR BLD AUTO: 1 % (ref 0–2)
LYMPHOCYTES # BLD AUTO: 1.5 THOUSANDS/ÂΜL (ref 0.6–4.47)
LYMPHOCYTES NFR BLD AUTO: 8 % (ref 14–44)
MCH RBC QN AUTO: 30.4 PG (ref 26.8–34.3)
MCHC RBC AUTO-ENTMCNC: 33.1 G/DL (ref 31.4–37.4)
MCV RBC AUTO: 92 FL (ref 82–98)
MONOCYTES # BLD AUTO: 1.39 THOUSAND/ÂΜL (ref 0.17–1.22)
MONOCYTES NFR BLD AUTO: 7 % (ref 4–12)
NEUTROPHILS # BLD AUTO: 15.71 THOUSANDS/ÂΜL (ref 1.85–7.62)
NEUTS SEG NFR BLD AUTO: 84 % (ref 43–75)
NRBC BLD AUTO-RTO: 0 /100 WBCS
PLATELET # BLD AUTO: 236 THOUSANDS/UL (ref 149–390)
PMV BLD AUTO: 8.8 FL (ref 8.9–12.7)
RBC # BLD AUTO: 3.49 MILLION/UL (ref 3.81–5.12)
TREPONEMA PALLIDUM IGG+IGM AB [PRESENCE] IN SERUM OR PLASMA BY IMMUNOASSAY: NORMAL
WBC # BLD AUTO: 18.76 THOUSAND/UL (ref 4.31–10.16)

## 2023-08-29 PROCEDURE — 85025 COMPLETE CBC W/AUTO DIFF WBC: CPT | Performed by: OBSTETRICS & GYNECOLOGY

## 2023-08-29 PROCEDURE — 99024 POSTOP FOLLOW-UP VISIT: CPT | Performed by: STUDENT IN AN ORGANIZED HEALTH CARE EDUCATION/TRAINING PROGRAM

## 2023-08-29 PROCEDURE — 99213 OFFICE O/P EST LOW 20 MIN: CPT

## 2023-08-29 RX ADMIN — IBUPROFEN 600 MG: 600 TABLET ORAL at 13:31

## 2023-08-29 RX ADMIN — ENOXAPARIN SODIUM 40 MG: 100 INJECTION SUBCUTANEOUS at 08:17

## 2023-08-29 RX ADMIN — DOCUSATE SODIUM 100 MG: 100 CAPSULE, LIQUID FILLED ORAL at 17:43

## 2023-08-29 RX ADMIN — OXYCODONE HYDROCHLORIDE 5 MG: 5 TABLET ORAL at 13:31

## 2023-08-29 RX ADMIN — IBUPROFEN 600 MG: 600 TABLET ORAL at 08:18

## 2023-08-29 RX ADMIN — OXYCODONE HYDROCHLORIDE 5 MG: 5 TABLET ORAL at 22:57

## 2023-08-29 RX ADMIN — LEVOTHYROXINE SODIUM 112 MCG: 112 TABLET ORAL at 06:23

## 2023-08-29 RX ADMIN — Medication 1 TABLET: at 08:18

## 2023-08-29 RX ADMIN — DOCUSATE SODIUM 100 MG: 100 CAPSULE, LIQUID FILLED ORAL at 08:18

## 2023-08-29 NOTE — PROGRESS NOTES
Progress Note - OB/GYN  Post-Partum Physician Note   Delon Callow 25 y.o. female MRN: [id number]  Unit/Bed#:  207-01 Encounter: [id number]    Assessment:  25y.o. year-old , postpartum day #1 s/p     Plan:   Continue routine postpartum care  Encourage ambulation    _________________________________    Subjective:   Pain: Well controlled  Tolerating Oral Intake: Yes  Voiding: Yes  Ambulating: Yes  Breastfeeding: Yes  Chest Pain: No  Shortness of Breath: No  Leg Pain/Discomfort: No  Lochia: Normal    Objective:   Vitals:    23 2200 23 2230 23 0308 23 0700   BP: 128/83 140/80 111/62 126/86   BP Location: Right arm Right arm Left arm Left arm   Pulse: 77 80 83 83   Resp:   18 16   Temp:  98 °F (36.7 °C) 98.5 °F (36.9 °C) 98.6 °F (37 °C)   TempSrc:  Oral Temporal Oral   SpO2:  100% 98% 97%   Weight:       Height:           Intake/Output Summary (Last 24 hours) at 2023 1106  Last data filed at 2023 0100  Gross per 24 hour   Intake --   Output 988 ml   Net -988 ml       Physical Exam:  General: in no apparent distress, alert, oriented times 3 and afebrile  Abdomen: abdomen is soft without significant tenderness, masses, organomegaly or guarding  Fundus: Firm and non-tender, 1 below the umbilicus  Lower extremeties: nontender    Labs/Tests:   Lab Results   Component Value Date/Time    HGB 10.6 (L) 2023 06:00 AM    HGB 12.8 2023 08:00 PM     2023 06:00 AM     2023 08:00 PM    WBC 18.76 (H) 2023 06:00 AM    WBC 15.18 (H) 2023 08:00 PM    CREATININE 0.50 (L) 2023 11:29 PM        Brief OB Lab review:  ABO Grouping   Date Value Ref Range Status   2023 O  Final      Rh Factor   Date Value Ref Range Status   2023 Positive  Final     Rh Type   Date Value Ref Range Status   2023 Positive  Final     Comment:     Please note: Prior records for this patient's ABO / Rh type are not  available for additional verification.       No results found for: "ANTIBODYSCR"  No results found for: "RUBM"    MEDS:   Current Facility-Administered Medications   Medication Dose Route Frequency   • benzocaine-menthol-lanolin-aloe (DERMOPLAST) 20-0.5 % topical spray 1 Application  1 Application Topical G8D PRN   • calcium carbonate (TUMS) chewable tablet 1,000 mg  1,000 mg Oral Daily PRN   • diphenhydrAMINE (BENADRYL) injection 25 mg  25 mg Intravenous Q6H PRN   • docusate sodium (COLACE) capsule 100 mg  100 mg Oral BID   • enoxaparin (LOVENOX) subcutaneous injection 40 mg  40 mg Subcutaneous Daily   • hydrocortisone 1 % cream 1 Application  1 Application Topical Daily PRN   • ibuprofen (MOTRIN) tablet 600 mg  600 mg Oral Q6H PRN   • lactated ringers infusion  125 mL/hr Intravenous Continuous   • levothyroxine tablet 112 mcg  112 mcg Oral Early Morning   • ondansetron (ZOFRAN) injection 4 mg  4 mg Intravenous Q8H PRN   • oxyCODONE (ROXICODONE) IR tablet 5 mg  5 mg Oral Q3H PRN   • prenatal multivitamin tablet 1 tablet  1 tablet Oral Daily   • witch hazel-glycerin (TUCKS) topical pad 1 Pad  1 Pad Topical Q4H PRN     Invasive Devices     Peripheral Intravenous Line  Duration           Peripheral IV 08/28/23 Left Antecubital <1 day                  Kenya Alonzo MD  8/29/2023 11:06 AM

## 2023-08-29 NOTE — PLAN OF CARE

## 2023-08-29 NOTE — PLAN OF CARE

## 2023-08-29 NOTE — H&P
OB H&P  Rajiv Kidd  1995  /-      25 y.o. yo  at 35 weeks by us and lmp    Chief complaint:  Delivery in [facility]    HPI:  Pt presents with  Having delivered her baby in the lobby as she was coming in for labor evaluation. Pt states back pain that felt like constipation sincearound 6 or 7 am.  SROM clear fluid in lobby and baby delivered immediately in wheelchair. Did not call to L&D from [facility] but father pushed mother up to L&D in wheelchair. When we first checked at [address] baby was noted to be delivered in wheelchair with mother kneeling above it. Jamiele Leyden immediately to labor room, cord clamped and cut, infant handed to neonatology and mother placed in bed         Pregnancy Complications:  Patient Active Problem List   Diagnosis   • Anxiety   • Recurrent major depressive disorder, in partial remission ([address])   • Acquired hypothyroidism   • Polycystic ovarian syndrome   • Herpes simplex infection of genitourinary system   • Human papilloma virus   • History of cervical LEEP biopsy affecting care of mother, antepartum, second trimester   • Thyroid disease during pregnancy in third trimester   • 33 weeks gestation of pregnancy   • Genital herpes simplex virus (HSV) infection in mother affecting pregnancy   • Multinodular thyroid   •  screening encounter   • Acute vaginitis   • Factor 5 Leiden mutation, heterozygous ([address])   • Gastroesophageal reflux in pregnancy   • Encounter for other specified  screening   • Normal labor and delivery         Past Medical History:  Past Medical History:   Diagnosis Date   • Anxiety and depression     previously on Cymbalta, stopped before 2022 due to cost of visits. Currently no medication and self-manage. • Factor 5 Leiden mutation, heterozygous [facility])    • Genital herpes     Last out break per pt months ago (23). On Valtrex only w/outbreak.    • History of loop electrical excision procedure (LEEP) 2019   • Human papilloma virus    • Hypothyroid     Currently on Levothyroxine 25mg and in the process of finding a new endocrinologist. Does not remember when last TSH check. • Miscarriage 9/16/22    This was my first pregnancy. The miscarriage started Friday, 9/16/22   • PCOS (polycystic ovarian syndrome)     Currently on metformin 500 mg x2 daily. Past Surgical History:  Past Surgical History:   Procedure Laterality Date   • CERVICAL BIOPSY  W/ LOOP ELECTRODE EXCISION     • US GUIDED THYROID BIOPSY  5/2/2023   • US GUIDED THYROID BIOPSY  8/9/2019   • WISDOM TOOTH EXTRACTION         Social Hx:  Social History     Socioeconomic History   • Marital status: Single     Spouse name: Not on file   • Number of children: Not on file   • Years of education: Not on file   • Highest education level: Not on file   Occupational History   • Not on file   Tobacco Use   • Smoking status: Former     Packs/day: 0.25     Years: 2.00     Total pack years: 0.50     Types: Cigarettes     Passive exposure: Never   • Smokeless tobacco: Never   • Tobacco comments:     As soon as I found out that I was pregnant, I completely stopped smoking.    Vaping Use   • Vaping Use: Never used   Substance and Sexual Activity   • Alcohol use: Not Currently     Comment: I rarely drink   • Drug use: Never   • Sexual activity: Yes     Partners: Male     Birth control/protection: None     Comment: no new partner in past year   Other Topics Concern   • Not on file   Social History Narrative   • Not on file     Social Determinants of Health     Financial Resource Strain: Not on file   Food Insecurity: Not on file   Transportation Needs: Not on file   Physical Activity: Not on file   Stress: Not on file   Social Connections: Not on file   Intimate Partner Violence: Not At Risk (6/7/2022)    Humiliation, Afraid, Rape, and Kick questionnaire    • Fear of Current or Ex-Partner: No    • Emotionally Abused: No    • Physically Abused: No    • Sexually Abused: No   Housing Stability: Not on file       Meds:  No current facility-administered medications on file prior to encounter. Current Outpatient Medications on File Prior to Encounter   Medication Sig Dispense Refill   • levothyroxine 112 mcg tablet TAKE 1 TABLET BY MOUTH EVERY DAY 30 tablet 3   • Magnesium 400 MG CAPS Take 400 mg by mouth daily at bedtime     • Prenatal Vit-Fe Fumarate-FA (PRENATAL VITAMINS PO) Take by mouth     • sucralfate (CARAFATE) 1 g tablet TAKE 1 TABLET BY MOUTH EVERY 6 HOURS AS NEEDED FOR ACID REFLUX     • valACYclovir (VALTREX) 500 mg tablet if needed Takes when needed         Allergies:  No Known Allergies    Obstetric History:    G2 P 0010  35 weeks    Labs:  No results found for: "STREPGRPB"  Type & Screen:  ABO Grouping   Date Value Ref Range Status   08/28/2023 O  Final      Rh Factor   Date Value Ref Range Status   08/28/2023 Positive  Final     Rh Type   Date Value Ref Range Status   03/08/2023 Positive  Final     Comment:     Please note: Prior records for this patient's ABO / Rh type are not  available for additional verification. No results found for: "ANTIBODYSCR"    Specimen Expiration Date   Date Value Ref Range Status   08/28/2023 [id number]  Final     No results found for: "HIVAGAB"  Hepatitis B Surface Ag   Date Value Ref Range Status   03/08/2023 neg  Final     RPR   Date Value Ref Range Status   07/07/2023 Non Reactive Non Reactive Final     No results found for: "RUBELLAIGGQT"  Glucose   Date Value Ref Range Status   08/14/2023 109 70 - 139 mg/dL Final     Comment:     According to ADA, a glucose threshold of >139 mg/dL after 50-gram  load identifies approximately 80% of women with gestational  diabetes mellitus, while the sensitivity is further increased to  approximately 90% by a threshold of >129 mg/dL. Physical Exam:  Vital signs: There were no vitals filed for this visit.     Heart: RRR  Lungs: clear to ausculation   Abdomen: soft, tender to deep palpation, fundus at umbilicus after delivery of placenta  Extremeties: min edema, no johnny's or fritz's sign  Presentation: vertex  Cervix: s/p delivery  FHR: never auscultated        Assessment/plan   at 35 weeks. S/p precipitous delivery once here, but probably in labor all day unrecognized  :   After cord clamped and cut and  handed to neonatology, pt placed in bed and placenta delivered without difficulty. IV was begun in time to use IV pitocin after delivery of placenta.]  Pt noted to have 2 small superficial tears bilateral labia which were hemostatic. Perineum intact. Pt did have constant trickle from vagina, although mild. Pt given nubain as she was unable to tolerate thorough exam to rule out higher tears. Given methergine and decreased trickle. After nubain able to place speculum and see sulci, with no obvious tears. As pt still uncomfortable procedure terminated. Normal lochia at that point. No GBS done as before 36 weeks and delivered with arrival to floor so no Abx given.    aware

## 2023-08-29 NOTE — LACTATION NOTE
This note was copied from a baby's chart. CONSULT - LACTATION  Baby Girl Alexys Kidd 1 days female MRN: [id number]    [address] [facility] NURSERY Room / Bed:  207(N)/ 207(N) Encounter: [id number]    Maternal Information     MOTHER:  Lupis Kidd  Maternal Age: 25 y.o.   OB History: # 1 - Date: 2022, Sex: None, Weight: None, GA: None, Delivery: Spontaneous , Apgar1: None, Apgar5: None, Living: None, Birth Comments: chemical pregnancy    # 2 - Date: 23, Sex: Female, Weight: 2790 g (6 lb 2.4 oz), GA: 35w0d, Delivery: Vaginal, Spontaneous, Apgar1: 8, Apgar5: 9, Living: Living, Birth Comments: born in wheelchair en route to labor room. Dr Heather Reyes called to bedside asap. Previouse breast reduction surgery? No    Lactation history:   Has patient previously breast fed: No   How long had patient previously breast fed:     Previous breast feeding complications:       Past Surgical History:   Procedure Laterality Date   • CERVICAL BIOPSY  W/ LOOP ELECTRODE EXCISION     • US GUIDED THYROID BIOPSY  2023   • US GUIDED THYROID BIOPSY  2019   • WISDOM TOOTH EXTRACTION          Birth information:  YOB: 2025   Time of birth: 7:50 PM   Sex: female   Delivery type: Vaginal, Spontaneous   Birth Weight: 2790 g (6 lb 2.4 oz)   Percent of Weight Change: 0%     Gestational Age: 35w0d   [unfilled]    Assessment     Breast and nipple assessment: normal assessment     Assessment: sleepy, tight, uncoordinated shallow opening of mouth.  Improved with suck training slightly    Feeding assessment: paced bottle fed 10 ml of expressed colostrum  LATCH:  Latch: Repeated attempts, hold nipple in mouth, stimulate to suck   Audible Swallowing: A few with stimulation   Type of Nipple: Everted (After stimulation)   Comfort (Breast/Nipple): Soft/non-tender   Hold (Positioning): Partial assist, teach one side, mother does other, staff holds   DEPL CENTER Score: 7 Feeding recommendations:  breastfeed on demand, offering both breast if possible. Use manual breast pump on both sides and provided expressed colostrum/breastmilk as supplement for feedings using paced bottle feeding      Met with parents to discuss feeding plan. Mother discussed that she desires to breastfeed and that baby has latched but not for long. The Ready, Set, Baby Booklet was discussed. Discussed importance of skin to skin to help baby awaken for breastfeeding, to help with milk production as well as stabilize temperature, blood sugars, decrease pain, promote relaxation, and calm the baby as well as for bonding that father may do as well. Showed images of tummy size progression as milk production increases to meet the nutritional/growing needs of the baby. Discussed alternative feeding methods as a manner to provide baby with additional colostrum/breast milk if baby is sleepy and/or unable to breastfeed directly to help protect the milk supply and preserve latching abilities at the breast. Mother was encouraged to hand express after feedings to provide "dessert" and when sleepy to hand express to provide "snacks" which could help baby to awaken for a feeding. Discussed “Second Night Syndrome” explaining how baby’s cluster feeds to meet growing needs. Growth spurts periods were discussed within the first year and how cluster feeding helps boost milk supply. Explained feeding cues and fullness cues as well as importance of obtaining a deep latch for effective milk removal and proper positioning (tummy to tummy, at level, nose to nipple, bring chin to breast first and bringing baby to breast) with ear, shoulder, and hip alignment. Demonstrated on breast model how to hold, compress and perform hand expression. Mother was able to perform well providing 10 drops of expressed colostrum during suck training.  Baby did not fed at the breast. Mother pumped 10 ml from both sides that was paced bottle fed to baby. Mother was given handout on "Breastfeeding the /Late  Infant" that was discussed and discussed importance of pumping to protect supply that was initiated during encounter. Addressed breast pump needs and mother discussed that she has a breast pump for home use. Parents were made aware of how to communicate with lactation and encouraged to reach out for continued support and/or questions that arise.       Tania Vivar RN 2023 1:14 PM

## 2023-08-30 ENCOUNTER — PATIENT OUTREACH (OUTPATIENT)
Dept: OBGYN CLINIC | Facility: CLINIC | Age: 28
End: 2023-08-30

## 2023-08-30 ENCOUNTER — TELEPHONE (OUTPATIENT)
Dept: LABOR AND DELIVERY | Facility: HOSPITAL | Age: 28
End: 2023-08-30

## 2023-08-30 VITALS
TEMPERATURE: 98.3 F | HEART RATE: 93 BPM | OXYGEN SATURATION: 97 % | SYSTOLIC BLOOD PRESSURE: 134 MMHG | DIASTOLIC BLOOD PRESSURE: 79 MMHG | BODY MASS INDEX: 33.73 KG/M2 | RESPIRATION RATE: 16 BRPM | WEIGHT: 190.4 LBS | HEIGHT: 63 IN

## 2023-08-30 PROBLEM — Z3A.35 35 WEEKS GESTATION OF PREGNANCY: Status: ACTIVE | Noted: 2023-03-29

## 2023-08-30 PROCEDURE — 99024 POSTOP FOLLOW-UP VISIT: CPT | Performed by: STUDENT IN AN ORGANIZED HEALTH CARE EDUCATION/TRAINING PROGRAM

## 2023-08-30 PROCEDURE — NC001 PR NO CHARGE: Performed by: STUDENT IN AN ORGANIZED HEALTH CARE EDUCATION/TRAINING PROGRAM

## 2023-08-30 RX ORDER — IBUPROFEN 200 MG
200 TABLET ORAL EVERY 6 HOURS PRN
Start: 2023-08-30

## 2023-08-30 RX ORDER — DOCUSATE SODIUM 100 MG/1
100 CAPSULE, LIQUID FILLED ORAL 2 TIMES DAILY PRN
Refills: 0
Start: 2023-08-30

## 2023-08-30 RX ORDER — ENOXAPARIN SODIUM 100 MG/ML
40 INJECTION SUBCUTANEOUS DAILY
Qty: 16.8 ML | Refills: 0 | Status: SHIPPED | OUTPATIENT
Start: 2023-08-31 | End: 2023-10-12

## 2023-08-30 RX ADMIN — DOCUSATE SODIUM 100 MG: 100 CAPSULE, LIQUID FILLED ORAL at 18:04

## 2023-08-30 RX ADMIN — ENOXAPARIN SODIUM 40 MG: 100 INJECTION SUBCUTANEOUS at 08:02

## 2023-08-30 RX ADMIN — Medication 1 TABLET: at 08:02

## 2023-08-30 RX ADMIN — LEVOTHYROXINE SODIUM 112 MCG: 112 TABLET ORAL at 08:01

## 2023-08-30 RX ADMIN — DOCUSATE SODIUM 100 MG: 100 CAPSULE, LIQUID FILLED ORAL at 08:02

## 2023-08-30 NOTE — PROGRESS NOTES
Progress Note - OB/GYN  Post-Partum Physician Note   Thelma Rhodes 25 y.o. female MRN: [id number]  Unit/Bed#:  207-01 Encounter: [id number]    Assessment:  25y.o. year-old , postpartum day #2 s/p     Plan:   Continue routine postpartum care  Encourage ambulation  Clinically stable for discharge to home    Factor 5 Leiden mutation, heterozygous ([address])  - Continue LMWH prophylaxis. Per patient preference, will continue for 6 weeks postpartum.      _________________________________    Subjective:   Pain: Well controlled  Tolerating Oral Intake: Yes  Voiding: Yes  Ambulating: Yes  Breastfeeding: No  Chest Pain: No  Shortness of Breath: No  Leg Pain/Discomfort: No  Lochia: Normal    Objective:   Vitals:    23 1500 23 1900 23 2253 23 0700   BP: 126/92 117/78 111/73 114/84   BP Location: Right arm Right arm Right arm Right arm   Pulse: 93 88 93 82   Resp: 16 18 18 18   Temp: 98.2 °F (36.8 °C) 98 °F (36.7 °C) 98.3 °F (36.8 °C) 97.9 °F (36.6 °C)   TempSrc: Oral Oral Oral Oral   SpO2: 97%   97%   Weight:       Height:         No intake or output data in the 24 hours ending 23 1111    Physical Exam:  General: in no apparent distress, well developed and well nourished, alert, oriented times 3 and afebrile  Abdomen: abdomen is soft without significant tenderness, masses, organomegaly or guarding  Fundus: Firm and non-tender, 1 below the umbilicus  Lower extremeties: nontender    Labs/Tests:   Lab Results   Component Value Date/Time    HGB 10.6 (L) 2023 06:00 AM    HGB 12.8 2023 08:00 PM     2023 06:00 AM     2023 08:00 PM    WBC 18.76 (H) 2023 06:00 AM    WBC 15.18 (H) 2023 08:00 PM    CREATININE 0.50 (L) 2023 11:29 PM        Brief OB Lab review:  ABO Grouping   Date Value Ref Range Status   2023 O  Final      Rh Factor   Date Value Ref Range Status   2023 Positive  Final     Rh Type   Date Value Ref Range Status 03/08/2023 Positive  Final     Comment:     Please note: Prior records for this patient's ABO / Rh type are not  available for additional verification.       No results found for: "ANTIBODYSCR"  No results found for: "RUBM"    MEDS:   Current Facility-Administered Medications   Medication Dose Route Frequency   • benzocaine-menthol-lanolin-aloe (DERMOPLAST) 20-0.5 % topical spray 1 Application  1 Application Topical Z7F PRN   • calcium carbonate (TUMS) chewable tablet 1,000 mg  1,000 mg Oral Daily PRN   • diphenhydrAMINE (BENADRYL) injection 25 mg  25 mg Intravenous Q6H PRN   • docusate sodium (COLACE) capsule 100 mg  100 mg Oral BID   • enoxaparin (LOVENOX) subcutaneous injection 40 mg  40 mg Subcutaneous Daily   • hydrocortisone 1 % cream 1 Application  1 Application Topical Daily PRN   • ibuprofen (MOTRIN) tablet 600 mg  600 mg Oral Q6H PRN   • lactated ringers infusion  125 mL/hr Intravenous Continuous   • levothyroxine tablet 112 mcg  112 mcg Oral Early Morning   • ondansetron (ZOFRAN) injection 4 mg  4 mg Intravenous Q8H PRN   • oxyCODONE (ROXICODONE) IR tablet 5 mg  5 mg Oral Q3H PRN   • prenatal multivitamin tablet 1 tablet  1 tablet Oral Daily   • witch hazel-glycerin (TUCKS) topical pad 1 Pad  1 Pad Topical Q4H PRN     Invasive Devices     None                   Rich Demarco MD  8/30/2023 11:11 AM

## 2023-08-30 NOTE — PLAN OF CARE
Problem: POSTPARTUM  Goal: Experiences normal postpartum course  Description: INTERVENTIONS:  - Monitor maternal vital signs  - Assess uterine involution and lochia  Outcome: Adequate for Discharge  Goal: Appropriate maternal -  bonding  Description: INTERVENTIONS:  - Identify family support  - Assess for appropriate maternal/infant bonding   -Encourage maternal/infant bonding opportunities  - Referral to  or  as needed  Outcome: Adequate for Discharge  Goal: Establishment of infant feeding pattern  Description: INTERVENTIONS:  - Assess breast/bottle feeding  - Refer to lactation as needed  Outcome: Adequate for Discharge  Goal: Incision(s), wounds(s) or drain site(s) healing without S/S of infection  Description: INTERVENTIONS  - Assess and document dressing, incision, wound bed, drain sites and surrounding tissue  - Provide patient and family education    Outcome: Adequate for Discharge     Problem: PAIN - ADULT  Goal: Verbalizes/displays adequate comfort level or baseline comfort level  Description: Interventions:  - Encourage patient to monitor pain and request assistance  - Assess pain using appropriate pain scale  - Administer analgesics based on type and severity of pain and evaluate response  - Implement non-pharmacological measures as appropriate and evaluate response  - Consider cultural and social influences on pain and pain management  - Notify physician/advanced practitioner if interventions unsuccessful or patient reports new pain  Outcome: Adequate for Discharge     Problem: INFECTION - ADULT  Goal: Absence or prevention of progression during hospitalization  Description: INTERVENTIONS:  - Assess and monitor for signs and symptoms of infection  - Monitor lab/diagnostic results  - Monitor all insertion sites, i.e. indwelling lines, tubes, and drains  - Monitor endotracheal if appropriate and nasal secretions for changes in amount and color  - [place] appropriate cooling/warming therapies per order  - Administer medications as ordered  - Instruct and encourage patient and family to use good hand hygiene technique  - Identify and instruct in appropriate isolation precautions for identified infection/condition  Outcome: Adequate for Discharge  Goal: Absence of fever/infection during neutropenic period  Description: INTERVENTIONS:  - Monitor WBC    Outcome: Adequate for Discharge     Problem: SAFETY ADULT  Goal: Patient will remain free of falls  Description: INTERVENTIONS:  - Educate patient/family on patient safety including physical limitations  - Instruct patient to call for assistance with activity   - Consult OT/PT to assist with strengthening/mobility   - Keep Call bell within reach  - Keep bed low and locked with side rails adjusted as appropriate  - Keep care items and personal belongings within reach  - Initiate and maintain comfort rounds  - Make Fall Risk Sign visible to staff    - Apply yellow socks and bracelet for high fall risk patients  - Consider moving patient to room near nurses station  Outcome: Adequate for Discharge  Goal: Maintain or return to baseline ADL function  Description: INTERVENTIONS:  -  Assess patient's ability to carry out ADLs; assess patient's baseline for ADL function and identify physical deficits which impact ability to perform ADLs (bathing, care of mouth/teeth, toileting, grooming, dressing, etc.)  - Assess/evaluate cause of self-care deficits   - Assess range of motion  - Assess patient's mobility; develop plan if impaired  - Assess patient's need for assistive devices and provide as appropriate  - Encourage maximum independence but intervene and supervise when necessary  - Involve family in performance of ADLs  - Assess for home care needs following discharge   - Consider OT consult to assist with ADL evaluation and planning for discharge  - Provide patient education as appropriate  Outcome: Adequate for Discharge  Goal: Maintains/Returns to pre admission functional level  Description: INTERVENTIONS:  - Perform BMAT or MOVE assessment daily.   - Set and communicate daily mobility goal to care team and patient/family/caregiver. - Collaborate with rehabilitation services on mobility goals if consulted    - Record patient progress and toleration of activity level   Outcome: Adequate for Discharge     Problem: Knowledge Deficit  Goal: Patient/family/caregiver demonstrates understanding of disease process, treatment plan, medications, and discharge instructions  Description: Complete learning assessment and assess knowledge base.   Interventions:  - Provide teaching at level of understanding  - Provide teaching via preferred learning methods  Outcome: Adequate for Discharge     Problem: DISCHARGE PLANNING  Goal: Discharge to home or other facility with appropriate resources  Description: INTERVENTIONS:  - Identify barriers to discharge w/patient and caregiver  - Arrange for needed discharge resources and transportation as appropriate  - Identify discharge learning needs (meds, wound care, etc.)  - Arrange for interpretive services to assist at discharge as needed  - Refer to Case Management Department for coordinating discharge planning if the patient needs post-hospital services based on physician/advanced practitioner order or complex needs related to functional status, cognitive ability, or social support system  Outcome: Adequate for Discharge

## 2023-08-30 NOTE — PROGRESS NOTES
TERRA notified that patient delivered baby and was discharging home today. Due to this, patient did not present to appointment yesterday or drop off her medical bills as discussed on Monday. TERRA checked status of MA application - [facility] sent request for more information including:     SSN proof (card), [place] form 1898 (Employment Termination), copy of final paystub, unpaid medical bills and income from when patient accrued these bills, proof of Unemployment Compensation (UC) application, and UC income. TERRA did previously upload patient's SSN card, a termination letter from her previous employer, and the letter patient received from  noting her eligibility and benefit amount. TERRA re-uploaded these documents and sent an e-mail to patient requesting additional documents ([employer] form from employer, unpaid bills and income from these months). All of these documents must be provided to [facility] by 09/12/23. TERRA informed patient via e-mail of same. TERRA will attempt to reach back out to patient via phone later this week. Addendum-     Patient called back - explained birth story. TERRA congratulated patient on birth of her daughter. Luckily, patient is still covered by her previous employer's insurance through tomorrow. TERRA explained documents needed and stated they are also listed in the e-mail TERRA sent. Patient stated she had trouble opening encrypted e-mail and asked SW to resend. TERRA sent again - patient stated she will try again, and if she cannot open it, will attempt to read it once she gets home. SW to touch base with patient next week regarding needed documents.

## 2023-08-30 NOTE — LACTATION NOTE
This note was copied from a baby's chart. Met with mother to follow up from yesterday's encounter. Mother discussed that baby has not latched and has been pumping. During the night she fell asleep and missed a few sessions, but was given reassure to begin anew with getting most pump sessions in during the day and a least once a night (minimum 8 times). Mother used manual breast pump prior to encounter, getting 15 ml and was assisted in setting up personal breast pump (FreeLunched) and with cycling. A "hands off" bra was made using a monitor band and mother pumped around 1 ml more. She was encouraged to use breast compression, before, during and/or afterward to help express more milk during sessions. Discussed how to set up a pump and how to cycle (stimulation vs expression phases during a pumping session), reviewing cleaning parts. Baby fed 15 ml paced bottle feeding and mother was given a pumping log as well as a handout on "Increasing the Milk Supply" to supplement education provided. Discussed with mother continued support in breastfeeding once discharged home with [address], but insurance lapses tomorrow. Once her Medicaid is active she will call for follow up and will continue pumping to feed if baby does not latch. Mother was encouraged to call for assistance and/or for further questions that arise.

## 2023-08-30 NOTE — TELEPHONE ENCOUNTER
Patient is being discharged to home today from [address] [place] [address] following  on 2023. Her hospital course was notable for precipitous  delivery at 35w0d in hospital lobby upon arrival. She should be scheduled for routine postpartum follow up in office.     Reese Carter MD  2023 11:23 AM

## 2023-08-30 NOTE — DISCHARGE SUMMARY
Discharge Summary - OB/GYN   [facility] 25 y.o. female MRN: [id number]  Unit/Bed#: -01 Encounter: [id number]    Admission Date: 2023     Discharge Date: 23    Principal Diagnosis:  labor with delivery, [id number] Pregnancy at 35w0d    Secondary Diagnosis: n/a    Attending - Delivery: Madan Goyal MD         - Discharge: Kenya Alonzo MD    Procedures: Vaginal, Spontaneous , 1st degree laceration    Anesthesia: none    Complications: none apparent    Hospital Course:      [facility] is a 25 y.o. [id number] at 35w0d who was admitted with  labor. Patient precipitously delivered in the hospital lobby upon arrival.    She delivered a viable  with weight of 2790 grams, apgars of 8 (1 min) and 9 (5 min).  was transferred to  nursery. Patient tolerated the procedure well and was transferred to recovery in stable condition. Her postpartum course was uncomplicated. Admission hemoglobin was 12.8, postpartum was 10.6. On day of discharge, she was ambulating and able to reasonably perform all ADLs. She was voiding and had appropriate bowel function. Pain was well controlled. She was discharged home on postpartum day #2 without complications. Patient was instructed to follow up with her OB as an outpatient and was given appropriate warnings to call provider if she develops signs of infection or uncontrolled pain. Condition at discharge: good     Discharge instructions/Information to patient and family:   See after visit summary for information provided to patient and family. Provisions for Follow-Up Care:  See after visit summary for information related to follow-up care and any pertinent home health orders. Disposition: See After Visit Summary for discharge disposition information. Planned Readmission: No    Discharge Medications:   For a complete list of the patient's medications, please refer to her med rec.    Howie Liz MD  8/30/2023 11:20 AM

## 2023-08-30 NOTE — PLAN OF CARE

## 2023-09-02 NOTE — L&D DELIVERY NOTE
Vaginal Delivery Summary - OB/GYN    [facility] 25 y.o. female MRN: [id number]  Unit/Bed#: -01 Encounter: [id number]          Predelivery Diagnosis:  1. Pregnancy at 35 weeks    Postdelivery Diagnosis:  1. Same as above  2. Delivery of  viable     Procedure: Spontaneous Vaginal Delivery, repair of no laceration    Attending: Missy    Assistant: none    Anesthesia: none      Complications: none apparent    Specimens: cord blood, arterial and venous cord blood gasses, placenta to storage    Findings:   1. Viable female at 46, with APGARS of 8 and 9 at 1 and 5 minutes respectively,  2. Spontaneous delivery of intact placenta at 1955  3. Superficial labial laceration not requiring repair    Umbilical Cord Venous Blood Gas: none      Umbilical Cord Arterial Blood Gas: none        Disposition:  Patient tolerated the procedure well and was recovering in labor and delivery room     Brief history and labor course:  Ms. ROGERS [facility] is a 25 y.o.   at 35 weeks. She presented to labor and delivery having just delivered baby on way to the floor from [facility], accompanied only by , staff unaware of pt arrival prior to arrival at our . Description of procedure    After pushing for no minutes (per pt) , at 1750 patient delivered a viable female , wt pending, apgars of 8 (1 min) and 9 (5 min). The fetal vertex delivered spontaneously. Baby was checked for nuchal. . The anterior shoulder delivered atraumatically with maternal expulsive forces and the assistance of downward traction. The posterior shoulder delivered with maternal expulsive forces and the assistance of upward traction. The remainder of the fetus delivered spontaneously. Upon delivery, the infant was placed on the maternal abdomen and delayed cord clamping was performed. The umbilical cord was then doubly clamped and cut.   The infant was noted to cry spontaneously and was moving all extremities appropriately. Arterial and venous cord blood gases and cord blood was collected for analysis. These were promptly sent to the lab. In the immediate post-partum, 30 units of IV pitocin was administered, and the uterus was noted to contract down well with massage and pitocin. The placenta delivered spontaneously  and was noted to have a centrally inserted 3 vessel cord. The vagina, cervix, perineum, and rectum were inspected and there was noted to be a superficial labial tear not requiring repair. At the conclusion of the procedure, all needle, sponge, and instrument counts were noted to be correct. Patient tolerated the procedure well and was allowed to recover in labor and delivery room with family and  before being transferred to the post-partum floor.        Erik Burr DO  [facility]

## 2023-09-05 ENCOUNTER — PATIENT OUTREACH (OUTPATIENT)
Dept: OBGYN CLINIC | Facility: CLINIC | Age: 28
End: 2023-09-05

## 2023-09-05 NOTE — PROGRESS NOTES
SW received e-mail from patient stating she had submitted the requested termination form to her employer. SW replied stating that the form was not complete - SW sent updated form from [facility] site with her case number and information filled out. TERRA attempted to call [facility] HelpLine to request a full explanation of specific documentation needed to supplement patient's application - TERRA was on hold for over 20 minutes and could not reach a representative. SW to try again at a later time. TERRA then called patient at her request to go over document list. Patient stated she will have all requested documents in order for TERRA tomorrow. She did get previous termination form completed already, but stated she will send over the updated one shortly. No other needs identified, SW to touch base with patient later this week after receiving requested documentation.

## 2023-09-06 ENCOUNTER — PATIENT OUTREACH (OUTPATIENT)
Dept: OBGYN CLINIC | Facility: CLINIC | Age: 28
End: 2023-09-06

## 2023-09-06 NOTE — PROGRESS NOTES
Patient provided documents requested by [facility]/[employer] to TERRA via e-mail. SW uploaded documents to [employer] site. TERRA also called [facility] Help Line to speak with a  regarding documentation requested as proof of Unemployment Compensation application/income (SW previously uploaded this - unclear if not received or not correct documentation). After 40 minute wait time, representative answered but stated she could not hear TERRA - SW attempted several technical fixes but none worked. As representative could not hear TERRA, representative requested SW call the line back. Due to schedule, TERRA unable to call again today - TERRA till try again tomorrow.

## 2023-09-07 ENCOUNTER — PATIENT OUTREACH (OUTPATIENT)
Dept: OBGYN CLINIC | Facility: CLINIC | Age: 28
End: 2023-09-07

## 2023-09-07 LAB — PLACENTA IN STORAGE: NORMAL

## 2023-09-08 ENCOUNTER — PATIENT OUTREACH (OUTPATIENT)
Dept: OBGYN CLINIC | Facility: CLINIC | Age: 28
End: 2023-09-08

## 2023-09-08 NOTE — PROGRESS NOTES
Patient called TERRA stating she received a call today from [facility] stating they needed a copy of FOB's most recent paycheck for baby's MA application. Unfortunately, FOIRAIDA is paid weekly and his most recent paycheck is not an accurate reflection of his income as he was off for a few days due to delivery. TERRA encouraged patient/FOB to obtain his paystubs for the last 30 days to upload into [employer] so it is clear what his average income is. Patient stated she would get these to TERRA by Monday as Tuesday is the documentation submission deadline.

## 2023-09-11 ENCOUNTER — PATIENT OUTREACH (OUTPATIENT)
Dept: OBGYN CLINIC | Facility: CLINIC | Age: 28
End: 2023-09-11

## 2023-09-11 NOTE — PROGRESS NOTES
SW received e-mail from patient with KAYLA Jaffe's paystubs for baby Patricia's MA application. SW uploaded these to patient's application in [employer] as requested by the [facility]. SW specified in upload that these paystubs are for baby's application, NOT patient's as they are not legally  and FOB's income should not effect patient's eligibility (based on income). SW replied to patient's e-mail to let her know that the documents were successfully uploaded and that SW would keep her updated on status of application in [employer] - deadline to upload all documentation is 09/12/23.

## 2023-09-12 ENCOUNTER — OFFICE VISIT (OUTPATIENT)
Dept: ENDOCRINOLOGY | Facility: HOSPITAL | Age: 28
End: 2023-09-12
Payer: COMMERCIAL

## 2023-09-12 VITALS
DIASTOLIC BLOOD PRESSURE: 68 MMHG | HEART RATE: 73 BPM | WEIGHT: 173.6 LBS | BODY MASS INDEX: 30.76 KG/M2 | HEIGHT: 63 IN | SYSTOLIC BLOOD PRESSURE: 110 MMHG | OXYGEN SATURATION: 97 %

## 2023-09-12 DIAGNOSIS — E28.2 POLYCYSTIC OVARIAN SYNDROME: Primary | ICD-10-CM

## 2023-09-12 DIAGNOSIS — E03.9 ACQUIRED HYPOTHYROIDISM: ICD-10-CM

## 2023-09-12 DIAGNOSIS — E04.2 MULTINODULAR THYROID: ICD-10-CM

## 2023-09-12 PROCEDURE — 99214 OFFICE O/P EST MOD 30 MIN: CPT | Performed by: STUDENT IN AN ORGANIZED HEALTH CARE EDUCATION/TRAINING PROGRAM

## 2023-09-12 NOTE — PROGRESS NOTES
Established Patient Progress Note       Chief Complaint   Patient presents with   • Hypothyroidism   • Polycystic Ovary Syndrome      History of Present Illness:     Yasir Henry is a 25 y.o. female with a history of subclinical hypothyroidism on levothyroxine, multinodular thyroid s/p FNA of 0.9cm right thyroid nodule in 08/2019 with neg results, repeat US 04/23 showed 2.6cm TR4 nodule b/l s/p FNA 05/23 right nodule- neg FNA, left nodule was bethesda 1- non diagnostic. PCOS since age 19 who presents today for post partum hypothyroidism/PCOS management. Michael Norwood delivered a beautiful baby Kasia Batista, born 5 weeks earlier. Has been doing well. And thriving. Subclinical hypothyroidism:- Dx with thyroid issue in 2017, Started on 25mcg levothyroxine. Dose inc during pregnancy to 112mcg. Last TSH 07/23 while pregnant was euthyroid. No recent labs on file. Reports has been feeling well, does feel some tiredness but drinks coffee and managable. Denies any constipation, dry skin, brittle nails. Menses has not returned. Has been breast feeding and formula feeding. Dx of Non toxic Multinodular goiter:- Diagnosed based on exam-US done in 2018/2019, had FNA done twice as well but reports in file only for 1 right sided nodule 0.9cm in side- neg for malignancy in 2019. Repeat US 2023 showed growth in b/l nodules 2.6cm TR4 and therefore underwent FNA for both on 05/23. Right nodule benign but left sample was non diagnostic. Hx of radiation to neck, family hx of thyroid cancer. Repeat US ordered, last US 03/23     Dx PCOS- age 19. Previously on OCP for menstral regulation, taken off this since was dx with factor 5 leiden def, started on IUD instead,  first pregnancy- naturally conceived. Previously on metformin but dx during pregnancy d/t normal BG. Never been on aldactone in past. Has some hair growth on face but manageable. Plucks often but doesn't shave.   Has not had previous workup for PCOS mimickers.      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 Does not drink alcohol, no smoking anymore, no other drugs   Fhx:   thyroid disorder- Mother had thyroid issues. Possible PCOS in mother. Patient Active Problem List   Diagnosis   • Anxiety   • Recurrent major depressive disorder, in partial remission (HCC)   • Acquired hypothyroidism   • Polycystic ovarian syndrome   • Herpes simplex infection of genitourinary system   • Human papilloma virus   • History of cervical LEEP biopsy affecting care of mother, antepartum, second trimester   • Thyroid disease during pregnancy in third trimester   • 35 weeks gestation of pregnancy   • Genital herpes simplex virus (HSV) infection in mother affecting pregnancy   • Multinodular thyroid   •  screening encounter   • Acute vaginitis   • Factor 5 Leiden mutation, heterozygous ([address])   • Gastroesophageal reflux in pregnancy   • Encounter for other specified  screening   • Normal labor and delivery   •  delivery      Past Medical History:   Diagnosis Date   • Anxiety and depression     previously on Cymbalta, stopped before 2022 due to cost of visits. Currently no medication and self-manage. • Factor 5 Leiden mutation, heterozygous [facility])    • Genital herpes     Last out break per pt months ago (23). On Valtrex only w/outbreak. • History of loop electrical excision procedure (LEEP) 2019   • Human papilloma virus    • Hypothyroid     Currently on Levothyroxine 25mg and in the process of finding a new endocrinologist. Does not remember when last TSH check. • Miscarriage 22    This was my first pregnancy. The miscarriage started Friday, 22   • PCOS (polycystic ovarian syndrome)     Currently on metformin 500 mg x2 daily.       Past Surgical History:   Procedure Laterality Date   • CERVICAL BIOPSY  W/ LOOP ELECTRODE EXCISION     • US GUIDED THYROID BIOPSY  2023   • US GUIDED THYROID BIOPSY  2019   • WISDOM TOOTH EXTRACTION Family History   Problem Relation Age of Onset   • Thyroid disease Mother    • Hypothyroidism Mother    • Depression Father    • Cancer Father         Multiple Myloma   • Anxiety disorder Father    • Deep vein thrombosis Father      Social History     Tobacco Use   • Smoking status: Former     Packs/day: 0.25     Years: 2.00     Total pack years: 0.50     Types: Cigarettes     Passive exposure: Never   • Smokeless tobacco: Never   • Tobacco comments:     As soon as I found out that I was pregnant, I completely stopped smoking. Substance Use Topics   • Alcohol use: Not Currently     Comment: I rarely drink     No Known Allergies    Current Outpatient Medications:   •  docusate sodium (COLACE) 100 mg capsule, Take 1 capsule (100 mg total) by mouth 2 (two) times a day as needed for constipation, Disp: , Rfl: 0  •  enoxaparin (LOVENOX) 40 mg/0.4 mL, Inject 0.4 mL (40 mg total) under the skin in the morning Do not start before August 31, 2023., Disp: 16.8 mL, Rfl: 0  •  ibuprofen (MOTRIN) 200 mg tablet, Take 1 tablet (200 mg total) by mouth every 6 (six) hours as needed for mild pain, Disp: , Rfl:   •  levothyroxine 112 mcg tablet, TAKE 1 TABLET BY MOUTH EVERY DAY, Disp: 30 tablet, Rfl: 3  •  Magnesium 400 MG CAPS, Take 400 mg by mouth daily at bedtime, Disp: , Rfl:   •  Prenatal Vit-Fe Fumarate-FA (PRENATAL VITAMINS PO), Take by mouth, Disp: , Rfl:   •  valACYclovir (VALTREX) 500 mg tablet, if needed Takes when needed, Disp: , Rfl:   •  witch hazel-glycerin (TUCKS) topical pad, Apply 1 Pad topically every 4 (four) hours as needed for irritation, Disp: , Rfl: 0  •  sucralfate (CARAFATE) 1 g tablet, TAKE 1 TABLET BY MOUTH EVERY 6 HOURS AS NEEDED FOR ACID REFLUX (Patient not taking: Reported on 9/12/2023), Disp: , Rfl:     Review of Systems   Constitutional: Negative for fatigue and unexpected weight change. HENT: Negative for trouble swallowing and voice change.     Eyes: Negative for photophobia and visual disturbance. Gastrointestinal: Negative for abdominal pain, constipation, diarrhea and nausea. Endocrine: Negative for cold intolerance and heat intolerance. Genitourinary: Negative. Musculoskeletal: Negative. Psychiatric/Behavioral: Negative. Physical Exam:  Body mass index is 30.75 kg/m². /68   Pulse 73   Ht 5' 3" (1.6 m)   Wt 78.7 kg (173 lb 9.6 oz)   LMP 12/26/2022 (Exact Date)   SpO2 97%   Breastfeeding No Comment: pt is pumping  BMI 30.75 kg/m²    Wt Readings from Last 3 Encounters:   09/12/23 78.7 kg (173 lb 9.6 oz)   08/28/23 86.4 kg (190 lb 6.4 oz)   08/14/23 86.4 kg (190 lb 6.4 oz)       Physical Exam  Constitutional:       Appearance: Normal appearance. She is obese. Neck:      Comments: Diffusely enlarged thyroid   Cardiovascular:      Rate and Rhythm: Normal rate and regular rhythm. Pulses: Normal pulses. Pulmonary:      Effort: Pulmonary effort is normal.   Abdominal:      General: Bowel sounds are normal.      Palpations: Abdomen is soft. Comments: Gravid   Skin:     General: Skin is warm and dry. Capillary Refill: Capillary refill takes less than 2 seconds. Neurological:      General: No focal deficit present. Mental Status: She is alert and oriented to person, place, and time.    Psychiatric:         Mood and Affect: Mood normal.         Labs:    Latest Reference Range & Units 03/28/23 07:07 04/27/23 07:22 07/07/23 07:14   TSH, POC 0.450 - 4.500 uIU/mL 5.670 (H) 2.390 2.550   Free T4 0.82 - 1.77 ng/dL  1.49 1.30   (H): Data is abnormally high    Radiology-     THYROID ULTRASOUND     INDICATION:    E04.1: Nontoxic single thyroid nodule.     COMPARISON:  None available; report of outside thyroid ultrasound 1/24/2019 and biopsy 8/9/2019     TECHNIQUE:   Ultrasound of the thyroid was performed with a high frequency linear transducer in transverse and sagittal planes including volumetric imaging sweeps as well as traditional still imaging technique.     FINDINGS:  Thyroid parenchyma is diffusely heterogeneous in echotexture.     Right lobe: 5.1 x 2.1 x 1.5 cm. Volume 7.8 mL  Left lobe:  4.0 x 1.6 x 1.0 cm. Volume 2.9 mL  Isthmus: 0.3  cm.     Nodule #1. Image 7. RIGHT midgland nodule measuring 2.6 x 0.9 x 1.2 cm. COMPOSITION:  2 points, solid or almost completely solid . ECHOGENICITY:  2 points, hypoechoic. SHAPE:  0 points, wider-than-tall. MARGIN: 0 points, smooth. ECHOGENIC FOCI:  0 points, none or large comet-tail artifacts. TI-RADS Classification: TR 4 (4-6 points),  FNA if > 1.5 cm. Follow if > 1cm. This nodule has had a previous benign biopsy.       Nodule #2. Image 35. LEFT midgland nodule measuring 2.6 x 0.7 x 1.0 cm. COMPOSITION:  2 points, solid or almost completely solid . ECHOGENICITY:  2 points, hypoechoic. SHAPE:  0 points, wider-than-tall. MARGIN: 0 points, smooth. ECHOGENIC FOCI:  0 points, none or large comet-tail artifacts. TI-RADS Classification: TR 4 (4-6 points), FNA if > 1.5 cm. Follow if > 1cm.     IMPRESSION:     The left midgland nodule meets current ACR criteria for recommending ultrasound guided biopsy.      Right midgland nodule has prior benign biopsy. Recommend follow up in 12-24 months for this nodule.     Reference: ACR Thyroid Imaging, Reporting and Data System (TI-RADS): White Paper of the [employer]. J AM Kurtis Radiol 7493;78:439-565. (additional recommendations based on American Thyroid Association 2015 guidelines.)     05/23  The study was marked in [facility] for significant notification.     Case Report   Non-gynecologic Cytology                          Case: [id number]                                   Authorizing Provider: River Joaquin MD            Collected:           05/02/2023                    Ordering Location:     [facility]     Received:            05/02/2023 2135                                      [facility]                                                             Pathologist:           Braulio More MD                                                                  Specimens:   A) - Thyroid, Right, Mid Pole                                                                        B) - Thyroid, Right, Mid Pole                                                                        C) - Thyroid, Left, Mid Pole                                                                         D) - Thyroid, Left, Mid Pole                                                               Final Diagnosis   A.B. Thyroid, Right, Mid Pole ( ThinPrep and smear preparations ):  Benign (Hershey Category II) - See note.     Satisfactory for evaluation.     Note: As reported in the [address] [place] [address] for Reporting Thyroid Cytopathology*, the diagnostic category of "benign/negative for malignancy" carries a 0-3% risk of malignancy being found in subsequent resections (in the few studies of patients with benign FNA results that were followed long-term, a false negative rate of 0-3% was reported), with the usual management being clinical follow-up supplemented by ultrasonography (US) as indicated. Repeat FNA may be indicated for nodules showing significant growth or developing US abnormalities. Ultimately, clinical/imaging correlation for this patient is needed in arriving at the actual management plan.     *The [place]hey System for Reporting Thyroid Cytopathology, Tobi Oconnell, Rita Bey.), 2018 (2nd ed.)        C. D. Thyroid, Left, Mid Pole ( ThinPrep and smear preparations ):  Non-diagnostic (Hershey Category I) - See note. - Other (obscuring blood, clotting artifact) - see note. Few groups of follicular cells. Those present partially obscured by blood.     Note: Specimen processed and examined, but nondiagnostic due to insufficient cellularity.  A repeat aspiration should be considered if clinically indicated.     Note: As reported in the [facility] for Reporting Thyroid Cytopathology*, the diagnostic category of "non-diagnostic" carries a 5-10% risk of malignancy being found in subsequent resections (Non-diagnostic aspirates from solid nodules are associated with a higher risk of malignancy as compared to those showing >/= 50% cystic change and low-risk ultrasonographic features), with the usual management being repeat FNA with ultrasound guidance. Ultimately, clinical/imaging correlation for this patient is needed in arriving at the actual management plan.     *The [place] System for Reporting Thyroid Cytopathology, Germán Tomlinson Patient Mayra Lewis.), 2018 (2nd ed.)   Electronically signed by Cl Aguila MD on 5/4/2023 at  2:11 PM   Note    The treating physician has requested a sample(s) from the above thyroid nodule(s) be sent for analysis by  [facility] Gene Expression  (Afirma GEC), performed by Jenniffer Rossi. [facility] only performs this test on specimen(s) receiving a cytologic diagnosis of [place] Category III or  IV. If such a diagnosis is rendered (above) specimen will be sent to THE [facility] [place], and a separate report with  results of Afirma GEC will follow directly from [facility] (typically taking 14 days). If a cytologic  interpretation other than Calmar category III or IV is rendered, specimen will not be sent for Afirma GEC.    Intraoperative Consultation    Thyroid, Right Mid Pole FNA On-Site Evaluation:      Adequate.      Dr. Kaitlin Crow  Interpretation performed at [facility], [id number] [place], [place] [address]  Thyroid, Left Mid Pole FNA On-Site Evaluation:      Requested additional passes.      Dr. Kaitlin Crow  Interpretation performed at [facility], [address] [address]   Gross Description    A. 20 mL of pale pink, clear fluid, received in CytoLyt   B. 6 slides received (3 Alcohol-Fixed, 3 Diff-Quik)   C. 20 mL of pink, hazy fluid, received in CytoLyt   D. 16 slides received (8 Alcohol-Fixed, 8 Diff-Quik)      2018     Addendum by oRse Crum MD on 2019 5:16 PM EDT  THE PREVIOUS REPORT AND SUBSEQUENT ADDENDUM REPORT DICTATED BY DR. Boni Reed    ADDENDUM: Cytology results as follows: Benign follicular nodule [[place]   category 2].         Imaging Results - US THYROID NEEDLE ASP (2019 1:34 PM EDT)  Impressions   2019 2:14 PM EDT    Successful ultrasound guided fine needle biopsy of nodule/s in the thyroid gland. The patient is planning on calling their physician in several days to learn the pathology results.        Imaging Results - US THYROID NEEDLE ASP (2019 1:34 PM EDT)  Narrative   2019 2:14 PM EDT    YAMILET LópezA UI: [id number] : 1995 F    [id number]    ULTRASOUND GUIDED THYROID BIOPSY    CLINICAL INDICATION: Abnormal thyroid. COMMENT: The patient's recently identified nodule in the right lobe of the thyroid gland was localized. Nodule measures 0.9 x 0.9 x 0.7 cm. Following informed consent, cleansing and topical anesthesia of the skin, 5 separate 25-gauge fine needle  aspirates were performed under ultrasound guidance. All samples were given to pathology for cytology and were placed in tubes for Afirma study as needed. Material obtained was reviewed immediately by pathology and it was felt adequate samples had been obtained. The patient tolerated the procedure without complication and left the department in good condition with instructions as to how to proceed should  complications develop.      2019  echnique: A sonogram of the thyroid gland was performed assessing   gray-scale appearance and color doppler flow. DISCUSSION: Comparison in May with prior thyroid ultrasound dated   2018. The right thyroid lobe measures 4.7 x 1.7 x 1.3 cm.  The left thyroid   lobe measures 4.1 x 1.4 x 1.1 cm. Isthmus measures 4mm in maximum AP   diameter. There is a hypoechoic irregular marginated 1.2 cm mass in   the right superior thyroid previously measuring 1 cm. Given the   irregular margins as well as interval increase in size, this is mildly   suspicious in nature and biopsy is recommended. There is also a stable   1.3 cm hypoechoic well marginated mass in the left superior thyroid. .     No adjacent enlarged lymph nodes are seen. IMPRESSION:   1. Mildly suspicious 1.2 cm irregularly marginated hypoechoic nodule   in the right superior thyroid for which ultrasound guided FNA is   recommended. 2. Stable 1.3 cm well marginated left superior thyroid nodule. SRU CONSENSUS PANEL RECOMMENDATIONS FOR THE BIOPSY OF THYROID NODULES:     1. Solid of 1cm or larger that have microcalcifications   2. Solid or nodules with coarse calcifications that are 1.5 cm or   larger   3. Mixed solid and cystic or almost entirely cystic with solid mural   component of 2cm or larger   4. Growing nodules   5.  Suspicious adenopathy overrides these nodule features and should   prompt FNA of either the lymph node or the thyroid nodule.      Impression & Plan:    Problem List Items Addressed This Visit        Endocrine    Acquired hypothyroidism    Relevant Orders    T4, free    TSH, 3rd generation    Hemoglobin A1C    17-Hydroxyprogesterone- Lab Collect    Testosterone, free, total    Luteinizing hormone Lab Collect    Follicle stimulating hormone Lab Collect    Lipid panel- Lab Collect    Hemoglobin A1C    Polycystic ovarian syndrome - Primary    Relevant Orders    T4, free    TSH, 3rd generation    Hemoglobin A1C    17-Hydroxyprogesterone- Lab Collect    Testosterone, free, total    Luteinizing hormone Lab Collect    Follicle stimulating hormone Lab Collect    Lipid panel- Lab Collect    Hemoglobin A1C    Multinodular thyroid    Relevant Orders    T4, free    TSH, 3rd generation    Hemoglobin A1C    17-Hydroxyprogesterone- Lab Collect    Testosterone, free, total    Luteinizing hormone Lab Collect    Follicle stimulating hormone Lab Collect    Lipid panel- Lab Collect    Hemoglobin A1C       Orders Placed This Encounter   Procedures   • T4, free     Standing Status:   Future     Number of Occurrences:   1     Standing Expiration Date:   9/12/2024   • TSH, 3rd generation     This is a patient instruction: This test is non-fasting. Please drink two glasses of water morning of bloodwork. Standing Status:   Future     Number of Occurrences:   1     Standing Expiration Date:   9/12/2024   • Hemoglobin A1C     Standing Status:   Future     Number of Occurrences:   1     Standing Expiration Date:   9/12/2024   • 17-Hydroxyprogesterone- Lab Collect     Standing Status:   Future     Number of Occurrences:   1     Standing Expiration Date:   9/12/2024   • Testosterone, free, total     This is a patient instruction: Fasting preferred. Collections for men not undergoing treatment must be completed between 7am-9am ONLY. Collection time restrictions are not applicable to women or men already undergoing treatment. Standing Status:   Future     Number of Occurrences:   1     Standing Expiration Date:   9/12/2024   • Luteinizing hormone Lab Collect     Standing Status:   Future     Number of Occurrences:   1     Standing Expiration Date:   3/63/2918   • Follicle stimulating hormone Lab Collect     Standing Status:   Future     Number of Occurrences:   1     Standing Expiration Date:   9/12/2024   • Lipid panel- Lab Collect     This is a patient instruction: This test requires patient fasting for 10-12 hours or longer. Drinking of black coffee or black tea is acceptable.      Standing Status:   Future     Number of Occurrences:   1     Standing Expiration Date:   9/12/2024   • Hemoglobin A1C     Standing Status:   Future     Number of Occurrences:   1     Standing Expiration Date:   9/12/2024       There are no Patient Instructions on file for this visit. 1. Subclinical Hypothyroidism, likely due to Hashimoto:- Currently post partum now. We do not have a recent TSH, dose of levothyroxine increases during pregnancy to optimize her. Now post partum and hence ok to reduce dose down from 112mcg to 100mcg, repeat TFT in 6 weeks. Adjust dose to aim for normal TSH 2-3 range. Prior to pregnancy on 25mcg      2. Multinodular thyroid- Non toxic, s/p FNA in 2019 of right thyroid nodule- neg FNA and s/p FNA right/Left 2.6cm TR4 nodules 05/23, Right nodule benign but left was non diagnostic. Recommend repeat US in Nov 2023- ordered already     3) PCOS:- Dx in 2014, given irregular menses and hirsutism- most likely, but unsure if PCOS mimickers ruled out, Will check 17-hydroxy, Testosterone, LH, FSH now. Cannot check prolactin since post partum. Will check HbA1C and lipid now. Menses- has not returned yet but is also breast feeding. Planning on getting IUD soon  Hyperandrogenism- mild symptoms, does not want to consider aldactone at this time as symptoms are manageable     RTC in 3 months    Discussed with the patient and all questioned fully answered. She will call me if any problems arise.     Counseled patient on diagnostic results, prognosis, risk and benefit of treatment options, instruction for management, importance of treatment compliance, Risk  factor reduction and impressions    Andrea Woodruff MD

## 2023-09-14 ENCOUNTER — TELEPHONE (OUTPATIENT)
Dept: POSTPARTUM | Facility: CLINIC | Age: 28
End: 2023-09-14
Payer: COMMERCIAL

## 2023-09-14 PROCEDURE — 99202 OFFICE O/P NEW SF 15 MIN: CPT | Performed by: PEDIATRICS

## 2023-09-14 NOTE — TELEPHONE ENCOUNTER
Spoke with Mile Hurley - she is attempting to nurse Siri Fagan (2w) she is not latching well, Mile Hurley started pumping. But noticed last Friday into Saturday that her supply has drastically dropped & they have now had to start supplementing with formula. Mile Hurley prefers a phone consult due to distance she is coming from. If she needs an in office visit, she will think about the CV office.

## 2023-09-14 NOTE — TELEPHONE ENCOUNTER
Virtual Brief Visit    This Visit is being completed by telephone. The Patient is located at Home and in the following state in which I hold an active license [place]    The patient was identified by name and date of birth. Carla Nelson was informed that this is a telemedicine visit and that the visit is being conducted through Telephone. My office door was closed. No one else was in the room. She acknowledged consent and understanding of privacy and security of the video platform. The patient has agreed to participate and understands they can discontinue the visit at any time. Patient is aware this is a billable service. Assessment/Plan:    Problem List Items Addressed This Visit    None      Recent Visits  No visits were found meeting these conditions. Showing recent visits within past 7 days and meeting all other requirements  Today's Visits  Date Type Provider Dept   09/14/23 Telephone Chas Rosas [facility]   Showing today's visits and meeting all other requirements  Future Appointments  No visits were found meeting these conditions. Showing future appointments within next 150 days and meeting all other requirements     Whit Mcintosh has had trouble latching since birth. She was born at 27 weeks. Deven Cervantes has been pumping and when her milk came in, was able to express more than enough milk to meet Patricia's needs. In the last week, she saw a significant drop in her production and is now needing to supplement with formula. She is unsure why production dropped. Deven Cervantes is using a Medela PIS with Max Flow. She is pumping twice a day since the beginning. Since yesterday, she has pumped 3-4 times. Initially, she was expressing 18 ounces of milk a day. Now, when she pumps, she expresses about 45ml each session. She is taking levothyroxine 112mcg, lovenox, PNV, acetaminophen. I reviewed with Deven Cervantes the demand = supply concept of milk production.   I encouraged her to pump more frequently (ideally 6-8 times a day at least for now) if she desires to reach full production again. She reported that her flanges appear to fit well and are comfortable and she understands effective use of her pump. I encouraged her to call for more support as desired.     Visit Time  Total Visit Duration: 15 minutes

## 2023-09-15 ENCOUNTER — PATIENT OUTREACH (OUTPATIENT)
Dept: OBGYN CLINIC | Facility: CLINIC | Age: 28
End: 2023-09-15

## 2023-09-15 NOTE — PROGRESS NOTES
TERRA has checked [employer] web site throughout the week to verify status of Medicaid application. Currently, patient's application is still in process. SW sent patient an e-mail informing her of same. TERRA to check status again next week. [employer] has up to 30 days to process application - initial submission date was 08/21/23.

## 2023-09-19 ENCOUNTER — PATIENT OUTREACH (OUTPATIENT)
Dept: OBGYN CLINIC | Facility: CLINIC | Age: 28
End: 2023-09-19

## 2023-09-19 NOTE — PROGRESS NOTES
Patient called and left a VM stating she received an automated message from [facility] [place] informing her to check her mail for an incoming packet. Patient inquired if this was because she was approved for MA as the message did not outright state this. SW checked [employer] and confirmed patient's MA was approved. SW called patient back to inform her of same. Patient reports she will keep an eye out for the packet and call SW when it arrives to discuss any questions. Patient also wants to discuss plan options [facility], [facility], etc.). SW will wait to hear from patient - if patient does not get back to  by next week, SW will place a follow up call.

## 2023-09-25 ENCOUNTER — PATIENT OUTREACH (OUTPATIENT)
Dept: OBGYN CLINIC | Facility: CLINIC | Age: 28
End: 2023-09-25

## 2023-09-25 NOTE — PROGRESS NOTES
Patient left a  over the weekend requesting a return call from [facility] called patient back today who confirmed she had received the MA packet for herself and baby.  She now needs to choose a plan - requested to come into the office to meet with TERRA. TERRA and patient to meet this Thursday 09/28 at 2:30PM.

## 2023-09-28 ENCOUNTER — PATIENT OUTREACH (OUTPATIENT)
Dept: OBGYN CLINIC | Facility: CLINIC | Age: 28
End: 2023-09-28

## 2023-09-28 NOTE — PROGRESS NOTES
TERRA met with patient today to go over MA packet she received in the mail. TERRA informed patient which MA plans [facility] takes. Patient to call baby's pediatrician and look into each plan online prior to choosing. Also plans to call [employer] to ask about getting her own [employer] login connected to her application/case so she can make changes or apply for additional benefits at a later time. Patient reports she and baby are doing well postpartum. No other needs identified at this time, SW closing current referral but encouraged patient to reach out in the future if she requires further assistance from TERRA. Please re-refer as needed.

## 2023-10-12 ENCOUNTER — POSTPARTUM VISIT (OUTPATIENT)
Dept: OBGYN CLINIC | Facility: CLINIC | Age: 28
End: 2023-10-12

## 2023-10-12 VITALS
WEIGHT: 170.8 LBS | HEIGHT: 62 IN | BODY MASS INDEX: 31.43 KG/M2 | SYSTOLIC BLOOD PRESSURE: 122 MMHG | DIASTOLIC BLOOD PRESSURE: 72 MMHG

## 2023-10-12 RX ORDER — ACETAMINOPHEN AND CODEINE PHOSPHATE 120; 12 MG/5ML; MG/5ML
1 SOLUTION ORAL DAILY
Qty: 28 TABLET | Refills: 1 | Status: SHIPPED | OUTPATIENT
Start: 2023-10-12 | End: 2023-10-24

## 2023-10-12 NOTE — PROGRESS NOTES
[address]  [address], [address]    Assessment/Plan:  Abdirizak Meehan is a 25y.o. year old  who presents for postpartum visit. Routine Postpartum Care  Normal postpartum exam  Contraception: oral progesterone-only contraceptive  Depression Screen: low risk  Feeding: breast  Psychosocial support: good  Patient Education: "Fourth Trimester Project: [email or web address]"  Cervical cancer screening Up to Date, next due   Follow up in: 3 months or as needed. Additional Problems:  1. Routine postpartum follow-up          Subjective:     CC: Postpartum visit    Esther Hirsch is a 25 y.o. y.o. female [id number] who presents for a postpartum visit. She is 6 weeks postpartum following a spontaneous vaginal delivery on 23 at 35.0 weeks. Outcome: spontaneous vaginal delivery. Anesthesia: none. Postpartum course has been unremarkable. Baby's course has been unremarkable. Baby is feeding by breast.     Bleeding no bleeding. Bowel function is normal. Bladder function is normal. Patient is not sexually active. Contraception method is oral progesterone-only contraceptive. Postpartum depression screening: negative.     The following portions of the patient's history were reviewed and updated as appropriate: allergies, current medications, past family history, past medical history, obstetric history, gynecologic history, past social history, past surgical history and problem list.      Objective:  /72 (BP Location: Left arm, Patient Position: Sitting, Cuff Size: Large)   Ht 5' 2.25" (1.581 m)   Wt 77.5 kg (170 lb 12.8 oz)   LMP 2022 (Exact Date)   Breastfeeding Yes Comment: pumping  BMI 30.99 kg/m²   Pregravid Weight/BMI: 73.9 kg (163 lb) (BMI 28.88)  Current Weight: 77.5 kg (170 lb 12.8 oz)   Total Weight Gain: 12.4 kg (27 lb 6.4 oz)   Pre-Rich Vitals      Flowsheet Row Most Recent Value   Prenatal Assessment    Prenatal Vitals    Blood Pressure 122/72   Weight - Scale 77.5 kg (170 lb 12.8 oz)   Urine Albumin/Glucose    Dilation/Effacement/Station    Vaginal Drainage    Edema              General: Well appearing, no distress. Mood and affect: Appropriate. Abdomen: Soft, nontender  Thyroid: No masses  Incision: n/a  Vulva: Well healed  Vagina: Well healed. No lesions  Urethra: Normal  Cervix: Healed, no lesions  Uterus: Normal size, no masses  Adnexa: No pain or masses  Extremities: Warm and well perfused. Non tender.

## 2023-10-24 ENCOUNTER — PROCEDURE VISIT (OUTPATIENT)
Dept: OBGYN CLINIC | Facility: CLINIC | Age: 28
End: 2023-10-24

## 2023-10-24 ENCOUNTER — TELEPHONE (OUTPATIENT)
Dept: OBGYN CLINIC | Facility: CLINIC | Age: 28
End: 2023-10-24

## 2023-10-24 VITALS
DIASTOLIC BLOOD PRESSURE: 74 MMHG | SYSTOLIC BLOOD PRESSURE: 126 MMHG | HEIGHT: 63 IN | WEIGHT: 170.6 LBS | BODY MASS INDEX: 30.23 KG/M2

## 2023-10-24 DIAGNOSIS — Z30.430 ENCOUNTER FOR INSERTION OF MIRENA IUD: Primary | ICD-10-CM

## 2023-10-24 DIAGNOSIS — F41.9 ANXIETY AND DEPRESSION: Primary | ICD-10-CM

## 2023-10-24 DIAGNOSIS — F32.A ANXIETY AND DEPRESSION: ICD-10-CM

## 2023-10-24 DIAGNOSIS — Z32.02 ENCOUNTER FOR PREGNANCY TEST, RESULT NEGATIVE: ICD-10-CM

## 2023-10-24 DIAGNOSIS — F32.A ANXIETY AND DEPRESSION: Primary | ICD-10-CM

## 2023-10-24 DIAGNOSIS — F41.9 ANXIETY AND DEPRESSION: ICD-10-CM

## 2023-10-24 LAB — SL AMB POCT URINE HCG: NORMAL

## 2023-10-24 NOTE — ASSESSMENT & PLAN NOTE
Reviewed risks of insertion including perforation, migration that can lead to scarring, surgery and issues with infertility. Reviewed expulsion risk, risk of ectopic pregnancy as well as pelvic inflammatory disease. Reviewed common bleeding patterns and side effects. IUD successfully inserted. Patient tolerated procedure well. No intercourse, tampon use, soaking in bath tub, or swimming for the next 2-3 days to avoid risk of infection. Call office with excessive bleeding, cramping, fever, or chills. Return to office in 4 weeks for IUD check.

## 2023-10-24 NOTE — TELEPHONE ENCOUNTER
Spoke with patient. Recommend follow up with PCP to discuss consideration of starting back on Cymbalta if has worked well for her in the past. Has tried multiple SSRIs and have not worked well. PPD score low, seems to be exacerbation of her underlying depression and anxiety. Patient will reach out to PCP.
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
## 2023-10-24 NOTE — ASSESSMENT & PLAN NOTE
Reviewed anxiety and depression with patient in length. Referral sent to  to help her establish care with therapist/counselor. Reviewed secondary to underlying depression and anxiety would recommend consider restarting medication she was on previous. Recommend follow up with PCP for evaluation and medication discussion. Reviewed in OBGYN we are comfortable with most SSRIs but since she has failed SSRI management in the past and is not longer breastfeeding may be better managed by PCP or psychiatry.

## 2023-10-24 NOTE — TELEPHONE ENCOUNTER
----- Message from Moises Diaz RN sent at 10/24/2023  3:04 PM EDT -----  Regarding: FW: Antidepressant / Anti-anxiety Medication   Contact: [phone]    ----- Message -----  From: Suzzette Gitelman May  Sent: 10/24/2023   3:01 PM EDT  To: [facility] [place] [facility]  Subject: Antidepressant / Anti-anxiety Medication         Good Afternoon,     I was able to dig up specifics regarding which antidepressant/anti-anxiety medication I was taking. I was prescribed generic Cymbalta. Here is the exact information I was able to pull from the pharmacy [facility]:     Duloxetine hcl  60 mg cap    Thank you so much for taking the time to discuss postpartum information with me and listen to my concerns. Best Regards,    Rick Kidd

## 2023-10-26 ENCOUNTER — PATIENT OUTREACH (OUTPATIENT)
Dept: OBGYN CLINIC | Facility: CLINIC | Age: 28
End: 2023-10-26

## 2023-10-26 NOTE — PROGRESS NOTES
SW referred by Fallon Torres for PPD support/resources. SW called patient today, who only had a few minutes to talk as she had a job interview in 15 minutes. Patient reports she has been experiencing PPD, which she feels is related to her h/o depression. Denies SI/HI. She was on Cymbalta during her previous relationship but stopped when she started dating KAYLA Hernandez because she no longer needed it. [facility] is unable to prescribe Cymbata but did refer patient to SW to discuss therapy/psychiatry. Also recommended patient contact her PCP in the interim to ask about restarting Cymbalta. Patient has not yet had a chance to contact her PCP, but states she will do so soon. Patient interested in therapy and psychiatry services. SW discussed [facility] in [place] and [facility]. SW and patient discussed [facility] specifically as they have a shorter wait time for appointments and allow for virtual sessions after the initial intake. Patient expressed interest in this option and stated she felt comfortable calling - TERRA sent [facility] provider list to patient via e-mail. Otherwise patient states she is doing okay - she has had several job interviews and has been looking into childcare options. Unfortunately, she cannot load the [employer] website on her laptop or her father's computer. Was hoping to apply for subsidized childcare but unfortunately she and KAYLA are over income currently. Denies further needs at this time, SW to check back with patient next week about contacting her PCP and therapy provider.

## 2023-11-02 ENCOUNTER — PATIENT OUTREACH (OUTPATIENT)
Dept: OBGYN CLINIC | Facility: CLINIC | Age: 28
End: 2023-11-02

## 2023-11-02 NOTE — PROGRESS NOTES
TERRA called patient to check on progress with contacting her PCP and therapy offices. Patient did not answer - TERRA left  requesting a return call.

## 2023-11-06 ENCOUNTER — PATIENT OUTREACH (OUTPATIENT)
Dept: OBGYN CLINIC | Facility: CLINIC | Age: 28
End: 2023-11-06

## 2023-11-11 ENCOUNTER — HOSPITAL ENCOUNTER (OUTPATIENT)
Dept: ULTRASOUND IMAGING | Facility: HOSPITAL | Age: 28
Discharge: HOME | End: 2023-11-11
Payer: COMMERCIAL

## 2023-11-11 DIAGNOSIS — E04.2 MULTINODULAR THYROID: ICD-10-CM

## 2023-11-11 PROCEDURE — 76536 US EXAM OF HEAD AND NECK: CPT

## 2023-11-16 DIAGNOSIS — E03.9 ACQUIRED HYPOTHYROIDISM: ICD-10-CM

## 2023-11-16 DIAGNOSIS — E03.9 ACQUIRED HYPOTHYROIDISM: Primary | ICD-10-CM

## 2023-11-16 LAB
17OHP SERPL-MCNC: 199 NG/DL
CHOLEST SERPL-MCNC: 176 MG/DL (ref 100–199)
CHOLEST/HDLC SERPL: 3.1 RATIO (ref 0–4.4)
EST. AVERAGE GLUCOSE BLD GHB EST-MCNC: 100 MG/DL
FSH SERPL-ACNC: 3.7 MIU/ML
HBA1C MFR BLD: 5.1 % (ref 4.8–5.6)
HDLC SERPL-MCNC: 56 MG/DL
LDLC SERPL CALC-MCNC: 101 MG/DL (ref 0–99)
LH SERPL-ACNC: 10.8 MIU/ML
SL AMB VLDL CHOLESTEROL CALC: 19 MG/DL (ref 5–40)
T4 FREE SERPL-MCNC: 3.01 NG/DL (ref 0.82–1.77)
TESTOST FREE SERPL-MCNC: 1.9 PG/ML (ref 0–4.2)
TESTOST SERPL-MCNC: 47 NG/DL (ref 13–71)
TRIGL SERPL-MCNC: 107 MG/DL (ref 0–149)
TSH SERPL DL<=0.005 MIU/L-ACNC: 0.01 UIU/ML (ref 0.45–4.5)

## 2023-11-16 RX ORDER — LEVOTHYROXINE SODIUM 0.05 MG/1
50 TABLET ORAL DAILY
Qty: 90 TABLET | Refills: 1 | Status: SHIPPED | OUTPATIENT
Start: 2023-11-16

## 2023-11-16 RX ORDER — LEVOTHYROXINE SODIUM 0.05 MG/1
50 TABLET ORAL DAILY
Qty: 45 TABLET | Refills: 1 | Status: SHIPPED | OUTPATIENT
Start: 2023-11-16 | End: 2023-11-16

## 2023-11-17 ENCOUNTER — PATIENT OUTREACH (OUTPATIENT)
Dept: OBGYN CLINIC | Facility: CLINIC | Age: 28
End: 2023-11-17

## 2023-11-17 ENCOUNTER — OFFICE VISIT (OUTPATIENT)
Dept: FAMILY MEDICINE CLINIC | Facility: CLINIC | Age: 28
End: 2023-11-17
Payer: COMMERCIAL

## 2023-11-17 VITALS
TEMPERATURE: 98.3 F | BODY MASS INDEX: 29.37 KG/M2 | WEIGHT: 163.2 LBS | HEART RATE: 110 BPM | RESPIRATION RATE: 18 BRPM | OXYGEN SATURATION: 98 % | DIASTOLIC BLOOD PRESSURE: 70 MMHG | SYSTOLIC BLOOD PRESSURE: 112 MMHG

## 2023-11-17 DIAGNOSIS — D68.51 FACTOR 5 LEIDEN MUTATION, HETEROZYGOUS (HCC): ICD-10-CM

## 2023-11-17 DIAGNOSIS — F41.9 ANXIETY AND DEPRESSION: ICD-10-CM

## 2023-11-17 DIAGNOSIS — F32.A ANXIETY AND DEPRESSION: ICD-10-CM

## 2023-11-17 DIAGNOSIS — E03.9 ACQUIRED HYPOTHYROIDISM: ICD-10-CM

## 2023-11-17 DIAGNOSIS — Z00.00 ANNUAL PHYSICAL EXAM: Primary | ICD-10-CM

## 2023-11-17 PROCEDURE — 99395 PREV VISIT EST AGE 18-39: CPT | Performed by: PHYSICIAN ASSISTANT

## 2023-11-17 NOTE — PROGRESS NOTES
[facility]    NAME: Kinjal Kidd  AGE: 25 y.o. SEX: female  : 1995     DATE: 2023     Assessment and Plan:     Problem List Items Addressed This Visit          Endocrine    Acquired hypothyroidism       Hematopoietic and Hemostatic    Factor 5 Leiden mutation, heterozygous ([address])       Other    Anxiety and depression    Relevant Medications    DULoxetine HCl 60 MG CSDR     Other Visit Diagnoses       Annual physical exam    -  Primary            Immunizations and preventive care screenings were discussed with patient today. Appropriate education was printed on patient's after visit summary. Counseling:  Alcohol/drug use: discussed moderation in alcohol intake, the recommendations for healthy alcohol use, and avoidance of illicit drug use. Dental Health: discussed importance of regular tooth brushing, flossing, and dental visits. Exercise: the importance of regular exercise/physical activity was discussed. Recommend exercise 3-5 times per week for at least 30 minutes. BMI Counseling: Body mass index is 29.37 kg/m². The BMI is above normal. Nutrition recommendations include decreasing portion sizes, encouraging healthy choices of fruits and vegetables, decreasing fast food intake, consuming healthier snacks, limiting drinks that contain sugar, moderation in carbohydrate intake, increasing intake of lean protein, reducing intake of saturated and trans fat and reducing intake of cholesterol. Exercise recommendations include exercising 3-5 times per week. No pharmacotherapy was ordered. Rationale for BMI follow-up plan is due to patient being overweight or obese. Return in 3 months (on 2024) for Recheck anxiety and depression recheck. .     Chief Complaint:     Chief Complaint   Patient presents with    Annual Exam     Would like to go back on [facility]      History of Present Illness:     Adult Annual Physical   Patient here for a comprehensive physical exam. The patient reports  patient has been having some recurrence in her anxiety and depression and is inquiring about restarting her Cymbalta. Any SI or HI. Libby Adriel Diet and Physical Activity  Diet/Nutrition:  Poor appetite . Exercise: walking. Depression Screening  PHQ-2/9 Depression Screening    Little interest or pleasure in doing things: 0 - not at all  Feeling down, depressed, or hopeless: 0 - not at all  Trouble falling or staying asleep, or sleeping too much: 0 - not at all  Feeling tired or having little energy: 0 - not at all  Poor appetite or overeatin - not at all  Feeling bad about yourself - or that you are a failure or have let yourself or your family down: 0 - not at all  Trouble concentrating on things, such as reading the newspaper or watching television: 0 - not at all  Moving or speaking so slowly that other people could have noticed. Or the opposite - being so fidgety or restless that you have been moving around a lot more than usual: 0 - not at all  Thoughts that you would be better off dead, or of hurting yourself in some way: 0 - not at all  PHQ-9 Score: 0   PHQ-9 Interpretation: No or Minimal depression          General Health  Sleep: gets 4-6 hours of sleep on average. Hearing: normal - bilateral.  Vision: wears glasses. Dental: regular dental visits. /GYN Health  Follows with gynecology? yes   Last menstrual period: December  Contraceptive method: IUD placement. History of STDs?: no.     Advanced Care Planning  Do you have an advanced directive? no  Do you have a durable medical power of ? no     Review of Systems:     Review of Systems   Constitutional: Negative. HENT: Negative. Respiratory: Negative. Cardiovascular: Negative. Gastrointestinal: Negative. Genitourinary: Negative. Neurological: Negative.     Psychiatric/Behavioral:  Negative for agitation, behavioral problems, dysphoric mood, hallucinations, self-injury and suicidal ideas. The patient is nervous/anxious. All other systems reviewed and are negative. Past Medical History:     Past Medical History:   Diagnosis Date    Anxiety and depression     previously on Cymbalta, stopped before fall 2022 due to cost of visits. Currently no medication and self-manage. Factor 5 Leiden mutation, heterozygous ([address])     Genital herpes     Last out break per pt months ago (02/27/23). On Valtrex only w/outbreak. History of loop electrical excision procedure (LEEP) 05/2019    Human papilloma virus     Hypothyroid     Currently on Levothyroxine 25mg and in the process of finding a new endocrinologist. Does not remember when last TSH check. Miscarriage 9/16/22    This was my first pregnancy. The miscarriage started Friday, 9/16/22    PCOS (polycystic ovarian syndrome)     Currently on metformin 500 mg x2 daily. Past Surgical History:     Past Surgical History:   Procedure Laterality Date    CERVICAL BIOPSY  W/ LOOP ELECTRODE EXCISION      US GUIDED THYROID BIOPSY  5/2/2023    US GUIDED THYROID BIOPSY  8/9/2019    WISDOM TOOTH EXTRACTION        Social History:     Social History     Socioeconomic History    Marital status: Single     Spouse name: None    Number of children: None    Years of education: None    Highest education level: None   Occupational History    None   Tobacco Use    Smoking status: Former     Packs/day: 0.25     Years: 2.00     Total pack years: 0.50     Types: Cigarettes     Passive exposure: Never    Smokeless tobacco: Never    Tobacco comments:     As soon as I found out that I was pregnant, I completely stopped smoking.    Vaping Use    Vaping Use: Never used   Substance and Sexual Activity    Alcohol use: Yes     Comment: I rarely drink    Drug use: Never    Sexual activity: Not Currently     Partners: Male     Birth control/protection: None     Comment: no new partner in past year   Other Topics Concern    None   Social History Narrative    None     Social Determinants of Health     Financial Resource Strain: Not on file   Food Insecurity: Not on file   Transportation Needs: Not on file   Physical Activity: Not on file   Stress: Not on file   Social Connections: Not on file   Intimate Partner Violence: Not At Risk (11/17/2023)    Humiliation, Afraid, Rape, and Kick questionnaire     Fear of Current or Ex-Partner: No     Emotionally Abused: No     Physically Abused: No     Sexually Abused: No   Housing Stability: Not on file      Family History:     Family History   Problem Relation Age of Onset    Thyroid disease Mother     Hypothyroidism Mother     Depression Father     Cancer Father         Multiple Myloma    Anxiety disorder Father     Deep vein thrombosis Father       Current Medications:     Current Outpatient Medications   Medication Sig Dispense Refill    DULoxetine HCl 60 MG CSDR Take 1 tablet by mouth in the morning 30 capsule 2    ibuprofen (MOTRIN) 200 mg tablet Take 1 tablet (200 mg total) by mouth every 6 (six) hours as needed for mild pain      levothyroxine 50 mcg tablet TAKE 1 TABLET BY MOUTH ONCE DAILY 90 tablet 1    valACYclovir (VALTREX) 500 mg tablet if needed Takes when needed       No current facility-administered medications for this visit. Allergies:     No Known Allergies   Physical Exam:     /70 (BP Location: Left arm, Patient Position: Sitting, Cuff Size: Standard)   Pulse (!) 110   Temp 98.3 °F (36.8 °C) (Tympanic)   Resp 18   Wt 74 kg (163 lb 3.2 oz)   LMP 10/19/2023 (Approximate)   SpO2 98%   BMI 29.37 kg/m²     Physical Exam  Vitals and nursing note reviewed. Constitutional:       General: She is not in acute distress. Appearance: She is not ill-appearing, toxic-appearing or diaphoretic. HENT:      Head: Normocephalic and atraumatic. Nose: Nose normal.      Mouth/Throat:      Mouth: Mucous membranes are moist.      Pharynx: Oropharynx is clear. Eyes:      General: No scleral icterus. Extraocular Movements: Extraocular movements intact. Conjunctiva/sclera: Conjunctivae normal.      Pupils: Pupils are equal, round, and reactive to light. Cardiovascular:      Rate and Rhythm: Normal rate and regular rhythm. Heart sounds: Normal heart sounds. Pulmonary:      Effort: Pulmonary effort is normal.      Breath sounds: Normal breath sounds. Abdominal:      General: Bowel sounds are normal.      Palpations: Abdomen is soft. Tenderness: There is no abdominal tenderness. Musculoskeletal:      Cervical back: Neck supple. Right lower leg: No edema. Left lower leg: No edema. Lymphadenopathy:      Cervical: No cervical adenopathy. Skin:     General: Skin is warm and dry. Capillary Refill: Capillary refill takes less than 2 seconds. Neurological:      General: No focal deficit present. Mental Status: She is alert and oriented to person, place, and time. Motor: No weakness.    Psychiatric:         Mood and Affect: Mood normal.          Selena Amado PA-C   [facility]

## 2023-11-17 NOTE — PROGRESS NOTES
SW called patient to check in after her PCP appointment. Patient reports she was started on Cymbalta 30mg - was on 60mg before and it worked well. Has a follow up appointment in 3 months. Reports things are going well otherwise. Plans to spend the holidays with her mother and FOB's family. Baby is doing well. Still not interested in therapy - wants to wait to see how she does on Cymbalta. Denies additional needs at this time, SW to check in with patient in one month.

## 2023-11-17 NOTE — PATIENT INSTRUCTIONS
Wellness Visit for Adults   AMBULATORY CARE:   A wellness visit  is when you see your healthcare provider to get screened for health problems. Your healthcare provider will also give you advice on how to stay healthy. Write down your questions so you remember to ask them. Ask your healthcare provider how often you should have a wellness visit. What happens at a wellness visit:  Your healthcare provider will ask about your health, and your family history of health problems. This includes high blood pressure, heart disease, and cancer. He or she will ask if you have symptoms that concern you, if you smoke, and about your mood. You may also be asked about your intake of medicines, supplements, food, and alcohol. Any of the following may be done: Your weight  will be checked. Your height may also be checked so your body mass index (BMI) can be calculated. Your BMI shows if you are at a healthy weight. Your blood pressure  and heart rate will be checked. Your temperature may also be checked. Blood and urine tests  may be done. Blood tests may be done to check your cholesterol levels. Abnormal cholesterol levels increase your risk for heart disease and stroke. You may also need a blood or urine test to check for diabetes if you are at increased risk. Urine tests may be done to look for signs of an infection or kidney disease. A physical exam  includes checking your heartbeat and lungs with a stethoscope. Your healthcare provider may also check your skin to look for sun damage. Screening tests  may be recommended. A screening test is done to check for diseases that may not cause symptoms. The screening tests you may need depend on your age, gender, family history, and lifestyle habits. For example, colorectal screening may be recommended if you are 48years old or older. Screening tests you need if you are a woman:   A Pap smear  is used to screen for cervical cancer.  Pap smears are usually done every 3 to 5 years depending on your age. You may need them more often if you have had abnormal Pap smear test results in the past. Ask your healthcare provider how often you should have a Pap smear. A mammogram  is an x-ray of your breasts to screen for breast cancer. Experts recommend mammograms every 2 years starting at age 48 years. You may need a mammogram at age 52 years or younger if you have an increased risk for breast cancer. Talk to your healthcare provider about when you should start having mammograms and how often you need them. Vaccines you may need:   Get an influenza vaccine  every year. The influenza vaccine protects you from the flu. Several types of viruses cause the flu. The viruses change over time, so new vaccines are made each year. Get a tetanus-diphtheria (Td) booster vaccine  every 10 years. This vaccine protects you against tetanus and diphtheria. Tetanus is a severe infection that may cause painful muscle spasms and lockjaw. Diphtheria is a severe bacterial infection that causes a thick covering in the back of your mouth and throat. Get a human papillomavirus (HPV) vaccine  if you are female and aged 23 to 32 or male 23 to 24 and never received it. This vaccine protects you from HPV infection. HPV is the most common infection spread by sexual contact. HPV may also cause vaginal, penile, and anal cancers. Get a pneumococcal vaccine  if you are aged 72 years or older. The pneumococcal vaccine is an injection given to protect you from pneumococcal disease. Pneumococcal disease is an infection caused by pneumococcal bacteria. The infection may cause pneumonia, meningitis, or an ear infection. Get a shingles vaccine  if you are 60 or older, even if you have had shingles before. The shingles vaccine is an injection to protect you from the varicella-zoster virus. This is the same virus that causes chickenpox.  Shingles is a painful rash that develops in people who had chickenpox or have been exposed to the virus. How to eat healthy:  My Plate is a model for planning healthy meals. It shows the types and amounts of foods that should go on your plate. Fruits and vegetables make up about half of your plate, and grains and protein make up the other half. A serving of dairy is included on the side of your plate. The amount of calories and serving sizes you need depends on your age, gender, weight, and height. Examples of healthy foods are listed below:  Eat a variety of vegetables  such as dark green, red, and orange vegetables. You can also include canned vegetables low in sodium (salt) and frozen vegetables without added butter or sauces. Eat a variety of fresh fruits , canned fruit in 100% juice, frozen fruit, and dried fruit. Include whole grains. At least half of the grains you eat should be whole grains. Examples include whole-wheat bread, wheat pasta, brown rice, and whole-grain cereals such as oatmeal.    Eat a variety of protein foods such as seafood (fish and shellfish), lean meat, and poultry without skin (turkey and chicken). Examples of lean meats include pork leg, shoulder, or tenderloin, and beef round, sirloin, tenderloin, and extra lean ground beef. Other protein foods include eggs and egg substitutes, beans, peas, soy products, nuts, and seeds. Choose low-fat dairy products such as skim or 1% milk or low-fat yogurt, cheese, and cottage cheese. Limit unhealthy fats  such as butter, hard margarine, and shortening. Exercise:  Exercise at least 30 minutes per day on most days of the week. Some examples of exercise include walking, biking, dancing, and swimming. You can also fit in more physical activity by taking the stairs instead of the elevator or parking farther away from stores. Include muscle strengthening activities 2 days each week. Regular exercise provides many health benefits.  It helps you manage your weight, and decreases your risk for type 2 diabetes, heart disease, stroke, and high blood pressure. Exercise can also help improve your mood. Ask your healthcare provider about the best exercise plan for you. General health and safety guidelines:   Do not smoke. Nicotine and other chemicals in cigarettes and cigars can cause lung damage. Ask your healthcare provider for information if you currently smoke and need help to quit. E-cigarettes or smokeless tobacco still contain nicotine. Talk to your healthcare provider before you use these products. Limit alcohol. A drink of alcohol is 12 ounces of beer, 5 ounces of wine, or 1½ ounces of liquor. Lose weight, if needed. Being overweight increases your risk of certain health conditions. These include heart disease, high blood pressure, type 2 diabetes, and certain types of cancer. Protect your skin. Do not sunbathe or use tanning beds. Use sunscreen with a SPF 15 or higher. Apply sunscreen at least 15 minutes before you go outside. Reapply sunscreen every 2 hours. Wear protective clothing, hats, and sunglasses when you are outside. Drive safely. Always wear your seatbelt. Make sure everyone in your car wears a seatbelt. A seatbelt can save your life if you are in an accident. Do not use your cell phone when you are driving. This could distract you and cause an accident. Pull over if you need to make a call or send a text message. Practice safe sex. Use latex condoms if are sexually active and have more than one partner. Your healthcare provider may recommend screening tests for sexually transmitted infections (STIs). Wear helmets, lifejackets, and protective gear. Always wear a helmet when you ride a bike or motorcycle, go skiing, or play sports that could cause a head injury. Wear protective equipment when you play sports. Wear a lifejacket when you are on a boat or doing water sports.     © Copyright Yasmin Holder 2023 Information is for End User's use only and may not be sold, redistributed or otherwise used for commercial purposes. The above information is an  only. It is not intended as medical advice for individual conditions or treatments. Talk to your doctor, nurse or pharmacist before following any medical regimen to see if it is safe and effective for you.

## 2023-11-29 ENCOUNTER — OFFICE VISIT (OUTPATIENT)
Dept: OBGYN CLINIC | Facility: CLINIC | Age: 28
End: 2023-11-29
Payer: COMMERCIAL

## 2023-11-29 VITALS
SYSTOLIC BLOOD PRESSURE: 110 MMHG | DIASTOLIC BLOOD PRESSURE: 60 MMHG | WEIGHT: 161.8 LBS | BODY MASS INDEX: 28.67 KG/M2 | HEIGHT: 63 IN

## 2023-11-29 DIAGNOSIS — F33.41 RECURRENT MAJOR DEPRESSIVE DISORDER, IN PARTIAL REMISSION (HCC): ICD-10-CM

## 2023-11-29 DIAGNOSIS — Z30.431 IUD CHECK UP: Primary | ICD-10-CM

## 2023-11-29 PROCEDURE — 99213 OFFICE O/P EST LOW 20 MIN: CPT | Performed by: PHYSICIAN ASSISTANT

## 2023-11-29 NOTE — PROGRESS NOTES
Assessment/Plan:    Recurrent major depressive disorder, in partial remission ([address])  Following closely with PCP, restarted Cymbalta and feeling much better. IUD check up  Doing well on Mirena IUD would like to continue. Will return to office for annual or as needed. Problem List Items Addressed This Visit          Other    Recurrent major depressive disorder, in partial remission ([address])     Following closely with PCP, restarted Cymbalta and feeling much better. IUD check up - Primary     Doing well on Mirena IUD would like to continue. Will return to office for annual or as needed. Subjective:      Patient ID: Emerald Canales is a 25 y.o. female. HPI  24 yo seen for IUD check and follow up from PP depression. Mirena IUD was inserted 10/24/2023 doing well with no issues. Patient was started on Cymbalta by PCP and reports is feeling much better. PPD score today is 9. The following portions of the patient's history were reviewed and updated as appropriate: She  has a past medical history of Anxiety and depression, Factor 5 Leiden mutation, heterozygous ([address]), Genital herpes, History of loop electrical excision procedure (LEEP) (2019), Human papilloma virus, Hypothyroid, Miscarriage (22), and PCOS (polycystic ovarian syndrome).   She   Patient Active Problem List    Diagnosis Date Noted    IUD check up 2023    Encounter for insertion of mirena IUD 10/24/2023    Anxiety and depression 10/24/2023     delivery 2023    Normal labor and delivery 2023    Encounter for other specified  screening 2023    Gastroesophageal reflux in pregnancy 2023    Factor 5 Leiden mutation, heterozygous ([address])      screening encounter 2023    Acute vaginitis 2023    Multinodular thyroid 2023    Thyroid disease during pregnancy in third trimester 2023    35 weeks gestation of pregnancy 2023    Genital herpes simplex virus (HSV) infection in mother affecting pregnancy 03/29/2023    History of cervical LEEP biopsy affecting care of mother, antepartum, second trimester 02/27/2023    Anxiety 06/07/2022    Recurrent major depressive disorder, in partial remission ([address]) 06/07/2022    Acquired hypothyroidism 06/07/2022    Polycystic ovarian syndrome 06/07/2022    Herpes simplex infection of genitourinary system 06/07/2022    Human papilloma virus 06/07/2022     She  has a past surgical history that includes Cervical biopsy w/ loop electrode excision; [place] tooth extraction; US guided thyroid biopsy (5/2/2023); and US guided thyroid biopsy (8/9/2019). Her family history includes Anxiety disorder in her father; Cancer in her father; Deep vein thrombosis in her father; Depression in her father; Hypothyroidism in her mother; Thyroid disease in her mother. She  reports that she has quit smoking. Her smoking use included cigarettes. She has a 0.5 pack-year smoking history. She has never been exposed to tobacco smoke. She has never used smokeless tobacco. She reports current alcohol use. She reports that she does not use drugs. Current Outpatient Medications   Medication Sig Dispense Refill    DULoxetine HCl 60 MG CSDR Take 1 tablet by mouth in the morning 30 capsule 2    ibuprofen (MOTRIN) 200 mg tablet Take 1 tablet (200 mg total) by mouth every 6 (six) hours as needed for mild pain      Levonorgestrel (MIRENA) 20 MCG/DAY IUD 1 each by Intrauterine route once      levothyroxine 50 mcg tablet TAKE 1 TABLET BY MOUTH ONCE DAILY 90 tablet 1    valACYclovir (VALTREX) 500 mg tablet if needed Takes when needed       No current facility-administered medications for this visit. She has No Known Allergies. .    Review of Systems   Constitutional:  Negative for fatigue, fever and unexpected weight change. HENT:  Negative for dental problem and sinus pressure. Eyes:  Negative for visual disturbance.    Respiratory:  Negative for cough, shortness of breath and wheezing. Cardiovascular:  Negative for chest pain. Gastrointestinal:  Negative for abdominal pain, blood in stool, constipation, diarrhea, nausea and vomiting. Endocrine: Negative for polydipsia. Genitourinary:  Negative for difficulty urinating, dyspareunia, dysuria, frequency, hematuria, pelvic pain and urgency. Musculoskeletal:  Negative for arthralgias and back pain. Neurological:  Negative for dizziness, seizures, light-headedness and headaches. Psychiatric/Behavioral:  Negative for suicidal ideas. The patient is not nervous/anxious. Objective:      /60 (BP Location: Left arm, Patient Position: Sitting, Cuff Size: Standard)   Ht 5' 2.5" (1.588 m)   Wt 73.4 kg (161 lb 12.8 oz)   LMP 11/21/2023 (Approximate)   Breastfeeding No   BMI 29.12 kg/m²          Physical Exam  Constitutional:       Appearance: She is well-developed. Neck:      Thyroid: No thyromegaly. Cardiovascular:      Rate and Rhythm: Normal rate and regular rhythm. Heart sounds: Normal heart sounds. No murmur heard. No friction rub. No gallop. Pulmonary:      Effort: Pulmonary effort is normal. No respiratory distress. Breath sounds: Normal breath sounds. No wheezing or rales. Chest:      Chest wall: No tenderness. Abdominal:      General: Abdomen is flat. There is no distension. Palpations: Abdomen is soft. There is no mass. Tenderness: There is no abdominal tenderness. There is no guarding or rebound. Hernia: No hernia is present. Genitourinary:     Labia:         Right: No rash, tenderness, lesion or injury. Left: No rash, tenderness, lesion or injury. Urethra: No prolapse, urethral pain, urethral swelling or urethral lesion. Vagina: No signs of injury and foreign body. No vaginal discharge, erythema, tenderness or bleeding. Cervix: No cervical motion tenderness, discharge or friability.       Comments: IUD strings seen  Skin:     General: Skin is warm and dry. Coloration: Skin is not pale. Findings: No erythema or rash. Neurological:      Mental Status: She is alert and oriented to person, place, and time. Psychiatric:         Behavior: Behavior normal.           PHQ-E Flowsheet Screening      Flowsheet Row Most Recent Value   Vanderbilt  Depression Scale: In the Past 7 Days    I have been able to laugh and see the funny side of things. 0   I have looked forward with enjoyment to things. 0   I have blamed myself unnecessarily when things went wrong. 2   I have been anxious or worried for no good reason. 2   I have felt scared or panicky for no good reason. 1   Things have been getting on top of me. 1   I have been so unhappy that I have had difficulty sleeping. 1   I have felt sad or miserable. 1   I have been so unhappy that I have been crying.  1   The thought of harming myself has occurred to me. 0   Vanderbilt  Depression Scale Total 9

## 2023-12-04 NOTE — PROGRESS NOTES
[address] [facility] coding opportunities       Chart reviewed, no opportunity found: CHART REVIEWED, NO OPPORTUNITY FOUND        Patients Insurance     Medicare Insurance: Medicare SW called patient to check in - patient reports she is doing okay, but very tired. She called her PCP and has an appointment with them on 11/13 to discuss medication. She reports she wants to wait on therapy/psychiatry until after this visit. Unsure if she wants to go to therapy and also reports money is tight for therapy co-pays. Has to call [facility]'s billing to set up payment plan. She unfortunately has not had much success with locating a job - last few interviews did not work out. Patient continues to try finding a  job through [employer], [employer], [address] [place] [address], and [facility]. Would like to [facility] due to  but has been considering other alternatives. Denies needing additional resources from [facility] today; agreeable to [facility] checking in after her PCP appointment next week to discuss medication.

## 2023-12-08 ENCOUNTER — PATIENT OUTREACH (OUTPATIENT)
Dept: OBGYN CLINIC | Facility: CLINIC | Age: 28
End: 2023-12-08

## 2023-12-08 NOTE — PROGRESS NOTES
Patient called [facility] and left a VM stating KAYLA lost his job and they are having financial difficulties. She requested [facility]'s assistance with applying for SNAP as she is unable to access [employer] on her laptop or phone. Due to sensitive nature of her call, patient requested that [facility] did not call her back by phone, but instead send her an email reply. [facility] sent email offering to assist patient with SNAP and inquiring when she is available. TERRA also offered to send food bank information if patient is interested. Patient agreeable to speaking with [facility] on Tuesday to fill out SNAP application. Patient denied needing food bank info as she already had access to some and has 2 in mind she will be visiting soon.

## 2023-12-12 ENCOUNTER — PATIENT OUTREACH (OUTPATIENT)
Dept: OBGYN CLINIC | Facility: CLINIC | Age: 28
End: 2023-12-12

## 2023-12-12 NOTE — PROGRESS NOTES
SW and patient completed SNAP application over the phone today. Unfortunately SW is unable to submit the application as the [employer] website states the case record number is incorrect for patient's current benefits - unable to link new application with current case. SW called [employer]; waited on hold for 40 minutes before reaching a representative. Representative stated they could not locate patient's account using her SSN. SW provided old case number, and representative was able to locate patient's information. Unfortunately he could not find the attached release form signed and uploaded during initial MA application in August. Therefore, representative would not provide SW with any further information and requested that SW resend the release form via fax to [facility] ([phone]). SW resent release form along with baby's birth certificate, which is due by 12/18. SW kept patient updated on situation both via phone and email. SW will try calling [employer] again later this week as advised by [facility] representative. In the meantime, patient did confirm that she has successfully connected with two pantries for assistance with food.

## 2023-12-13 PROBLEM — Z30.431 IUD CHECK UP: Status: ACTIVE | Noted: 2023-12-13

## 2023-12-14 ENCOUNTER — OFFICE VISIT (OUTPATIENT)
Dept: ENDOCRINOLOGY | Facility: HOSPITAL | Age: 28
End: 2023-12-14
Payer: COMMERCIAL

## 2023-12-14 VITALS
WEIGHT: 160 LBS | BODY MASS INDEX: 28.35 KG/M2 | SYSTOLIC BLOOD PRESSURE: 120 MMHG | DIASTOLIC BLOOD PRESSURE: 80 MMHG | HEIGHT: 63 IN | OXYGEN SATURATION: 98 % | HEART RATE: 88 BPM

## 2023-12-14 DIAGNOSIS — E03.9 ACQUIRED HYPOTHYROIDISM: Primary | ICD-10-CM

## 2023-12-14 DIAGNOSIS — E04.2 MULTINODULAR THYROID: ICD-10-CM

## 2023-12-14 DIAGNOSIS — E28.2 POLYCYSTIC OVARIAN SYNDROME: ICD-10-CM

## 2023-12-14 PROCEDURE — 99214 OFFICE O/P EST MOD 30 MIN: CPT | Performed by: STUDENT IN AN ORGANIZED HEALTH CARE EDUCATION/TRAINING PROGRAM

## 2023-12-14 RX ORDER — SPIRONOLACTONE 50 MG/1
50 TABLET, FILM COATED ORAL 2 TIMES DAILY
Qty: 200 TABLET | Refills: 1 | Status: SHIPPED | OUTPATIENT
Start: 2023-12-14

## 2023-12-14 NOTE — PROGRESS NOTES
Established Patient Progress Note       Chief Complaint   Patient presents with    Hypothyroidism    Polycystic Ovary Syndrome      History of Present Illness:     Naomy Cobb is a 25 y.o. female with a history of subclinical hypothyroidism on levothyroxine, multinodular thyroid s/p FNA of 0.9cm right thyroid nodule in 08/2019 with neg results, repeat US 04/23 showed 2.6cm TR4 nodule b/l s/p FNA 05/23 right nodule- neg FNA, left nodule was bethesda 1- non diagnostic. PCOS since age 19 who presents today for post partum hypothyroidism/PCOS management. Subclinical hypothyroidism:- Dx with thyroid issue in 2017, Started on 25mcg levothyroxine. Dose inc during pregnancy to 112mcg. Since delivery had to reduce dose since last labs showed overreplacement. Dose reduced to 50mcg daily with repeat labs recommended? Reports has been feeling well, does feel some tiredness but not worse than before. Denies any constipation, dry skin, brittle nails. Has IUD now    Dx of Non toxic Multinodular goiter:- Diagnosed based on exam-US done in 2018/2019, had FNA done twice as well but reports in file only for 1 right sided nodule 0.9cm in side- neg for malignancy in 2019. Repeat US 2023 showed growth in b/l nodules 2.6cm TR4 and therefore underwent FNA for both on 05/23. Right nodule benign but left sample was non diagnostic. Hx of radiation to neck, family hx of thyroid cancer. Repeat US ordered 11/23 showed stable 2.5cm Right TR4 nodule and 2cm TR4 left mid gland nodule. Denies any dysphagia, no odynophagia. Dx PCOS- age 19. Previously on OCP for menstral regulation, taken off this since was dx with factor 5 leiden def, started on IUD instead,  first pregnancy- naturally conceived. Previously on metformin but dx during pregnancy d/t normal BG. Never been on aldactone in past. Has some hair growth on face but manageable.   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 Does not drink alcohol, no smoking anymore, no other drugs   Fhx:   thyroid disorder- Mother had thyroid issues. Possible PCOS in mother. Patient Active Problem List   Diagnosis    Anxiety    Recurrent major depressive disorder, in partial remission (HCC)    Acquired hypothyroidism    Polycystic ovarian syndrome    Herpes simplex infection of genitourinary system    Human papilloma virus    History of cervical LEEP biopsy affecting care of mother, antepartum, second trimester    Thyroid disease during pregnancy in third trimester    35 weeks gestation of pregnancy    Genital herpes simplex virus (HSV) infection in mother affecting pregnancy    Multinodular thyroid     screening encounter    Acute vaginitis    Factor 5 Leiden mutation, heterozygous ([address])    Gastroesophageal reflux in pregnancy    Encounter for other specified  screening    Normal labor and delivery     delivery    Encounter for insertion of mirena IUD    Anxiety and depression    IUD check up      Past Medical History:   Diagnosis Date    Anxiety and depression     previously on Cymbalta, stopped before 2022 due to cost of visits. Currently no medication and self-manage. Factor 5 Leiden mutation, heterozygous ([address])     Genital herpes     Last out break per pt months ago (23). On Valtrex only w/outbreak. History of loop electrical excision procedure (LEEP) 2019    Human papilloma virus     Hypothyroid     Currently on Levothyroxine 25mg and in the process of finding a new endocrinologist. Does not remember when last TSH check. Miscarriage 20    This was my first pregnancy. The miscarriage started Friday, 22    PCOS (polycystic ovarian syndrome)     Currently on metformin 500 mg x2 daily.       Past Surgical History:   Procedure Laterality Date CERVICAL BIOPSY  W/ LOOP ELECTRODE EXCISION      US GUIDED THYROID BIOPSY  5/2/2023    US GUIDED THYROID BIOPSY  8/9/2019    WISDOM TOOTH EXTRACTION        Family History   Problem Relation Age of Onset    Thyroid disease Mother     Hypothyroidism Mother     Depression Father     Cancer Father         Multiple Myloma    Anxiety disorder Father     Deep vein thrombosis Father      Social History     Tobacco Use    Smoking status: Former     Current packs/day: 0.25     Average packs/day: 0.3 packs/day for 2.0 years (0.5 ttl pk-yrs)     Types: Cigarettes     Passive exposure: Never    Smokeless tobacco: Never    Tobacco comments:     As soon as I found out that I was pregnant, I completely stopped smoking. Substance Use Topics    Alcohol use: Yes     Comment: I rarely drink     No Known Allergies    Current Outpatient Medications:     DULoxetine HCl 60 MG CSDR, Take 1 tablet by mouth in the morning, Disp: 30 capsule, Rfl: 2    Levonorgestrel (MIRENA) 20 MCG/DAY IUD, 1 each by Intrauterine route once, Disp: , Rfl:     levothyroxine 50 mcg tablet, TAKE 1 TABLET BY MOUTH ONCE DAILY, Disp: 90 tablet, Rfl: 1    spironolactone (ALDACTONE) 50 mg tablet, Take 1 tablet (50 mg total) by mouth 2 (two) times a day, Disp: 200 tablet, Rfl: 1    valACYclovir (VALTREX) 500 mg tablet, if needed Takes when needed, Disp: , Rfl:     Review of Systems   Constitutional:  Negative for fatigue and unexpected weight change. HENT:  Negative for trouble swallowing and voice change. Eyes:  Negative for photophobia and visual disturbance. Gastrointestinal:  Negative for abdominal pain, constipation, diarrhea and nausea. Endocrine: Negative for cold intolerance and heat intolerance. Genitourinary: Negative. Musculoskeletal: Negative. Psychiatric/Behavioral: Negative. Physical Exam:  Body mass index is 28.8 kg/m².   /80   Pulse 88   Ht 5' 2.5" (1.588 m)   Wt 72.6 kg (160 lb)   LMP 11/21/2023 (Approximate)   SpO2 98%   BMI 28.80 kg/m²    Wt Readings from Last 3 Encounters:   12/14/23 72.6 kg (160 lb)   11/29/23 73.4 kg (161 lb 12.8 oz)   11/17/23 74 kg (163 lb 3.2 oz)       Physical Exam  Constitutional:       Appearance: Normal appearance. She is obese. Neck:      Comments: Diffusely enlarged thyroid   Cardiovascular:      Rate and Rhythm: Normal rate and regular rhythm. Pulses: Normal pulses. Pulmonary:      Effort: Pulmonary effort is normal.   Abdominal:      General: Bowel sounds are normal.      Palpations: Abdomen is soft. Comments: Gravid   Skin:     General: Skin is warm and dry. Capillary Refill: Capillary refill takes less than 2 seconds. Neurological:      General: No focal deficit present. Mental Status: She is alert and oriented to person, place, and time. Psychiatric:         Mood and Affect: Mood normal.         Labs:    Latest Reference Range & Units 11/13/23 09:40   17-OH PROGESTERONE ng/dL 199   LUTEINIZING HORMONE mIU/mL 10.8   FSH, POC mIU/mL 3.7   Hemoglobin A1C 4.8 - 5.6 % 5.1   eAG, EST AVG Glucose mg/dL 100   Testosterone, Total, LC/MS 13 - 71 ng/dL 47   TESTOSTERONE FREE 0.0 - 4.2 pg/mL 1.9   TSH, POC 0.450 - 4.500 uIU/mL 0.010 (L)   Free T4 0.82 - 1.77 ng/dL 3.01 (H)   (L): Data is abnormally low  (H): Data is abnormally high    Radiology-     11/23  THYROID ULTRASOUND     INDICATION:    E04.2: Nontoxic multinodular goiter. COMPARISON: Ultrasound from March 25, 2023  TECHNIQUE:   Ultrasound of the thyroid was performed with a high frequency linear transducer in transverse and sagittal planes including volumetric imaging sweeps as well as traditional still imaging technique. FINDINGS:  Normal homogeneous smooth echotexture. Right lobe: 5.0 x 2.3 x 1.6 cm. Volume 9.1 mL  Left lobe:  3.8 x 1.6 x 1.2 cm. Volume 3.3 mL  Isthmus: 0.4  cm. Nodule 1. Image 8 series 1. Right mid gland measuring 2.5 x 0.8 by 0.9. Cm.  Previously measuring 2.6 x 1.1 x Ely Asai 9  COMPOSITION: 2 points, solid or almost completely solid. ECHOGENICITY:  2 points, hypoechoic. SHAPE:  0 points, wider-than-tall. MARGIN: 0 points, ill-defined. ECHOGENIC FOCI:  0 points, none or large comet-tail artifacts. TI-RADS Classification: TR 4 (4-6 points), FNA if > 1.5 cm. Follow if > 1cm. This nodule has a previous benign biopsy and remains stable     Nodule 2. Image 132 series 4000 and image 52 series 46 and. Mid left thyroid lobe measuring 2 x 0.7x 0.8  cm. Previously measuring 2.6 x 0.7 x 1 cm stable taking into account the difference in the technique  COMPOSITION: 2 points, solid or almost completely solid. ECHOGENICITY:  2 points, hypoechoic. SHAPE:  0 points, wider-than-tall. MARGIN: 0 points, ill-defined. ECHOGENIC FOCI:  0 points, none or large comet-tail artifacts. TI-RADS Classification: TR 4 (4-6 points), FNA if > 1.5 cm. Follow if > 1cm. IMPRESSION:  The previously noted nodules remain stable  These biopsies of benign results. Surveillance at 1 to 2 years can be continued      THYROID ULTRASOUND     INDICATION:    E04.1: Nontoxic single thyroid nodule. COMPARISON:  None available; report of outside thyroid ultrasound 1/24/2019 and biopsy 8/9/2019     TECHNIQUE:   Ultrasound of the thyroid was performed with a high frequency linear transducer in transverse and sagittal planes including volumetric imaging sweeps as well as traditional still imaging technique. FINDINGS:  Thyroid parenchyma is diffusely heterogeneous in echotexture. Right lobe: 5.1 x 2.1 x 1.5 cm. Volume 7.8 mL  Left lobe:  4.0 x 1.6 x 1.0 cm. Volume 2.9 mL  Isthmus: 0.3  cm. Nodule #1. Image 7. RIGHT midgland nodule measuring 2.6 x 0.9 x 1.2 cm. COMPOSITION:  2 points, solid or almost completely solid . ECHOGENICITY:  2 points, hypoechoic. SHAPE:  0 points, wider-than-tall. MARGIN: 0 points, smooth. ECHOGENIC FOCI:  0 points, none or large comet-tail artifacts.   TI-RADS Classification: TR 4 (4-6 points),  FNA if > 1.5 cm. Follow if > 1cm. This nodule has had a previous benign biopsy. Nodule #2. Image 35. LEFT midgland nodule measuring 2.6 x 0.7 x 1.0 cm. COMPOSITION:  2 points, solid or almost completely solid . ECHOGENICITY:  2 points, hypoechoic. SHAPE:  0 points, wider-than-tall. MARGIN: 0 points, smooth. ECHOGENIC FOCI:  0 points, none or large comet-tail artifacts. TI-RADS Classification: TR 4 (4-6 points), FNA if > 1.5 cm. Follow if > 1cm. IMPRESSION:     The left midgland nodule meets current ACR criteria for recommending ultrasound guided biopsy. Right midgland nodule has prior benign biopsy. Recommend follow up in 12-24 months for this nodule. Reference: ACR Thyroid Imaging, Reporting and Data System (TI-RADS): White Paper of the [employer]. J AM Kurtis Radiol 5551;91:736-717. (additional recommendations based on American Thyroid Association 2015 guidelines.)     05/23  The study was marked in [facility] for significant notification.     Case Report   Non-gynecologic Cytology                          Case: [id number]                                   Authorizing Provider:  Aysha Joaquin MD            Collected:           05/02/2023                    Ordering Location:     [facility]     Received:            05/02/2023 [id number] Ultrasound                                                             Pathologist:           Stuart Kline MD                                                                  Specimens:   A) - Thyroid, Right, Mid Pole                                                                        B) - Thyroid, Right, Mid Pole                                                                        C) - Thyroid, Left, Mid Pole D) - Thyroid, Left, Mid Pole                                                               Final Diagnosis   A.B. Thyroid, Right, Mid Pole ( ThinPrep and smear preparations ):  Benign ([place] Category II) - See note. Satisfactory for evaluation. Note: As reported in the [address] [place] [address] for Reporting Thyroid Cytopathology*, the diagnostic category of "benign/negative for malignancy" carries a 0-3% risk of malignancy being found in subsequent resections (in the few studies of patients with benign FNA results that were followed long-term, a false negative rate of 0-3% was reported), with the usual management being clinical follow-up supplemented by ultrasonography (US) as indicated. Repeat FNA may be indicated for nodules showing significant growth or developing US abnormalities. Ultimately, clinical/imaging correlation for this patient is needed in arriving at the actual management plan. *The [place] System for Reporting Thyroid Cytopathology, Julius Beavers., Ben  Jay Kern.), 2018 (2nd ed.)        C. D. Thyroid, Left, Mid Pole ( ThinPrep and smear preparations ):  Non-diagnostic ([place] Category I) - See note. - Other (obscuring blood, clotting artifact) - see note. Few groups of follicular cells. Those present partially obscured by blood. Note: Specimen processed and examined, but nondiagnostic due to insufficient cellularity. A repeat aspiration should be considered if clinically indicated. Note: As reported in the [address] [place] [address] for Reporting Thyroid Cytopathology*, the diagnostic category of "non-diagnostic" carries a 5-10% risk of malignancy being found in subsequent resections (Non-diagnostic aspirates from solid nodules are associated with a higher risk of malignancy as compared to those showing >/= 50% cystic change and low-risk ultrasonographic features), with the usual management being repeat FNA with ultrasound guidance.  Ultimately, clinical/imaging correlation for this patient is needed in arriving at the actual management plan. *The [place] System for Reporting Thyroid Cytopathology, Bart Edmond.Kan.), 2018 (2nd ed.)   Electronically signed by Cruz Moser MD on 5/4/2023 at  2:11 PM   Note    The treating physician has requested a sample(s) from the above thyroid nodule(s) be sent for analysis by  [facility] Gene Expression  (Afirma GEC), performed by Jenniffer Rossi. [facility] only performs this test on specimen(s) receiving a cytologic diagnosis of [place] Category III or  IV. If such a diagnosis is rendered (above) specimen will be sent to THE [facility] [place], and a separate report with  results of Afirma GEC will follow directly from [facility] (typically taking 14 days). If a cytologic  interpretation other than Temple category III or IV is rendered, specimen will not be sent for Afirma GEC. Intraoperative Consultation    Thyroid, Right Mid Pole FNA On-Site Evaluation:      Adequate. Dr. Gris Hoover  Interpretation performed at [facility], [address] [place] [address]  Thyroid, Left Mid Pole FNA On-Site Evaluation:      Requested additional passes. Dr. Gris Hoover  Interpretation performed at [facility], [address] [place] [address]   Gross Description    A. 20 mL of pale pink, clear fluid, received in CytoLyt   B. 6 slides received (3 Alcohol-Fixed, 3 Diff-Quik)   C. 20 mL of pink, hazy fluid, received in CytoLyt   D. 16 slides received (8 Alcohol-Fixed, 8 Diff-Quik)      04/2018     Addendum by Gretel Forrest MD on 09/18/2019 5:16 PM EDT  THE PREVIOUS REPORT AND SUBSEQUENT ADDENDUM REPORT DICTATED BY DR. Nisha Espinoza    ADDENDUM: Cytology results as follows: Benign follicular nodule [Temple   category 2].         Imaging Results - US THYROID NEEDLE ASP (08/09/2019 1:34 PM EDT)  Impressions   08/09/2019 2:14 PM EDT    Successful ultrasound guided fine needle biopsy of nodule/s in the thyroid gland. The patient is planning on calling their physician in several days to learn the pathology results. Imaging Results - US THYROID NEEDLE ASP (2019 1:34 PM EDT)  Narrative   2019 2:14 PM EDT    KAREN Bain UI: [id number] : 1995 F    [id number]    ULTRASOUND GUIDED THYROID BIOPSY    CLINICAL INDICATION: Abnormal thyroid. COMMENT: The patient's recently identified nodule in the right lobe of the thyroid gland was localized. Nodule measures 0.9 x 0.9 x 0.7 cm. Following informed consent, cleansing and topical anesthesia of the skin, 5 separate 25-gauge fine needle  aspirates were performed under ultrasound guidance. All samples were given to pathology for cytology and were placed in tubes for Afirma study as needed. Material obtained was reviewed immediately by pathology and it was felt adequate samples had been obtained. The patient tolerated the procedure without complication and left the department in good condition with instructions as to how to proceed should  complications develop. 2019  echnique: A sonogram of the thyroid gland was performed assessing   gray-scale appearance and color doppler flow. DISCUSSION: Comparison in May with prior thyroid ultrasound dated   2018. The right thyroid lobe measures 4.7 x 1.7 x 1.3 cm. The left thyroid   lobe measures 4.1 x 1.4 x 1.1 cm. Isthmus measures 4mm in maximum AP   diameter. There is a hypoechoic irregular marginated 1.2 cm mass in   the right superior thyroid previously measuring 1 cm. Given the   irregular margins as well as interval increase in size, this is mildly   suspicious in nature and biopsy is recommended. There is also a stable   1.3 cm hypoechoic well marginated mass in the left superior thyroid. .     No adjacent enlarged lymph nodes are seen. IMPRESSION:   1.  Mildly suspicious 1.2 cm irregularly marginated hypoechoic nodule   in the right superior thyroid for which ultrasound guided FNA is   recommended. 2. Stable 1.3 cm well marginated left superior thyroid nodule. SRU CONSENSUS PANEL RECOMMENDATIONS FOR THE BIOPSY OF THYROID NODULES:     1. Solid of 1cm or larger that have microcalcifications   2. Solid or nodules with coarse calcifications that are 1.5 cm or   larger   3. Mixed solid and cystic or almost entirely cystic with solid mural   component of 2cm or larger   4. Growing nodules   5. Suspicious adenopathy overrides these nodule features and should   prompt FNA of either the lymph node or the thyroid nodule. Impression & Plan:    Problem List Items Addressed This Visit          Endocrine    Acquired hypothyroidism - Primary    Relevant Orders    T4, free    TSH, 3rd generation    Multinodular thyroid    Relevant Medications    spironolactone (ALDACTONE) 50 mg tablet    Other Relevant Orders    US thyroid    Polycystic ovarian syndrome    Relevant Medications    spironolactone (ALDACTONE) 50 mg tablet    Other Relevant Orders    Comprehensive metabolic panel         Orders Placed This Encounter   Procedures    US thyroid     Standing Status:   Future     Standing Expiration Date:   12/14/2027     Scheduling Instructions:      No prep required. Please bring your insurance cards, a form of photo ID and a list of your medications with you. Arrive 15 minutes prior to your appointment time in order to register. To schedule this appointment, please contact Central Scheduling at [phone]. Comprehensive metabolic panel     This is a patient instruction: Patient fasting for 8 hours or longer recommended. Standing Status:   Future     Number of Occurrences:   1     Standing Expiration Date:   12/14/2024    T4, free    TSH, 3rd generation     This is a patient instruction: This test is non-fasting. Please drink two glasses of water morning of bloodwork.           There are no Patient Instructions on file for this visit. 1. Subclinical Hypothyroidism, likely due to Hashimoto:- Currently post partum now. Dose of levothyroxine increased during pregnancy, now down to 50mcg. Clinically feels ok? repeat TFT NOW Adjust dose to aim for normal TSH 2-3 range. Prior to pregnancy on 25 mcg. 2. Multinodular thyroid- Non toxic, s/p FNA in 2019 of right thyroid nodule- neg FNA and s/p FNA right/Left 2.6cm TR4 nodules 05/23, Right nodule benign but left was non diagnostic. Repeat US 11/23 was stable with 2 stable nodules with no change in size. Repeat US in 1 year     3) PCOS:- Dx in 2014, given irregular menses and hirsutism- most likely, PCOS mimickers are ruled out. Menses- on IUD now  Hyperandrogenism- mild symptoms but reports getting worse, would like to try aldactone, start 50mg BID, repeat CMP in 1 month to trend. Metabolic profile- Lipid and A1C 11/23 normal, repeat in 1 year 11/24    RTC in 6 months    Discussed with the patient and all questioned fully answered. She will call me if any problems arise.     Counseled patient on diagnostic results, prognosis, risk and benefit of treatment options, instruction for management, importance of treatment compliance, Risk  factor reduction and impressions    Aamir Kenney MD

## 2023-12-15 ENCOUNTER — PATIENT OUTREACH (OUTPATIENT)
Dept: OBGYN CLINIC | Facility: CLINIC | Age: 28
End: 2023-12-15

## 2023-12-15 NOTE — PROGRESS NOTES
TERRA called [facility] HelpLine, spoke with representative Ms. Moon. Confirmed release form and baby's birth certificate were received by [facility]. Ms. Marisol Eng stated that the only documentation they will require for the [facility] application is a confirmation of patient's unemployment benefit amount per week. Ms. Marisol Eng sent updated notes to the CEVALLOS from today's conversation. TERRA called patient and updated her of same. Patient sent her paystubs via email. TERRA also sent patient another release form to attach to the new SNAP application. TERRA officially submitted the [facility] application.

## 2023-12-21 ENCOUNTER — PATIENT OUTREACH (OUTPATIENT)
Dept: OBGYN CLINIC | Facility: CLINIC | Age: 28
End: 2023-12-21

## 2023-12-21 NOTE — PROGRESS NOTES
TERRA checked [employer] - application is still in process and a list of required documents was sent. Documents must be uploaded by 1/3/24. SNAP phone appointment scheduled for 1/3/24 at 9:00AM - rep to call patient directly. This document list was sent to patient via mail on 12/18. SW also downloaded the PDF and sent it to patient via [employer] in case of any mail delay during the holidays. Signed release form uploaded to [employer].

## 2023-12-22 ENCOUNTER — PATIENT OUTREACH (OUTPATIENT)
Dept: OBGYN CLINIC | Facility: CLINIC | Age: 28
End: 2023-12-22

## 2023-12-22 NOTE — PROGRESS NOTES
Patient sent a reply email yesterday requesting that SW call her today to discuss requirements for SNAP application.     SW called patient to review requested documents. Patient stated that KAYLA Jaffe was not given the option to apply for unemployment (was told by his employer that he is ineligible). Therefore, he did not apply when he was terminated last month. Patient stated she would try calling [facility] Helpline to discuss this with them. After reviewing documents previously provided by patient during initial MA application, TERRA was able to create a condensed list of documents needed - SW sent updated list to patient via email.

## 2023-12-27 ENCOUNTER — PATIENT OUTREACH (OUTPATIENT)
Dept: OBGYN CLINIC | Facility: CLINIC | Age: 28
End: 2023-12-27

## 2023-12-27 NOTE — PROGRESS NOTES
"Late documentation - Patient had called TERRA back on Friday 12/22 and stated she spoke with [facility]. They informed her that they were able to pull FOIRAIDA's child support payments in their system, so they did not need this documentation. They instructed patient/FOB to write a letter explaining the details surrounding FOB's termination and inability to apply for UC. They also requested a screenshot of FOB's account in [employer] as proof of \"bank statement\" replacement.     Today (12/27) TERRA uploaded the documents requested by [facility] that TERRA already had access to from previous MA application. TERRA sent patient an email informing her of same and reminding her of the documents still needed. Patient replied stating she sent the documents to her landlord and requested they be sent back ot her no later than tomorrow. She stated that if she cannot get these back in time, she will submit a copy of her lease instead. She plans to send over the rest of the documents needed as well.   "

## 2023-12-29 ENCOUNTER — PATIENT OUTREACH (OUTPATIENT)
Dept: OBGYN CLINIC | Facility: CLINIC | Age: 28
End: 2023-12-29

## 2023-12-29 NOTE — PROGRESS NOTES
SW sent email to patient requesting documents required by DHS - documents due by 1/3/24 and [facility] office is closed Monday 1/1/24 for holiday. Patient sent back requested documents and SW uploaded them to [employer]. SW attempted to contact the [facility] Help Line this afternoon to confirm they had received the documents and inquire if they needed anything additional, however SW received an automated message stating their office was closed. Call was immediately disconnected. SW called patient and informed her of same. SW to try again on Tuesday 1/2/24 after the holiday.

## 2024-01-02 ENCOUNTER — PATIENT OUTREACH (OUTPATIENT)
Dept: OBGYN CLINIC | Facility: CLINIC | Age: 29
End: 2024-01-02

## 2024-01-02 NOTE — PROGRESS NOTES
TERRA called [facility] this morning - informed by representative that documents submitted by TERRA on 12/29 were received but not yet processed. Representative sent a message to assigned  to ensure she reviewed these documents. Representative also informed SW that a few more documents were needed (tod Gomez's SSN/card, patient's bank statement, patient's phone bill, patient's vehicle registration, patient's life insurance statement, FOB's vehicle registration, & signed rights and responsibilities form). TERRA sent this list to patient via email. TERRA then called patient to discuss above - patient stated she will get these documents to  as soon as possible.     Patient sent reply email with necessary documents. Patient stated she does not have access to her life insurance policy, but would write a letter stating she does not have further information for this policy and send it to TERRA. TERRA uploaded documents into [employer]. TERRA called patient back to let her know that the documents were uploaded and reminded her of her interview appointment tomorrow via phone at 9:00am.

## 2024-01-03 ENCOUNTER — PATIENT OUTREACH (OUTPATIENT)
Dept: OBGYN CLINIC | Facility: CLINIC | Age: 29
End: 2024-01-03

## 2024-01-03 NOTE — PROGRESS NOTES
"Patient called TERRA and stated she did not receive a call from [facility] for an interview. She called the [facility] Helpline herself and waited on hold for 1.5 hours before being connected to a representative. She states that the representative could not explain why patient was told she would have an interview because DHS is no longer doing phone interviews. The representative stated patient will likely not be approved for expedited SNAP or Cash Assistance and to wait and call back in a few days to check the status of the rest of her application.     SW also checked [employer] and the applications are all still marked as \"In Process\". SW emailed patient informing her of same. SW will continue to check status of application over the next few days for changes.   "

## 2024-01-08 ENCOUNTER — PATIENT OUTREACH (OUTPATIENT)
Dept: OBGYN CLINIC | Facility: CLINIC | Age: 29
End: 2024-01-08

## 2024-01-08 NOTE — PROGRESS NOTES
"SW checked [employer] again this morning for status on SNAP/LIHEAP/Cash Assistance/MA application. Status still \"in process\". No new documents listed under \"requested verification\". SW sent email to patient updating her of same.     One week from today (1/15/24) will be one month saúl from date of initial submission.   "

## 2024-01-11 ENCOUNTER — PATIENT OUTREACH (OUTPATIENT)
Dept: OBGYN CLINIC | Facility: CLINIC | Age: 29
End: 2024-01-11

## 2024-01-11 NOTE — PROGRESS NOTES
Patient called SW today to state that a DHS representative called her today to stated that she would not be approved for EBT based on their interview today, however she did believe they would qualify for SNAP. Representative provided patient with her email address to provide additional documentation needed to complete process. Representative could not provide a number for how much they will receive in SNAP benefits. SW checked [employer]ass which still states application is pending.     Since she is not breastfeeding, patient was informed she will no longer receive benefits for herself for WIC after 6 months but baby will continue to get support for infant food and formula after 6 months. Patient also is still considering therapy but is unsure because she is feeling better on current medication. SW and patient to review this again next week.

## 2024-01-15 ENCOUNTER — PATIENT OUTREACH (OUTPATIENT)
Dept: OBGYN CLINIC | Facility: CLINIC | Age: 29
End: 2024-01-15

## 2024-01-15 NOTE — PROGRESS NOTES
SW checked [employer] application again today as it is officially one month from original submission date. Application still in process - likely due to [facility] closure for MLK holiday. TERRA updated patient via email, who thanked TERRA for update. SW will check back later this week and update patient accordingly.

## 2024-01-17 ENCOUNTER — PATIENT OUTREACH (OUTPATIENT)
Dept: OBGYN CLINIC | Facility: CLINIC | Age: 29
End: 2024-01-17

## 2024-01-19 ENCOUNTER — PATIENT OUTREACH (OUTPATIENT)
Dept: OBGYN CLINIC | Facility: CLINIC | Age: 29
End: 2024-01-19

## 2024-01-19 NOTE — PROGRESS NOTES
Patient sent TERRA an email stating she spoke with [facility] who confirmed her MA is still active and will be until her renewal date in July. She has not yet received letters about [facility] approval or KAYLA's MA, but will continue to check her mail.     SW called patient today for an update on therapy. Patient stated she called [facility] but was transferred several times without being offered to schedule for services. SW offered to call on patient's behalf or look into other offices - patient declined stating she will look into it herself and get back to TERRA if she is having trouble. TERRA offered to check back in with patient in two weeks, patient in agreement with this plan.

## 2024-02-02 ENCOUNTER — PATIENT OUTREACH (OUTPATIENT)
Dept: OBGYN CLINIC | Facility: CLINIC | Age: 29
End: 2024-02-02

## 2024-02-02 NOTE — PROGRESS NOTES
TERRA called patient to inquire if she is still interested in assistance with finding a therapist. Patient reports she has decided to not pursue a therapist at this time as she is trying not to accrue more medical bills. She was informed that she can no longer file for unemployment after next Sunday and they are no longer granting extensions. She does have a second interview for a job this afternoon, but they have not yet mentioned how much it will pay. She is unsure if she will end up pursuing two part-time jobs.    TERRA encouraged patient to reach back out to TERRA if she decides she would like further assistance with therapy.

## 2024-02-05 ENCOUNTER — OFFICE VISIT (OUTPATIENT)
Dept: FAMILY MEDICINE CLINIC | Facility: CLINIC | Age: 29
End: 2024-02-05
Payer: COMMERCIAL

## 2024-02-05 VITALS
SYSTOLIC BLOOD PRESSURE: 110 MMHG | HEART RATE: 88 BPM | WEIGHT: 169.4 LBS | BODY MASS INDEX: 30.02 KG/M2 | DIASTOLIC BLOOD PRESSURE: 80 MMHG | HEIGHT: 63 IN | OXYGEN SATURATION: 98 % | RESPIRATION RATE: 17 BRPM | TEMPERATURE: 99 F

## 2024-02-05 DIAGNOSIS — E03.9 ACQUIRED HYPOTHYROIDISM: ICD-10-CM

## 2024-02-05 DIAGNOSIS — F41.9 ANXIETY: ICD-10-CM

## 2024-02-05 DIAGNOSIS — F33.41 RECURRENT MAJOR DEPRESSIVE DISORDER, IN PARTIAL REMISSION (HCC): Primary | ICD-10-CM

## 2024-02-05 PROBLEM — Z36.89 ENCOUNTER FOR OTHER SPECIFIED ANTENATAL SCREENING: Status: RESOLVED | Noted: 2023-08-02 | Resolved: 2024-02-05

## 2024-02-05 PROBLEM — Z36.9 ANTENATAL SCREENING ENCOUNTER: Status: RESOLVED | Noted: 2023-04-25 | Resolved: 2024-02-05

## 2024-02-05 PROBLEM — Z3A.35 35 WEEKS GESTATION OF PREGNANCY: Status: RESOLVED | Noted: 2023-03-29 | Resolved: 2024-02-05

## 2024-02-05 PROBLEM — O99.283 THYROID DISEASE DURING PREGNANCY IN THIRD TRIMESTER: Status: RESOLVED | Noted: 2023-03-29 | Resolved: 2024-02-05

## 2024-02-05 PROBLEM — E07.9 THYROID DISEASE DURING PREGNANCY IN THIRD TRIMESTER: Status: RESOLVED | Noted: 2023-03-29 | Resolved: 2024-02-05

## 2024-02-05 PROBLEM — N76.0 ACUTE VAGINITIS: Status: RESOLVED | Noted: 2023-04-25 | Resolved: 2024-02-05

## 2024-02-05 PROCEDURE — 99214 OFFICE O/P EST MOD 30 MIN: CPT | Performed by: FAMILY MEDICINE

## 2024-02-05 RX ORDER — BUPROPION HYDROCHLORIDE 150 MG/1
150 TABLET ORAL EVERY MORNING
Qty: 30 TABLET | Refills: 5 | Status: SHIPPED | OUTPATIENT
Start: 2024-02-05 | End: 2024-08-03

## 2024-02-05 NOTE — PROGRESS NOTES
Assessment/Plan:  Problem List Items Addressed This Visit        Endocrine    Acquired hypothyroidism     The patient will continue with the current dose of her levothyroxine.  She will continue to follow-up with her endocrinologist as scheduled.            Other    Anxiety     The patient will continue on the current dose of her duloxetine.  We will add on the Wellbutrin as ordered to help with breakthrough depression symptoms.  We will see her back as scheduled.         Relevant Medications    DULoxetine HCl 60 MG CSDR    Recurrent major depressive disorder, in partial remission (HCC) - Primary     The patient is getting relief from the duloxetine but is still having some breakthrough depression symptoms.  She feels the medication could be stronger.  We discussed how we cannot titrate up on the dose any further but we will add on bupropion for combination therapy to treat her depression.  She will let us know if she has any worsening symptoms.  We will follow-up with her as scheduled.         Relevant Medications    buPROPion (WELLBUTRIN XL) 150 mg 24 hr tablet    DULoxetine HCl 60 MG CSDR         Return in about 1 month (around 3/5/2024) for Recheck.   I spent 15 minutes during the visit reviewing the history from the patient, performing the examination, discussing the findings with the patient, providing counseling and education, and making a plan. I spent 15 minutes ordering referrals and testing and documenting.    Subjective:   Chief Complaint   Patient presents with   • Follow-up     Medication check--Cymbalta        Patient ID: Lupis Kidd is a 28 y.o. female presents today for routine follow-up of her depression and anxiety..  Lupis Kidd is a 28 y.o. female who presents today for follow-up of her depression and her Cymbalta therapy.  She is here with her daughter Patricia- 5 months. The patient is not nursing.  She is on the Cymbalta for her depression- she was on it years ago and stopped it for  a while and was soing better, but then developed postpartum depression and has been back on the medication.  She is .    She easton have breakdowns at time and will have crying spells -she stressed because she is doing everything.  There are occasional anxiety attacks. She fees that she has always had decreased interest.    There was only one medication that she has been on for the depression- Cymbalta.  .    She has been sleeping okay.  There are no suicidal ideations.    The patient denies any chest pain, shortness of breath, or palpitations.  There is no edema.  There are no headaches or visual changes.  There is no lightheadedness, dizziness, or fainting spells.  The patient currently denies any nausea, vomiting, or GERD symptoms.  she has normal bowel movements and normal urine output.  she has a normal appetite.  She is sleeping well.    She is returning to work tomorrow.   She has a lot of stress.      Depression  This is a chronic problem. The current episode started more than 1 year ago. The problem occurs constantly. The problem has been unchanged. Pertinent negatives include no abdominal pain, anorexia, arthralgias, change in bowel habit, chest pain, chills, congestion, coughing, diaphoresis, fatigue, fever, headaches, joint swelling, myalgias, nausea, neck pain, numbness, rash, sore throat, swollen glands, urinary symptoms, vertigo, visual change, vomiting or weakness.     The following portions of the patient's history were reviewed and updated as appropriate: allergies, current medications, past family history, past medical history, past social history, past surgical history and problem list.  Patient Active Problem List   Diagnosis   • Anxiety   • Recurrent major depressive disorder, in partial remission (HCC)   • Acquired hypothyroidism   • Polycystic ovarian syndrome   • Herpes simplex infection of genitourinary system   • Human papilloma virus   • History of cervical LEEP biopsy affecting care  of mother, antepartum, second trimester   • Genital herpes simplex virus (HSV) infection in mother affecting pregnancy   • Multinodular thyroid   • Factor 5 Leiden mutation, heterozygous (HCC)   • Gastroesophageal reflux in pregnancy   • Encounter for insertion of mirena IUD   • IUD check up     Past Medical History:   Diagnosis Date   • Anxiety and depression     previously on Cymbalta, stopped before 2022 due to cost of visits. Currently no medication and self-manage.   • Factor 5 Leiden mutation, heterozygous (HCC)    • Genital herpes     Last out break per pt months ago (23). On Valtrex only w/outbreak.   • History of loop electrical excision procedure (LEEP) 2019   • Human papilloma virus    • Hypothyroid     Currently on Levothyroxine 25mg and in the process of finding a new endocrinologist. Does not remember when last TSH check.   • Miscarriage 20    This was my first pregnancy. The miscarriage started Friday, 22   • PCOS (polycystic ovarian syndrome)     Currently on metformin 500 mg x2 daily.   •  delivery     Precipitously delivered at [facility]w0d in [facility] upon arrival to hospital     Past Surgical History:   Procedure Laterality Date   • CERVICAL BIOPSY  W/ LOOP ELECTRODE EXCISION     • US GUIDED THYROID BIOPSY  2023   • US GUIDED THYROID BIOPSY  2019   • WISDOM TOOTH EXTRACTION       No Known Allergies  Family History   Problem Relation Age of Onset   • Thyroid disease Mother    • Hypothyroidism Mother    • Depression Father    • Cancer Father         Multiple Myloma   • Anxiety disorder Father    • Deep vein thrombosis Father      Social History     Socioeconomic History   • Marital status: Single     Spouse name: Not on file   • Number of children: Not on file   • Years of education: Not on file   • Highest education level: Not on file   Occupational History   • Not on file   Tobacco Use   • Smoking status: Former     Current packs/day: 0.25     Average  packs/day: 0.3 packs/day for 2.0 years (0.5 ttl pk-yrs)     Types: Cigarettes     Passive exposure: Never   • Smokeless tobacco: Never   • Tobacco comments:     As soon as I found out that I was pregnant, I completely stopped smoking.   Vaping Use   • Vaping status: Never Used   Substance and Sexual Activity   • Alcohol use: Yes     Comment: I rarely drink   • Drug use: Never   • Sexual activity: Not Currently     Partners: Male     Birth control/protection: None     Comment: no new partner in past year   Other Topics Concern   • Not on file   Social History Narrative   • Not on file     Social Determinants of Health     Financial Resource Strain: Not on file   Food Insecurity: Not on file   Transportation Needs: Not on file   Physical Activity: Not on file   Stress: Not on file   Social Connections: Not on file   Intimate Partner Violence: Not At Risk (11/17/2023)    Humiliation, Afraid, Rape, and Kick questionnaire    • Fear of Current or Ex-Partner: No    • Emotionally Abused: No    • Physically Abused: No    • Sexually Abused: No   Housing Stability: Not on file     Current Outpatient Medications on File Prior to Visit   Medication Sig Dispense Refill   • Levonorgestrel (MIRENA) 20 MCG/DAY IUD 1 each by Intrauterine route once     • levothyroxine 50 mcg tablet TAKE 1 TABLET BY MOUTH ONCE DAILY 90 tablet 1   • spironolactone (ALDACTONE) 50 mg tablet Take 1 tablet (50 mg total) by mouth 2 (two) times a day 200 tablet 1   • valACYclovir (VALTREX) 500 mg tablet if needed Takes when needed       No current facility-administered medications on file prior to visit.     Review of Systems   Constitutional: Negative.  Negative for chills, diaphoresis, fatigue and fever.   HENT: Negative.  Negative for congestion and sore throat.    Eyes: Negative.    Respiratory: Negative.  Negative for cough.    Cardiovascular: Negative.  Negative for chest pain.   Gastrointestinal: Negative.  Negative for abdominal pain, anorexia,  "change in bowel habit, nausea and vomiting.   Endocrine: Negative.    Genitourinary: Negative.    Musculoskeletal: Negative.  Negative for arthralgias, joint swelling, myalgias and neck pain.   Skin: Negative.  Negative for rash.   Allergic/Immunologic: Negative.    Neurological: Negative.  Negative for vertigo, weakness, numbness and headaches.   Hematological: Negative.    Psychiatric/Behavioral:  Positive for depression. Negative for agitation, behavioral problems, confusion, decreased concentration, dysphoric mood, hallucinations, self-injury, sleep disturbance and suicidal ideas. The patient is nervous/anxious. The patient is not hyperactive.        Objective:  Vitals:    02/05/24 0955   BP: 110/80   BP Location: Right arm   Patient Position: Sitting   Cuff Size: Large   Pulse: 88   Resp: 17   Temp: 99 °F (37.2 °C)   TempSrc: Tympanic   SpO2: 98%   Weight: 76.8 kg (169 lb 6.4 oz)   Height: 5' 2.5\" (1.588 m)     Body mass index is 30.49 kg/m².     Physical Exam  Constitutional:       Appearance: She is well-developed.   HENT:      Head: Normocephalic and atraumatic.      Right Ear: Tympanic membrane, ear canal and external ear normal.      Left Ear: Tympanic membrane, ear canal and external ear normal.      Nose: Nose normal.      Mouth/Throat:      Mouth: Mucous membranes are moist.      Pharynx: No oropharyngeal exudate.   Eyes:      Extraocular Movements: Extraocular movements intact.      Conjunctiva/sclera: Conjunctivae normal.      Pupils: Pupils are equal, round, and reactive to light.   Neck:      Thyroid: No thyromegaly.      Vascular: No JVD.      Trachea: No tracheal deviation.   Cardiovascular:      Rate and Rhythm: Normal rate and regular rhythm.      Pulses: Normal pulses.      Heart sounds: Normal heart sounds. No murmur heard.     No friction rub. No gallop.   Pulmonary:      Effort: Pulmonary effort is normal. No respiratory distress.      Breath sounds: Normal breath sounds. No stridor. No " wheezing or rales.   Chest:      Chest wall: No tenderness.   Abdominal:      General: Bowel sounds are normal. There is no distension.      Palpations: Abdomen is soft. There is no mass.      Tenderness: There is no abdominal tenderness. There is no guarding or rebound.   Musculoskeletal:         General: No swelling, tenderness, deformity or signs of injury. Normal range of motion.      Cervical back: Normal range of motion.      Right lower leg: No edema.      Left lower leg: No edema.   Lymphadenopathy:      Cervical: No cervical adenopathy.   Skin:     General: Skin is warm and dry.   Neurological:      General: No focal deficit present.      Mental Status: She is alert and oriented to person, place, and time.      Cranial Nerves: No cranial nerve deficit.      Motor: No abnormal muscle tone.      Coordination: Coordination normal.      Deep Tendon Reflexes: Reflexes are normal and symmetric. Reflexes normal.             Depression Screening and Follow-up Plan: Patient was screened for depression during today's encounter. They screened negative with a PHQ-9 score of 3.

## 2024-02-06 NOTE — ASSESSMENT & PLAN NOTE
The patient is getting relief from the duloxetine but is still having some breakthrough depression symptoms.  She feels the medication could be stronger.  We discussed how we cannot titrate up on the dose any further but we will add on bupropion for combination therapy to treat her depression.  She will let us know if she has any worsening symptoms.  We will follow-up with her as scheduled.

## 2024-02-06 NOTE — ASSESSMENT & PLAN NOTE
The patient will continue with the current dose of her levothyroxine.  She will continue to follow-up with her endocrinologist as scheduled.

## 2024-02-06 NOTE — ASSESSMENT & PLAN NOTE
The patient will continue on the current dose of her duloxetine.  We will add on the Wellbutrin as ordered to help with breakthrough depression symptoms.  We will see her back as scheduled.

## 2024-02-13 ENCOUNTER — OFFICE VISIT (OUTPATIENT)
Dept: URGENT CARE | Facility: CLINIC | Age: 29
End: 2024-02-13
Payer: COMMERCIAL

## 2024-02-13 VITALS
OXYGEN SATURATION: 99 % | HEART RATE: 89 BPM | RESPIRATION RATE: 18 BRPM | BODY MASS INDEX: 29.95 KG/M2 | SYSTOLIC BLOOD PRESSURE: 108 MMHG | WEIGHT: 169 LBS | HEIGHT: 63 IN | TEMPERATURE: 97.5 F | DIASTOLIC BLOOD PRESSURE: 62 MMHG

## 2024-02-13 DIAGNOSIS — M77.11 LATERAL EPICONDYLITIS OF RIGHT ELBOW: Primary | ICD-10-CM

## 2024-02-13 PROCEDURE — 99213 OFFICE O/P EST LOW 20 MIN: CPT | Performed by: FAMILY MEDICINE

## 2024-02-13 NOTE — PROGRESS NOTES
[facility]        NAME: Lupis Kidd is a 28 y.o. female  : 1995    MRN: [id number]  DATE: 2024  TIME: 2:22 PM    Assessment and Plan   No primary diagnosis found.  No diagnosis found.      Patient Instructions       Follow up with PCP in 3-5 days.  Proceed to  ER if symptoms worsen.    Chief Complaint     Chief Complaint   Patient presents with    Numbness     Pt presents with right sided tingling from elbow to thumb x 1.5 weeks.  No recent trauma.  Just started going back to work x 1 week ago after having a child x 6 months ago.           History of Present Illness       28-year-old female presenting with pain from her right arm down to the right hand for the past 1 to 2 weeks.  She also reports associated numbness to the right thumb.  Denies any history of trauma.  Denies any joint swelling warmth or crepitus.        Review of Systems   Review of Systems   Constitutional: Negative.    HENT: Negative.     Eyes: Negative.    Respiratory: Negative.     Cardiovascular: Negative.    Gastrointestinal: Negative.    Endocrine: Negative.    Genitourinary: Negative.    Musculoskeletal:  Positive for arthralgias.   Skin: Negative.    Allergic/Immunologic: Negative.    Neurological:  Positive for numbness.   Hematological: Negative.    Psychiatric/Behavioral: Negative.           Current Medications       Current Outpatient Medications:     buPROPion (WELLBUTRIN XL) 150 mg 24 hr tablet, Take 1 tablet (150 mg total) by mouth every morning, Disp: 30 tablet, Rfl: 5    DULoxetine HCl 60 MG CSDR, Take 1 tablet by mouth in the morning, Disp: 30 capsule, Rfl: 5    Levonorgestrel (MIRENA) 20 MCG/DAY IUD, 1 each by Intrauterine route once, Disp: , Rfl:     levothyroxine 50 mcg tablet, TAKE 1 TABLET BY MOUTH ONCE DAILY, Disp: 90 tablet, Rfl: 1    spironolactone (ALDACTONE) 50 mg tablet, Take 1 tablet (50 mg total) by mouth 2 (two) times a day, Disp: 200 tablet, Rfl: 1    valACYclovir (VALTREX) 500  "mg tablet, if needed Takes when needed, Disp: , Rfl:     Current Allergies     Allergies as of 2024    (No Known Allergies)            The following portions of the patient's history were reviewed and updated as appropriate: allergies, current medications, past family history, past medical history, past social history, past surgical history and problem list.     Past Medical History:   Diagnosis Date    Anxiety and depression     previously on Cymbalta, stopped before 2022 due to cost of visits. Currently no medication and self-manage.    Factor 5 Leiden mutation, heterozygous (HCC)     Genital herpes     Last out break per pt months ago (23). On Valtrex only w/outbreak.    History of loop electrical excision procedure (LEEP) 2019    Human papilloma virus     Hypothyroid     Currently on Levothyroxine 25mg and in the process of finding a new endocrinologist. Does not remember when last TSH check.    Miscarriage 22    This was my first pregnancy. The miscarriage started Friday, 22    PCOS (polycystic ovarian syndrome)     Currently on metformin 500 mg x2 daily.     delivery     Precipitously delivered at 35w0d in [facility] upon arrival to hospital       Past Surgical History:   Procedure Laterality Date    CERVICAL BIOPSY  W/ LOOP ELECTRODE EXCISION      US GUIDED THYROID BIOPSY  2023    US GUIDED THYROID BIOPSY  2019    WISDOM TOOTH EXTRACTION         Family History   Problem Relation Age of Onset    Thyroid disease Mother     Hypothyroidism Mother     Depression Father     Cancer Father         Multiple Myloma    Anxiety disorder Father     Deep vein thrombosis Father          Medications have been verified.        Objective   /62   Pulse 89   Temp 97.5 °F (36.4 °C)   Resp 18   Ht 5' 3\" (1.6 m)   Wt 76.7 kg (169 lb)   LMP 2024   SpO2 99%   BMI 29.94 kg/m²   Patient's last menstrual period was 2024.       Physical Exam     Physical " Exam  Vitals and nursing note reviewed.   Constitutional:       Appearance: She is well-developed.   HENT:      Head: Normocephalic.      Nose: Nose normal.   Eyes:      Pupils: Pupils are equal, round, and reactive to light.   Cardiovascular:      Rate and Rhythm: Normal rate.   Pulmonary:      Effort: Pulmonary effort is normal.   Abdominal:      General: Abdomen is flat.   Musculoskeletal:         General: Tenderness present. No swelling or signs of injury. Normal range of motion.      Right elbow: Normal range of motion. Tenderness present in lateral epicondyle.      Cervical back: Normal range of motion.   Skin:     General: Skin is warm and dry.   Neurological:      Mental Status: She is alert and oriented to person, place, and time.

## 2024-02-21 ENCOUNTER — ANNUAL EXAM (OUTPATIENT)
Dept: OBGYN CLINIC | Facility: CLINIC | Age: 29
End: 2024-02-21
Payer: COMMERCIAL

## 2024-02-21 VITALS
WEIGHT: 169 LBS | BODY MASS INDEX: 29.95 KG/M2 | HEIGHT: 63 IN | SYSTOLIC BLOOD PRESSURE: 104 MMHG | DIASTOLIC BLOOD PRESSURE: 80 MMHG

## 2024-02-21 DIAGNOSIS — Z01.419 ENCOUNTER FOR ANNUAL ROUTINE GYNECOLOGICAL EXAMINATION: Primary | ICD-10-CM

## 2024-02-21 PROCEDURE — 99395 PREV VISIT EST AGE 18-39: CPT | Performed by: NURSE PRACTITIONER

## 2024-02-21 RX ORDER — DULOXETIN HYDROCHLORIDE 60 MG/1
60 CAPSULE, DELAYED RELEASE ORAL EVERY MORNING
COMMUNITY
Start: 2024-02-17

## 2024-02-21 NOTE — PROGRESS NOTES
[facility] [address], [address], [place] [address]    ASSESSMENT/PLAN: Lupis Kidd is a 28 y.o.  who presents for annual gynecologic exam.    Encounter for routine gynecologic examination  - Routine well woman exam completed today.  - Cervical Cancer Screening: Current ASCCP Guidelines reviewed. Last Pap: 2021 Nil. History of abnormal: yes, treated with LEEP  - HPV Vaccination status: unknown  - STI screening offered including HIV testing: Declined  - Contraceptive counseling discussed.  Current contraception: IUD:   - The following were reviewed in today's visit: breast self exam, exercise, and healthy diet    Additional problems addressed during this visit:  None    CC:  Annual Gynecologic Examination    HPI: Lupis Kidd is a 28 y.o.  who presents for annual gynecologic examination. She is doing well and has no complaints. She has depression and is on medication, managed by her PCP and reports feeling stable. She is happy with Mirena, inserted last .  She reports irregular menses on Mirena.    ROS: Negative except as noted in HPI    Patient's last menstrual period was 2024.       She  reports being sexually active and has had partner(s) who are male. She reports using the following methods of birth control/protection: None and I.U.D..       The following portions of the patient's history were reviewed and updated as appropriate:   Past Medical History:   Diagnosis Date    Anxiety and depression     previously on Cymbalta, stopped before 2022 due to cost of visits. Currently no medication and self-manage.    Depression     Factor 5 Leiden mutation, heterozygous (HCC)     Genital herpes     Last out break per pt months ago (23). On Valtrex only w/outbreak.    History of loop electrical excision procedure (LEEP) 2019    Human papilloma virus     Hypothyroid     Currently on Levothyroxine 25mg and in the process of finding a new  endocrinologist. Does not remember when last TSH check.    Miscarriage 22    This was my first pregnancy. The miscarriage started Friday, 22    PCOS (polycystic ovarian syndrome)     Currently on metformin 500 mg x2 daily.     delivery     Precipitously delivered at 35w0d in [facility] upon arrival to hospital     Past Surgical History:   Procedure Laterality Date    CERVICAL BIOPSY  W/ LOOP ELECTRODE EXCISION      US GUIDED THYROID BIOPSY  2023    US GUIDED THYROID BIOPSY  2019    WISDOM TOOTH EXTRACTION       Family History   Problem Relation Age of Onset    Thyroid disease Mother     Hypothyroidism Mother     Depression Father     Cancer Father         Multiple Myloma    Anxiety disorder Father     Deep vein thrombosis Father      Social History     Socioeconomic History    Marital status: Single     Spouse name: None    Number of children: None    Years of education: None    Highest education level: None   Occupational History    None   Tobacco Use    Smoking status: Former     Current packs/day: 0.00     Average packs/day: 0.3 packs/day for 2.0 years (0.5 ttl pk-yrs)     Types: Cigarettes     Quit date: 2023     Years since quittin.0     Passive exposure: Never    Smokeless tobacco: Never    Tobacco comments:     As soon as I found out that I was pregnant, I completely stopped smoking.   Vaping Use    Vaping status: Never Used   Substance and Sexual Activity    Alcohol use: Not Currently     Comment: I rarely drink    Drug use: Never    Sexual activity: Yes     Partners: Male     Birth control/protection: None, I.U.D.     Comment: no new partner in past year   Other Topics Concern    None   Social History Narrative    None     Social Determinants of Health     Financial Resource Strain: Not on file   Food Insecurity: Not on file   Transportation Needs: Not on file   Physical Activity: Not on file   Stress: Not on file   Social Connections: Not on file   Intimate Partner  "Violence: Not At Risk (11/17/2023)    Humiliation, Afraid, Rape, and Kick questionnaire     Fear of Current or Ex-Partner: No     Emotionally Abused: No     Physically Abused: No     Sexually Abused: No   Housing Stability: Not on file     Outpatient Medications Marked as Taking for the 2/21/24 encounter (Annual Exam) with CHCUHO Wakefield   Medication    buPROPion (WELLBUTRIN XL) 150 mg 24 hr tablet    DULoxetine (CYMBALTA) 60 mg delayed release capsule    Levonorgestrel (MIRENA) 20 MCG/DAY IUD    levothyroxine 50 mcg tablet    spironolactone (ALDACTONE) 50 mg tablet    valACYclovir (VALTREX) 500 mg tablet     No Known Allergies        Objective:  /80 (BP Location: Right arm, Patient Position: Sitting, Cuff Size: Standard)   Ht 5' 3\" (1.6 m)   Wt 76.7 kg (169 lb)   LMP 01/16/2024   BMI 29.94 kg/m²        Chaperone present? No.    General Appearance: alert and oriented, in no acute distress.   Neck/Thyroid: No thyromegaly, no thyroid nodules.  Respiratory: Unlabored breathing.  Cardiovascular:  No peripheral edema.  Abdomen: Soft, non-tender, non-distended, no masses, no rebound or guarding.  Breast Exam: No dimpling, nipple retraction or discharge. No lumps or masses. No axillary or supraclavicular nodes.   Pelvic:       External genitalia: Normal appearance, no abnormal pigmentation, no lesions or masses. Normal Bartholin's and Dos Palos Y's.      Urinary system: Urethral meatus normal, bladder non-tender.      Vaginal: normal mucosa without prolapse or lesions. Normal-appearing physiologic discharge.      Cervix: Normal-appearing, well-epithelialized, no gross lesions or masses. No cervical motion tenderness. IUD strings short but visible      Adnexa: No adnexal masses or tenderness noted.      Uterus: Normal-sized, regular contour, midline, mobile, no uterine tenderness.  Extremities: Normal range of motion. Warm, well-perfused, non-tender.   Skin: normal, no rash or abnormalities  Neurologic: alert, " oriented x3  Psychiatric: Appropriate affect, mood stable, cooperative with exam.        CHUCHO Wakefield  2/21/2024 8:07 AM

## 2024-02-23 LAB
CYTOLOGIST CVX/VAG CYTO: NORMAL
DX ICD CODE: NORMAL
OTHER STN SPEC: NORMAL
OTHER STN SPEC: NORMAL
PATH REPORT.FINAL DX SPEC: NORMAL
SL AMB NOTE:: NORMAL
SL AMB SPECIMEN ADEQUACY: NORMAL
SL AMB TEST METHODOLOGY: NORMAL

## 2024-03-01 LAB — HPV I/H RISK 4 DNA CVX QL PROBE+SIG AMP: NEGATIVE

## 2024-03-06 ENCOUNTER — OFFICE VISIT (OUTPATIENT)
Dept: FAMILY MEDICINE CLINIC | Facility: CLINIC | Age: 29
End: 2024-03-06
Payer: COMMERCIAL

## 2024-03-06 VITALS
HEIGHT: 63 IN | HEART RATE: 68 BPM | BODY MASS INDEX: 30.72 KG/M2 | WEIGHT: 173.4 LBS | TEMPERATURE: 98.9 F | DIASTOLIC BLOOD PRESSURE: 80 MMHG | SYSTOLIC BLOOD PRESSURE: 124 MMHG | RESPIRATION RATE: 18 BRPM | OXYGEN SATURATION: 97 %

## 2024-03-06 DIAGNOSIS — F41.9 ANXIETY: ICD-10-CM

## 2024-03-06 DIAGNOSIS — F33.41 RECURRENT MAJOR DEPRESSIVE DISORDER, IN PARTIAL REMISSION (HCC): Primary | ICD-10-CM

## 2024-03-06 PROCEDURE — 99213 OFFICE O/P EST LOW 20 MIN: CPT | Performed by: FAMILY MEDICINE

## 2024-03-06 NOTE — PROGRESS NOTES
Assessment/Plan:  Problem List Items Addressed This Visit        Other    Anxiety     The patient's anxiety symptoms are stable on the current medication.  We will maintain her on the same dose.  We will see her back as scheduled.         Recurrent major depressive disorder, in partial remission (HCC) - Primary     The patient is doing much better with the addition of the bupropion.  Her depression symptoms are well-controlled.  We will maintain her on this current dose and we will continue to monitor.  We will see her back as scheduled.            Depression Screening Follow-up Plan: Patient's depression screening was positive with a PHQ-2 score of . Their PHQ-9 score was 12. Patient assessed for underlying major depression. They have no active suicidal ideations. Brief counseling provided and recommend additional follow-up/re-evaluation next office visit.  Return in about 3 months (around 6/6/2024) for Recheck.   I spent 15 minutes during the visit reviewing the history from the patient, performing the examination, discussing the findings with the patient, providing counseling and education, and making a plan. I spent 15 minutes ordering referrals and testing and documenting.    Subjective:   Chief Complaint   Patient presents with   • Follow-up     Depression        Patient ID: Lupis Kidd is a 28 y.o. female presents today for routine follow-up.  Lupis Kidd is a 28 y.o. female who presents today for follow-up of her depression and anxiety.  At her last visit, we had added on the bupropion  mg to her duloxetine to better improve her depression symptoms.  She has seen an improvement since making this change.  She is tolerating the medication.  Her  has noticed a significant improvement.  She is tired but it is with having to take care of her baby.  She wishes to remain on the current dose of the medication.    She has been feeling well on the medication and she feels better.   There are  no panic attacks.  She is sleeping as best as she can.   There are no side effects with the medication.  The patient denies any chest pain, shortness of breath, or palpitations.  There is no edema.  There are no headaches or visual changes.  There is no lightheadedness, dizziness, or fainting spells.    The is some increased gas.      Anxiety  Presents for follow-up visit. Patient reports no chest pain, compulsions, confusion, decreased concentration, depressed mood, dizziness, dry mouth, excessive worry, feeling of choking, hyperventilation, impotence, insomnia, irritability, malaise, muscle tension, nausea, nervous/anxious behavior, obsessions, palpitations, panic, restlessness, shortness of breath or suicidal ideas.         The following portions of the patient's history were reviewed and updated as appropriate: allergies, current medications, past family history, past medical history, past social history, past surgical history and problem list.  Patient Active Problem List   Diagnosis   • Anxiety   • Recurrent major depressive disorder, in partial remission (HCC)   • Acquired hypothyroidism   • Polycystic ovarian syndrome   • Herpes simplex infection of genitourinary system   • Human papilloma virus   • History of cervical LEEP biopsy affecting care of mother, antepartum, second trimester   • Genital herpes simplex virus (HSV) infection in mother affecting pregnancy   • Multinodular thyroid   • Factor 5 Leiden mutation, heterozygous (HCC)   • Gastroesophageal reflux in pregnancy   • Encounter for insertion of mirena IUD   • IUD check up   • Lateral epicondylitis of right elbow     Past Medical History:   Diagnosis Date   • Anxiety and depression     previously on Cymbalta, stopped before fall 2022 due to cost of visits. Currently no medication and self-manage.   • Depression    • Factor 5 Leiden mutation, heterozygous (HCC)    • Genital herpes     Last out break per pt months ago (02/27/23). On Valtrex only  w/outbreak.   • History of loop electrical excision procedure (LEEP) 2019   • Human papilloma virus    • Hypothyroid     Currently on Levothyroxine 25mg and in the process of finding a new endocrinologist. Does not remember when last TSH check.   • Miscarriage 22    This was my first pregnancy. The miscarriage started Friday, 22   • PCOS (polycystic ovarian syndrome)     Currently on metformin 500 mg x2 daily.   •  delivery     Precipitously delivered at 35w0d in [facility] upon arrival to hospital     Past Surgical History:   Procedure Laterality Date   • CERVICAL BIOPSY  W/ LOOP ELECTRODE EXCISION     • US GUIDED THYROID BIOPSY  2023   • US GUIDED THYROID BIOPSY  2019   • WISDOM TOOTH EXTRACTION       No Known Allergies  Family History   Problem Relation Age of Onset   • Thyroid disease Mother    • Hypothyroidism Mother    • Depression Father    • Cancer Father         Multiple Myloma   • Anxiety disorder Father    • Deep vein thrombosis Father      Social History     Socioeconomic History   • Marital status: Single     Spouse name: Not on file   • Number of children: Not on file   • Years of education: Not on file   • Highest education level: Not on file   Occupational History   • Not on file   Tobacco Use   • Smoking status: Former     Current packs/day: 0.00     Average packs/day: 0.3 packs/day for 2.0 years (0.5 ttl pk-yrs)     Types: Cigarettes     Quit date: 2023     Years since quittin.0     Passive exposure: Never   • Smokeless tobacco: Never   • Tobacco comments:     As soon as I found out that I was pregnant, I completely stopped smoking.   Vaping Use   • Vaping status: Never Used   Substance and Sexual Activity   • Alcohol use: Not Currently     Comment: I rarely drink   • Drug use: Never   • Sexual activity: Yes     Partners: Male     Birth control/protection: None, I.U.D.     Comment: no new partner in past year   Other Topics Concern   • Not on file   Social  History Narrative   • Not on file     Social Determinants of Health     Financial Resource Strain: Not on file   Food Insecurity: Not on file   Transportation Needs: Not on file   Physical Activity: Not on file   Stress: Not on file   Social Connections: Not on file   Intimate Partner Violence: Not At Risk (11/17/2023)    Humiliation, Afraid, Rape, and Kick questionnaire    • Fear of Current or Ex-Partner: No    • Emotionally Abused: No    • Physically Abused: No    • Sexually Abused: No   Housing Stability: Not on file     Current Outpatient Medications on File Prior to Visit   Medication Sig Dispense Refill   • buPROPion (WELLBUTRIN XL) 150 mg 24 hr tablet Take 1 tablet (150 mg total) by mouth every morning 30 tablet 5   • DULoxetine (CYMBALTA) 60 mg delayed release capsule Take 60 mg by mouth every morning     • Levonorgestrel (MIRENA) 20 MCG/DAY IUD 1 each by Intrauterine route once     • levothyroxine 50 mcg tablet TAKE 1 TABLET BY MOUTH ONCE DAILY 90 tablet 1   • spironolactone (ALDACTONE) 50 mg tablet Take 1 tablet (50 mg total) by mouth 2 (two) times a day 200 tablet 1   • valACYclovir (VALTREX) 500 mg tablet if needed Takes when needed       No current facility-administered medications on file prior to visit.     Review of Systems   Constitutional: Negative.  Negative for irritability.   HENT: Negative.     Eyes: Negative.    Respiratory: Negative.  Negative for shortness of breath.    Cardiovascular: Negative.  Negative for chest pain and palpitations.   Gastrointestinal: Negative.  Negative for nausea.   Endocrine: Negative.    Genitourinary: Negative.  Negative for impotence.   Musculoskeletal: Negative.    Skin: Negative.    Allergic/Immunologic: Negative.    Neurological: Negative.  Negative for dizziness.   Hematological: Negative.    Psychiatric/Behavioral: Negative.  Negative for confusion, decreased concentration and suicidal ideas. The patient is not nervous/anxious and does not have insomnia.      "   Objective:  Vitals:    03/06/24 1710 03/06/24 1733   BP: 130/84 124/80   BP Location: Left arm    Patient Position: Sitting    Cuff Size: Large    Pulse: 95 68   Resp: 18    Temp: 98.9 °F (37.2 °C)    TempSrc: Tympanic    SpO2: 97%    Weight: 78.7 kg (173 lb 6.4 oz)    Height: 5' 3\" (1.6 m)      Body mass index is 30.72 kg/m².     Physical Exam  Constitutional:       Appearance: She is well-developed.   HENT:      Head: Normocephalic and atraumatic.      Right Ear: Tympanic membrane, ear canal and external ear normal.      Left Ear: Tympanic membrane, ear canal and external ear normal.      Nose: Nose normal.      Mouth/Throat:      Mouth: Mucous membranes are moist.      Pharynx: No oropharyngeal exudate.   Eyes:      Extraocular Movements: Extraocular movements intact.      Conjunctiva/sclera: Conjunctivae normal.      Pupils: Pupils are equal, round, and reactive to light.   Neck:      Thyroid: No thyromegaly.      Vascular: No JVD.      Trachea: No tracheal deviation.   Cardiovascular:      Rate and Rhythm: Normal rate and regular rhythm.      Pulses: Normal pulses.      Heart sounds: Normal heart sounds. No murmur heard.     No friction rub. No gallop.   Pulmonary:      Effort: Pulmonary effort is normal. No respiratory distress.      Breath sounds: Normal breath sounds. No stridor. No wheezing or rales.   Chest:      Chest wall: No tenderness.   Abdominal:      General: Bowel sounds are normal. There is no distension.      Palpations: Abdomen is soft. There is no mass.      Tenderness: There is no abdominal tenderness. There is no guarding or rebound.   Musculoskeletal:         General: No swelling, tenderness, deformity or signs of injury. Normal range of motion.      Cervical back: Normal range of motion.      Right lower leg: No edema.      Left lower leg: No edema.   Lymphadenopathy:      Cervical: No cervical adenopathy.   Skin:     General: Skin is warm and dry.   Neurological:      General: No focal " deficit present.      Mental Status: She is alert and oriented to person, place, and time.      Cranial Nerves: No cranial nerve deficit.      Motor: No abnormal muscle tone.      Coordination: Coordination normal.      Deep Tendon Reflexes: Reflexes are normal and symmetric. Reflexes normal.             Depression Screening and Follow-up Plan: Patient's depression screening was positive with a PHQ-9 score of 12. Patient assessed for underlying major depression. Brief counseling provided and recommend additional follow-up/re-evaluation next office visit.

## 2024-03-08 ENCOUNTER — OFFICE VISIT (OUTPATIENT)
Dept: URGENT CARE | Facility: CLINIC | Age: 29
End: 2024-03-08
Payer: COMMERCIAL

## 2024-03-08 VITALS
DIASTOLIC BLOOD PRESSURE: 78 MMHG | OXYGEN SATURATION: 97 % | SYSTOLIC BLOOD PRESSURE: 131 MMHG | TEMPERATURE: 98.1 F | HEART RATE: 94 BPM | RESPIRATION RATE: 18 BRPM

## 2024-03-08 DIAGNOSIS — R19.7 DIARRHEA, UNSPECIFIED TYPE: Primary | ICD-10-CM

## 2024-03-08 PROCEDURE — 99212 OFFICE O/P EST SF 10 MIN: CPT | Performed by: PHYSICIAN ASSISTANT

## 2024-03-08 NOTE — PROGRESS NOTES
[facility]        NAME: Lupis Kidd is a 28 y.o. female  : 1995    MRN: [id number]  DATE: 2024  TIME: 11:43 AM    Assessment and Plan   Diarrhea, unspecified type [R19.7]  1. Diarrhea, unspecified type              Patient Instructions   Suspected norovirus which should resolve within 24-48 hours.  Rest, continue to encourage liquids and avoid household contacts if possible.     Start with drinking clear liquids (i.e. Gatorade, Powerade, Pedialyte, etc but avoid red liquids).    You may advance to bland diet 6-8 hrs after last vomiting (ie. BRAT - bananas, rice, applesauce, toast) as tolerated.    Recommend avoiding milk products, spicy, greasy foods, and citrus products over the next 1-2 weeks. Advance diet as tolerated.    Follow up with your PCP in 1-2 days if symptoms persist.    Go to the ER if symptoms are worsening.     Follow up with PCP in 3-5 days.  Proceed to  ER if symptoms worsen.    If tests have been performed at [facility], our office will contact you with results if changes need to be made to the care plan discussed with you at the visit.  You can review your full results on [facility].    Chief Complaint     Chief Complaint   Patient presents with    Diarrhea     Patient has had multiple instances of diarrhea starting early this morning. She also vomited once this morning. Needs a note for work          History of Present Illness       Diarrhea   This is a new problem. The current episode started today. The problem occurs 2 to 4 times per day. The problem has been gradually worsening. The stool consistency is described as Watery. Associated symptoms include vomiting. Pertinent negatives include no abdominal pain, arthralgias, chills, coughing, fever or URI. She has tried anti-motility drug for the symptoms. The treatment provided no relief.   She denies bloody/tarry stools or vomitus.     Review of Systems   Review of Systems   Constitutional:  Positive for  appetite change. Negative for chills and fever.   HENT:  Negative for ear pain and sore throat.    Eyes:  Negative for pain and visual disturbance.   Respiratory:  Negative for cough and shortness of breath.    Cardiovascular:  Negative for chest pain and palpitations.   Gastrointestinal:  Positive for diarrhea, nausea and vomiting. Negative for abdominal pain, blood in stool and constipation.   Genitourinary:  Negative for dysuria and hematuria.   Musculoskeletal:  Negative for arthralgias and back pain.   Skin:  Negative for color change and rash.   Neurological:  Negative for seizures and syncope.   All other systems reviewed and are negative.        Current Medications       Current Outpatient Medications:     buPROPion (WELLBUTRIN XL) 150 mg 24 hr tablet, Take 1 tablet (150 mg total) by mouth every morning, Disp: 30 tablet, Rfl: 5    DULoxetine (CYMBALTA) 60 mg delayed release capsule, Take 60 mg by mouth every morning, Disp: , Rfl:     Levonorgestrel (MIRENA) 20 MCG/DAY IUD, 1 each by Intrauterine route once, Disp: , Rfl:     levothyroxine 50 mcg tablet, TAKE 1 TABLET BY MOUTH ONCE DAILY, Disp: 90 tablet, Rfl: 1    spironolactone (ALDACTONE) 50 mg tablet, Take 1 tablet (50 mg total) by mouth 2 (two) times a day, Disp: 200 tablet, Rfl: 1    valACYclovir (VALTREX) 500 mg tablet, if needed Takes when needed, Disp: , Rfl:     Current Allergies     Allergies as of 03/08/2024    (No Known Allergies)            The following portions of the patient's history were reviewed and updated as appropriate: allergies, current medications, past family history, past medical history, past social history, past surgical history and problem list.     Past Medical History:   Diagnosis Date    Anxiety and depression     previously on Cymbalta, stopped before fall 2022 due to cost of visits. Currently no medication and self-manage.    Depression     Factor 5 Leiden mutation, heterozygous (HCC)     Genital herpes     Last out break  per pt months ago (23). On Valtrex only w/outbreak.    History of loop electrical excision procedure (LEEP) 2019    Human papilloma virus     Hypothyroid     Currently on Levothyroxine 25mg and in the process of finding a new endocrinologist. Does not remember when last TSH check.    Miscarriage 22    This was my first pregnancy. The miscarriage started Friday, 22    PCOS (polycystic ovarian syndrome)     Currently on metformin 500 mg x2 daily.     delivery     Precipitously delivered at 35w0d in [facility] upon arrival to hospital       Past Surgical History:   Procedure Laterality Date    CERVICAL BIOPSY  W/ LOOP ELECTRODE EXCISION      US GUIDED THYROID BIOPSY  2023    US GUIDED THYROID BIOPSY  2019    WISDOM TOOTH EXTRACTION         Family History   Problem Relation Age of Onset    Thyroid disease Mother     Hypothyroidism Mother     Depression Father     Cancer Father         Multiple Myloma    Anxiety disorder Father     Deep vein thrombosis Father          Medications have been verified.        Objective   /78   Pulse 94   Temp 98.1 °F (36.7 °C)   Resp 18   SpO2 97%   No LMP recorded.       Physical Exam     Physical Exam  Vitals and nursing note reviewed.   Constitutional:       General: She is not in acute distress.     Appearance: Normal appearance.   HENT:      Head: Normocephalic and atraumatic.      Mouth/Throat:      Mouth: Mucous membranes are moist.      Pharynx: No posterior oropharyngeal erythema.   Eyes:      Conjunctiva/sclera: Conjunctivae normal.   Cardiovascular:      Rate and Rhythm: Normal rate and regular rhythm.      Pulses: Normal pulses.      Heart sounds: Normal heart sounds.   Pulmonary:      Effort: Pulmonary effort is normal.      Breath sounds: Normal breath sounds.   Abdominal:      General: Bowel sounds are normal. There is no distension.      Palpations: There is no mass.      Tenderness: There is no abdominal tenderness. There is  no guarding.   Skin:     General: Skin is warm and dry.   Neurological:      Mental Status: She is alert and oriented to person, place, and time.   Psychiatric:         Mood and Affect: Mood normal.         Behavior: Behavior normal.

## 2024-03-08 NOTE — ASSESSMENT & PLAN NOTE
The patient is doing much better with the addition of the bupropion.  Her depression symptoms are well-controlled.  We will maintain her on this current dose and we will continue to monitor.  We will see her back as scheduled.

## 2024-03-08 NOTE — PATIENT INSTRUCTIONS
Start with drinking clear liquids (i.e. Gatorade, Powerade, Pedialyte, etc but avoid red liquids).    You may advance to bland diet 6-8 hrs after last vomiting (ie. BRAT - bananas, rice, applesauce, toast) as tolerated.    Recommend avoiding milk products, spicy, greasy foods, and citrus products over the next 1-2 weeks. Advance diet as tolerated.    Follow up with your PCP in 1-2 days if symptoms persist.    Go to the ER if symptoms are worsening.

## 2024-03-08 NOTE — ASSESSMENT & PLAN NOTE
The patient's anxiety symptoms are stable on the current medication.  We will maintain her on the same dose.  We will see her back as scheduled.

## 2024-03-08 NOTE — LETTER
March 8, 2024     Patient: Lupis Kidd   YOB: 1995   Date of Visit: 3/8/2024       To Whom It May Concern:    It is my medical opinion that Lupis Kidd may return to work on 3/11/2024 .    If you have any questions or concerns, please don't hesitate to call.         Sincerely,        Марина Dent PA-C    CC: No Recipients

## 2024-03-26 ENCOUNTER — OFFICE VISIT (OUTPATIENT)
Dept: OBGYN CLINIC | Facility: CLINIC | Age: 29
End: 2024-03-26
Payer: COMMERCIAL

## 2024-03-26 VITALS
DIASTOLIC BLOOD PRESSURE: 92 MMHG | HEART RATE: 94 BPM | BODY MASS INDEX: 30.65 KG/M2 | WEIGHT: 173 LBS | HEIGHT: 63 IN | SYSTOLIC BLOOD PRESSURE: 140 MMHG

## 2024-03-26 DIAGNOSIS — M77.11 LATERAL EPICONDYLITIS OF RIGHT ELBOW: ICD-10-CM

## 2024-03-26 DIAGNOSIS — M25.521 PAIN IN RIGHT ELBOW: Primary | ICD-10-CM

## 2024-03-26 PROCEDURE — 99203 OFFICE O/P NEW LOW 30 MIN: CPT | Performed by: PHYSICIAN ASSISTANT

## 2024-03-26 PROCEDURE — 20551 NJX 1 TENDON ORIGIN/INSJ: CPT | Performed by: PHYSICIAN ASSISTANT

## 2024-03-26 RX ORDER — LIDOCAINE HYDROCHLORIDE 10 MG/ML
0.5 INJECTION, SOLUTION INFILTRATION; PERINEURAL
Status: COMPLETED | OUTPATIENT
Start: 2024-03-26 | End: 2024-03-26

## 2024-03-26 RX ORDER — DEXAMETHASONE SODIUM PHOSPHATE 10 MG/ML
40 INJECTION, SOLUTION INTRAMUSCULAR; INTRAVENOUS
Status: COMPLETED | OUTPATIENT
Start: 2024-03-26 | End: 2024-03-26

## 2024-03-26 RX ADMIN — LIDOCAINE HYDROCHLORIDE 0.5 ML: 10 INJECTION, SOLUTION INFILTRATION; PERINEURAL at 18:30

## 2024-03-26 RX ADMIN — DEXAMETHASONE SODIUM PHOSPHATE 40 MG: 10 INJECTION, SOLUTION INTRAMUSCULAR; INTRAVENOUS at 18:30

## 2024-03-26 NOTE — PATIENT INSTRUCTIONS
Tennis Elbow   WHAT YOU NEED TO KNOW:   What is tennis elbow?  Tennis elbow is an injury of the tendons in your elbow. Tendons are strong tissues that connect muscle to bone.   What causes tennis elbow?  Tennis elbow is caused by overuse of the muscles in your forearm. These muscles are used to straighten your arm or lift your hand and wrist. Fast, repeated arm movements can lead to inflammation and small tears in your tendon. Tennis, painting, and manual labor are common activities that can cause tennis elbow.  What are the signs and symptoms of tennis elbow?   Pain in your elbow that travels to your shoulder, forearm, or fingers    Weakness in your wrist or hand    Trouble holding, lifting, or grabbing an object, such as a coffee cup    Red, swollen, or warm skin on the outside of your elbow    How is tennis elbow diagnosed?  Your healthcare provider will feel around your elbow to check for painful areas. He or she or she will check the movement of your elbow, wrist, and fingers. An x-ray, ultrasound, or MRI may show tendon damage. You may be given contrast liquid to help the tendons show up better in the pictures. Tell the provider if you have ever had an allergic reaction to contrast liquid. Do not enter the MRI room with anything metal. Metal can cause serious injury. Tell the provider if you have any metal in or on your body.  How is tennis elbow treated?   Acetaminophen  decreases pain and fever. It is available without a doctor's order. Ask how much to take and how often to take it. Follow directions. Read the labels of all other medicines you are using to see if they also contain acetaminophen, or ask your doctor or pharmacist. Acetaminophen can cause liver damage if not taken correctly.    NSAIDs , such as ibuprofen, help decrease swelling, pain, and fever. This medicine is available with or without a doctor's order. NSAIDs can cause stomach bleeding or kidney problems in certain people. If you take blood  thinner medicine, always ask your healthcare provider if NSAIDs are safe for you. Always read the medicine label and follow directions.    A steroid injection  will help decrease pain and swelling.    Physical therapy  may be recommended. A physical therapist teaches you exercises to help improve movement and strength, and to decrease pain.     Surgery  may be needed if your symptoms do not improve with other treatments. During surgery, your provider will remove any damaged tissue. He or she may also cut your tendon and reattach it.    How can I manage my symptoms?   Wear your support device  as directed. Devices, such as an arm strap, brace, or splint, help limit your arm movement. They also decrease pain and help prevent more damage to your tendon. Ask your provider how to care for your arm while you wear a brace or splint.    Rest  your injured arm and avoid activities that cause pain. This will help your tendons heal.    Apply ice  on your elbow for 15 to 20 minutes every hour or as directed. Use an ice pack, or put crushed ice in a plastic bag. Cover the bag with a towel before you apply it to your skin. Ice helps prevent tissue damage and decreases swelling and pain.    Elevate  your elbow above the level of your heart as often as you can. This will help decrease swelling and pain. Prop your elbow on pillows or blankets to keep it elevated comfortably.       When should I seek immediate care?   You suddenly have no feeling in your arm, hand, or fingers.    You suddenly cannot move your arm, wrist, hand, or fingers.    When should I call my doctor?   Your symptoms do not get better within 2 weeks, even with treatment.    You have more pain or weakness in your arm, wrist, hand, or fingers.    You have new numbness or tingling in your arm, hand, or fingers.    You have questions or concerns about your condition or care.    CARE AGREEMENT:   You have the right to help plan your care. Learn about your health  condition and how it may be treated. Discuss treatment options with your healthcare providers to decide what care you want to receive. You always have the right to refuse treatment. The above information is an  only. It is not intended as medical advice for individual conditions or treatments. Talk to your doctor, nurse or pharmacist before following any medical regimen to see if it is safe and effective for you.  © Copyright [employer] 2023 Information is for End User's use only and may not be sold, redistributed or otherwise used for commercial purposes.

## 2024-03-26 NOTE — PROGRESS NOTES
Orthopaedic Surgery - Office Note  Lupis Kidd (28 y.o. female)   : 1995   MRN: [id number]  Encounter Date: 3/26/2024    No chief complaint on file.        Assessment/Plan  Diagnoses and all orders for this visit:    Pain in right elbow  -     Ambulatory Referral to Occupational Therapy; Future  -     Durable Medical Equipment    Lateral epicondylitis of right elbow  -     Ambulatory Referral to Occupational Therapy; Future  -     Durable Medical Equipment    Diagnosis as well as treatment options were reviewed with the patient in the office today.  I would recommend conservative treatments of icing the elbow 20 minutes on 1 hour off 3 times a day.  I would recommend a ladder epicondylitis strap she was educated on appropriate usage and tension.  She will use an oral anti-inflammatory such as Aleve 1 tablet twice daily with food stopping and calling if any stomach upset occurs.  She may consider a topical anti-inflammatory such as Voltaren or diclofenac gel.  I would recommend occupational therapy consult for evaluation and treatment and a dedicated home exercise program.    The risks and benefits of a cortisone injection were reviewed.  Shared decision making was utilized and she adamantly wishes to proceed with the injection.  She tolerated it well without any complication and did note decrease in pain symptoms postinjection.     Return if symptoms worsen or fail to improve.        History of Present Illness  This is a new patient with right elbow pain.  Patient developed symptoms in February and was seen in urgent care on 2024 noting a approximately 10-day history of pain in the elbow radiating to the thumb without any known trauma.  She was referred to therapy but has not attended.  Patient reports she does not have any time to attend therapy and the co-pays were too expensive.  Her pain remains in the lateral elbow and is worse with wrist extension activities.  She cannot recall any specific  "trauma.  No paresthesias are reported    Review of Systems  Pertinent items are noted in HPI.  All other systems were reviewed and are negative.    Physical Exam  /92   Pulse 94   Ht 5' 3\" (1.6 m)   Wt 78.5 kg (173 lb)   BMI 30.65 kg/m²   Cons: Appears well.  No apparent distress.  Psych: Alert. Oriented x3.  Mood and affect normal.  Examination patient's right elbow is tender to palpation at the lateral condyle.  She has reproducible pain with wrist extension.  Wrist range of motion is full.  Elbow range of motion is full.  She has no tenderness on the medial epicondyle olecranon process antecubital space or triceps.  Elbow strength is 5 out of 5 to flexion and extension.  She has 5 out of 5 wrist strength to flexion, extension, ulnar deviation, supination, radial deviation, and pronation.  She is neurovascular intact in the right upper extremity.  She is nontender throughout the forearm.  Distal radial ulnar pulses are +2               Studies Reviewed  Urgent care note from 2/13/2024 and PCP note from 3/6/2024 were reviewed by myself in the office today    Hand/upper extremity injection: R elbow  Universal Protocol:  Risks and benefits: risks, benefits and alternatives were discussed  Consent given by: patient  Time out: Immediately prior to procedure a \"time out\" was called to verify the correct patient, procedure, equipment, support staff and site/side marked as required.  Patient understanding: patient states understanding of the procedure being performed  Patient consent: the patient's understanding of the procedure matches consent given  Relevant documents: relevant documents present and verified  Test results: test results available and properly labeled  Site marked: the operative site was marked  Radiology Images displayed and confirmed. If images not available, report reviewed: imaging studies available  Patient identity confirmed: verbally with patient  Supporting Documentation  Indications: " pain   Procedure Details  Condition:lateral epicondylitis Site: R elbow   Needle size: 22 G  Ultrasound guidance: no  Medications administered: 0.5 mL lidocaine 1 %; 40 mg dexamethasone 100 mg/10 mL  Patient tolerance: patient tolerated the procedure well with no immediate complications  Dressing:  Sterile dressing applied         Medical, Surgical, Family, and Social History  The patient's medical history, family history, and social history, were reviewed and updated as appropriate.    Past Medical History:   Diagnosis Date    Anxiety and depression     previously on Cymbalta, stopped before 2022 due to cost of visits. Currently no medication and self-manage.    Depression     Factor 5 Leiden mutation, heterozygous (HCC)     Genital herpes     Last out break per pt months ago (23). On Valtrex only w/outbreak.    History of loop electrical excision procedure (LEEP) 2019    Human papilloma virus     Hypothyroid     Currently on Levothyroxine 25mg and in the process of finding a new endocrinologist. Does not remember when last TSH check.    Miscarriage 22    This was my first pregnancy. The miscarriage started Friday, 22    PCOS (polycystic ovarian syndrome)     Currently on metformin 500 mg x2 daily.     delivery     Precipit[facility] at [facility]0d [facility] [place] [facility] [place] to hospital       Past Surgical History:   Procedure Laterality Date    CERVICAL BIOPSY  W/ LOOP ELECTRODE EXCISION      US GUIDED THYROID BIOPSY  2023    US GUIDED THYROID BIOPSY  2019    WISDOM TOOTH EXTRACTION         Family History   Problem Relation Age of Onset    Thyroid disease Mother     Hypothyroidism Mother     Depression Father     Cancer Father         Multiple Myloma    Anxiety disorder Father     Deep vein thrombosis Father        Social History     Occupational History    Not on file   Tobacco Use    Smoking status: Former     Current packs/day: 0.00     Average packs/day: 0.3 packs/day  for 2.0 years (0.5 ttl pk-yrs)     Types: Cigarettes     Quit date: 2023     Years since quittin.1     Passive exposure: Never    Smokeless tobacco: Never    Tobacco comments:     As soon as I found out that I was pregnant, I completely stopped smoking.   Vaping Use    Vaping status: Never Used   Substance and Sexual Activity    Alcohol use: Not Currently     Comment: I rarely drink    Drug use: Never    Sexual activity: Yes     Partners: Male     Birth control/protection: None, I.U.D.     Comment: no new partner in past year       No Known Allergies      Current Outpatient Medications:     buPROPion (WELLBUTRIN XL) 150 mg 24 hr tablet, Take 1 tablet (150 mg total) by mouth every morning, Disp: 30 tablet, Rfl: 5    DULoxetine (CYMBALTA) 60 mg delayed release capsule, Take 60 mg by mouth every morning, Disp: , Rfl:     Levonorgestrel (MIRENA) 20 MCG/DAY IUD, 1 each by Intrauterine route once, Disp: , Rfl:     levothyroxine 50 mcg tablet, TAKE 1 TABLET BY MOUTH ONCE DAILY, Disp: 90 tablet, Rfl: 1    spironolactone (ALDACTONE) 50 mg tablet, Take 1 tablet (50 mg total) by mouth 2 (two) times a day, Disp: 200 tablet, Rfl: 1    valACYclovir (VALTREX) 500 mg tablet, if needed Takes when needed, Disp: , Rfl:       Herve Tyler PA-C

## 2024-03-30 LAB
T4 FREE SERPL-MCNC: 0.64 NG/DL (ref 0.82–1.77)
TSH SERPL DL<=0.005 MIU/L-ACNC: 178 UIU/ML (ref 0.45–4.5)

## 2024-04-01 DIAGNOSIS — E03.9 ACQUIRED HYPOTHYROIDISM: Primary | ICD-10-CM

## 2024-04-02 ENCOUNTER — TELEPHONE (OUTPATIENT)
Age: 29
End: 2024-04-02

## 2024-05-14 DIAGNOSIS — E03.9 ACQUIRED HYPOTHYROIDISM: ICD-10-CM

## 2024-05-14 RX ORDER — LEVOTHYROXINE SODIUM 0.05 MG/1
50 TABLET ORAL DAILY
Qty: 90 TABLET | Refills: 1 | Status: SHIPPED | OUTPATIENT
Start: 2024-05-14

## 2024-05-16 DIAGNOSIS — F33.41 RECURRENT MAJOR DEPRESSIVE DISORDER, IN PARTIAL REMISSION (HCC): Primary | ICD-10-CM

## 2024-05-16 RX ORDER — DULOXETIN HYDROCHLORIDE 60 MG/1
60 CAPSULE, DELAYED RELEASE ORAL EVERY MORNING
Refills: 0 | Status: CANCELLED | OUTPATIENT
Start: 2024-05-16

## 2024-05-16 RX ORDER — DULOXETIN HYDROCHLORIDE 60 MG/1
60 CAPSULE, DELAYED RELEASE ORAL EVERY MORNING
Qty: 30 CAPSULE | Refills: 3 | Status: SHIPPED | OUTPATIENT
Start: 2024-05-16

## 2024-05-16 NOTE — TELEPHONE ENCOUNTER
Patient called in regarding medication Duloxetine. She has one dose left to take this evening. Patient did request refill through my chart. Please advise

## 2024-06-11 DIAGNOSIS — E03.9 ACQUIRED HYPOTHYROIDISM: Primary | ICD-10-CM

## 2024-06-11 LAB
T4 FREE SERPL-MCNC: 0.86 NG/DL (ref 0.82–1.77)
TSH SERPL DL<=0.005 MIU/L-ACNC: 84.4 UIU/ML (ref 0.45–4.5)

## 2024-06-11 RX ORDER — LEVOTHYROXINE SODIUM 0.07 MG/1
75 TABLET ORAL DAILY
Qty: 60 TABLET | Refills: 1 | Status: SHIPPED | OUTPATIENT
Start: 2024-06-11 | End: 2024-06-14

## 2024-06-13 RX ORDER — LEVOTHYROXINE SODIUM 0.1 MG/1
100 TABLET ORAL DAILY
Qty: 60 TABLET | Refills: 1 | Status: CANCELLED | OUTPATIENT
Start: 2024-06-13

## 2024-06-13 NOTE — PROGRESS NOTES
Established Patient Progress Note       Chief Complaint   Patient presents with    Hypothyroidism      History of Present Illness:     Lupis Kidd is a 29 y.o. female with a history of subclinical hypothyroidism on levothyroxine, multinodular thyroid s/p FNA of 0.9cm right thyroid nodule in 08/2019 with neg results, repeat US 04/23 showed 2.6cm TR4 nodule b/l s/p FNA 05/23 right nodule- neg FNA, left nodule was bethesda 1- non diagnostic. PCOS since age 21 who presents today for post partum hypothyroidism/PCOS management.     Subclinical hypothyroidism:- Dx with thyroid issue in 2017, Started on 25mcg levothyroxine. Dose inc during pregnancy to 112mcg. Since delivery had to reduce dose since last labs showed overreplacement. Dose reduced to 50mcg daily with repeat labs recommended which showed elevated TSH and hence we increased her dose to 75mcg last visit. Recent labs show TSH 84 with T4 0.86 improved from previous .   Currently reports feeling well, does report tiredness but not any worse. Denies constipation, diarrhea, tremors, palpitations.     Dx of Non toxic Multinodular goiter:- Diagnosed based on exam-US done in 2018/2019, had FNA done twice as well but reports in file only for 1 right sided nodule 0.9cm in side- neg for malignancy in 2019. Repeat US 2023 showed growth in b/l nodules 2.6cm TR4 and therefore underwent FNA for both on 05/23. Right nodule benign but left sample was non diagnostic.  Hx of radiation to neck, family hx of thyroid cancer. Repeat US ordered 11/23 showed stable 2.5cm Right TR4 nodule and 2cm TR4 left mid gland nodule.   Denies any dysphagia, no odynophagia.    Dx PCOS- age 21. Previously on OCP for menstral regulation, taken off this since was dx with factor 5 leiden def, started on IUD instead,  first pregnancy- naturally conceived. Previously on metformin but dx during pregnancy d/t normal BG. Never been on aldactone in past. Has some hair growth on face but  manageable.  PCOS mimickers checked now and are normal   Menses:- on IUD now  Hyperandrogenism- Started on aldactone 50mg BID last visit. Reports symptoms have slightly improved but not too much, still has to pluck every week.   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 Does not drink alcohol, no smoking anymore, no other drugs   Fhx:   thyroid disorder- Mother had thyroid issues. Possible PCOS in mother.     Patient Active Problem List   Diagnosis    Anxiety    Recurrent major depressive disorder, in partial remission (HCC)    Acquired hypothyroidism    Polycystic ovarian syndrome    Herpes simplex infection of genitourinary system    Human papilloma virus    History of cervical LEEP biopsy affecting care of mother, antepartum, second trimester    Genital herpes simplex virus (HSV) infection in mother affecting pregnancy    Multinodular thyroid    Factor 5 Leiden mutation, heterozygous (HCC)    Gastroesophageal reflux in pregnancy    Encounter for insertion of mirena IUD    IUD check up    Lateral epicondylitis of right elbow    Pain in right elbow      Past Medical History:   Diagnosis Date    Anxiety and depression     previously on Cymbalta, stopped before 2022 due to cost of visits. Currently no medication and self-manage.    Depression     Factor 5 Leiden mutation, heterozygous (HCC)     Genital herpes     Last out break per pt months ago (21). On Valtrex only w/outbreak.    History of loop electrical excision procedure (LEEP) 2019    Human papilloma virus     Hypothyroid     Currently on Levothyroxine 25mg and in the process of finding a new endocrinologist. Does not remember when last TSH check.    Miscarriage 20    This was my first pregnancy. The miscarriage started Friday, 20    PCOS (polycystic ovarian syndrome)     Currently on metformin 500 mg x2 daily.     delivery     Precipitously delivered at 35w0d in [facility] upon arrival  to hospital      Past Surgical History:   Procedure Laterality Date    CERVICAL BIOPSY  W/ LOOP ELECTRODE EXCISION      US GUIDED THYROID BIOPSY  2023    US GUIDED THYROID BIOPSY  2019    WISDOM TOOTH EXTRACTION        Family History   Problem Relation Age of Onset    Thyroid disease Mother     Hypothyroidism Mother     Depression Father     Cancer Father         Multiple Myloma    Anxiety disorder Father     Deep vein thrombosis Father      Social History     Tobacco Use    Smoking status: Former     Current packs/day: 0.00     Average packs/day: 0.3 packs/day for 2.0 years (0.5 ttl pk-yrs)     Types: Cigarettes     Quit date: 2023     Years since quittin.3     Passive exposure: Never    Smokeless tobacco: Never    Tobacco comments:     As soon as I found out that I was pregnant, I completely stopped smoking.   Substance Use Topics    Alcohol use: Not Currently     Comment: I rarely drink     No Known Allergies    Current Outpatient Medications:     buPROPion (WELLBUTRIN XL) 150 mg 24 hr tablet, Take 1 tablet (150 mg total) by mouth every morning, Disp: 30 tablet, Rfl: 5    DULoxetine (CYMBALTA) 60 mg delayed release capsule, Take 1 capsule (60 mg total) by mouth every morning, Disp: 30 capsule, Rfl: 3    Levonorgestrel (MIRENA) 20 MCG/DAY IUD, 1 each by Intrauterine route once, Disp: , Rfl:     levothyroxine (Euthyrox) 100 mcg tablet, Take 1 tablet (100 mcg total) by mouth daily, Disp: 60 tablet, Rfl: 1    spironolactone (ALDACTONE) 50 mg tablet, Take 1 tablet (50 mg total) by mouth 2 (two) times a day, Disp: 200 tablet, Rfl: 1    valACYclovir (VALTREX) 500 mg tablet, if needed Takes when needed, Disp: , Rfl:     Review of Systems   Constitutional:  Negative for fatigue and unexpected weight change.   HENT:  Negative for trouble swallowing and voice change.    Eyes:  Negative for photophobia and visual disturbance.   Gastrointestinal:  Negative for abdominal pain, constipation, diarrhea and nausea.  "  Endocrine: Negative for cold intolerance and heat intolerance.   Genitourinary: Negative.    Musculoskeletal: Negative.    Psychiatric/Behavioral: Negative.         Physical Exam:  Body mass index is 31 kg/m².  /70   Pulse 94   Ht 5' 3\" (1.6 m)   Wt 79.4 kg (175 lb)   SpO2 98%   BMI 31.00 kg/m²    Wt Readings from Last 3 Encounters:   06/14/24 79.4 kg (175 lb)   03/26/24 78.5 kg (173 lb)   03/06/24 78.7 kg (173 lb 6.4 oz)       Physical Exam  Constitutional:       Appearance: Normal appearance. She is obese.   Neck:      Comments: Diffusely enlarged thyroid   Cardiovascular:      Rate and Rhythm: Normal rate and regular rhythm.      Pulses: Normal pulses.   Pulmonary:      Effort: Pulmonary effort is normal.   Abdominal:      General: Bowel sounds are normal.      Palpations: Abdomen is soft.      Comments: Gravid   Skin:     General: Skin is warm and dry.      Capillary Refill: Capillary refill takes less than 2 seconds.   Neurological:      General: No focal deficit present.      Mental Status: She is alert and oriented to person, place, and time.   Psychiatric:         Mood and Affect: Mood normal.         Labs:    Latest Reference Range & Units 03/08/23 08:16 03/28/23 07:07 04/27/23 07:22 07/07/23 07:14 11/13/23 09:40 03/29/24 09:53 06/10/24 12:10   TSH, POC 0.450 - 4.500 uIU/mL 5.920 (H) 5.670 (H) 2.390 2.550 0.010 (L) 178.000 (H) 84.400 (H)   FREE T4 0.82 - 1.77 ng/dL   1.49 1.30 3.01 (H) 0.64 (L) 0.86   (H): Data is abnormally high  (L): Data is abnormally low     Latest Reference Range & Units 11/13/23 09:40   17-OH PROGESTERONE ng/dL 199   LUTEINIZING HORMONE mIU/mL 10.8   FSH, POC mIU/mL 3.7   Hemoglobin A1C 4.8 - 5.6 % 5.1   eAG, EST AVG Glucose mg/dL 100   Testosterone, Total, LC/MS 13 - 71 ng/dL 47   TESTOSTERONE FREE 0.0 - 4.2 pg/mL 1.9   TSH, POC 0.450 - 4.500 uIU/mL 0.010 (L)   Free T4 0.82 - 1.77 ng/dL 3.01 (H)   (L): Data is abnormally low  (H): Data is abnormally high    Radiology- "     11/23  THYROID ULTRASOUND     INDICATION:    E04.2: Nontoxic multinodular goiter.     COMPARISON: Ultrasound from March 25, 2023  TECHNIQUE:   Ultrasound of the thyroid was performed with a high frequency linear transducer in transverse and sagittal planes including volumetric imaging sweeps as well as traditional still imaging technique.     FINDINGS:  Normal homogeneous smooth echotexture.     Right lobe: 5.0 x 2.3 x 1.6 cm. Volume 9.1 mL  Left lobe:  3.8 x 1.6 x 1.2 cm. Volume 3.3 mL  Isthmus: 0.4  cm.     Nodule 1.  Image 8 series 1.  Right mid gland measuring 2.5 x 0.8 by 0.9. Cm. Previously measuring 2.6 x 1.1 x .9  COMPOSITION: 2 points, solid or almost completely solid.  ECHOGENICITY:  2 points, hypoechoic.  SHAPE:  0 points, wider-than-tall.  MARGIN: 0 points, ill-defined.  ECHOGENIC FOCI:  0 points, none or large comet-tail artifacts.  TI-RADS Classification: TR 4 (4-6 points), FNA if > 1.5 cm. Follow if > 1cm. This nodule has a previous benign biopsy and remains stable     Nodule 2.  Image 132 series 4000 and image 52 series 46 and.  Mid left thyroid lobe measuring 2 x 0.7x 0.8  cm. Previously measuring 2.6 x 0.7 x 1 cm stable taking into account the difference in the technique  COMPOSITION: 2 points, solid or almost completely solid.  ECHOGENICITY:  2 points, hypoechoic.  SHAPE:  0 points, wider-than-tall.  MARGIN: 0 points, ill-defined.  ECHOGENIC FOCI:  0 points, none or large comet-tail artifacts.  TI-RADS Classification: TR 4 (4-6 points), FNA if > 1.5 cm. Follow if > 1cm.     IMPRESSION:  The previously noted nodules remain stable  These biopsies of benign results. Surveillance at 1 to 2 years can be continued      THYROID ULTRASOUND     INDICATION:    E04.1: Nontoxic single thyroid nodule.     COMPARISON:  None available; report of outside thyroid ultrasound 1/24/2019 and biopsy 8/9/2019     TECHNIQUE:   Ultrasound of the thyroid was performed with a high frequency linear transducer in  transverse and sagittal planes including volumetric imaging sweeps as well as traditional still imaging technique.     FINDINGS:  Thyroid parenchyma is diffusely heterogeneous in echotexture.     Right lobe: 5.1 x 2.1 x 1.5 cm. Volume 7.8 mL  Left lobe:  4.0 x 1.6 x 1.0 cm. Volume 2.9 mL  Isthmus: 0.3  cm.     Nodule #1.  Image 7.  RIGHT midgland nodule measuring 2.6 x 0.9 x 1.2 cm.  COMPOSITION:  2 points, solid or almost completely solid .  ECHOGENICITY:  2 points, hypoechoic.    SHAPE:  0 points, wider-than-tall.    MARGIN: 0 points, smooth.  ECHOGENIC FOCI:  0 points, none or large comet-tail artifacts.  TI-RADS Classification: TR 4 (4-6 points),  FNA if > 1.5 cm. Follow if > 1cm.  This nodule has had a previous benign biopsy.       Nodule #2.  Image 35.  LEFT midgland nodule measuring 2.6 x 0.7 x 1.0 cm.  COMPOSITION:  2 points, solid or almost completely solid .  ECHOGENICITY:  2 points, hypoechoic.    SHAPE:  0 points, wider-than-tall.    MARGIN: 0 points, smooth.  ECHOGENIC FOCI:  0 points, none or large comet-tail artifacts.  TI-RADS Classification: TR 4 (4-6 points), FNA if > 1.5 cm. Follow if > 1cm.     IMPRESSION:     The left midgland nodule meets current ACR criteria for recommending ultrasound guided biopsy.      Right midgland nodule has prior benign biopsy. Recommend follow up in 12-24 months for this nodule.     Reference: ACR Thyroid Imaging, Reporting and Data System (TI-RADS): White Paper of the ACR TI-RADS Committee. J AM Kurtis Radiol 2017;14:587-595. (additional recommendations based on American Thyroid Association 2015 guidelines.)     05/23  The study was marked in EPIC for significant notification.    Case Report   Non-gynecologic Cytology                          Case: [id number]                                   Authorizing Provider:  Diane Zuleta MD            Collected:           05/02/2023                    Ordering Location:     [facility]     Received:             "05/02/2023 1625                                      [facility]                                                             Pathologist:           Cam More MD                                                                  Specimens:   A) - Thyroid, Right, Mid Pole                                                                        B) - Thyroid, Right, Mid Pole                                                                        C) - Thyroid, Left, Mid Pole                                                                         D) - Thyroid, Left, Mid Pole                                                               Final Diagnosis   A.B. Thyroid, Right, Mid Pole ( ThinPrep and smear preparations ):  Benign ([place] Category II) - See note.     Satisfactory for evaluation.     Note: As reported in the [place] System for Reporting Thyroid Cytopathology*, the diagnostic category of \"benign/negative for malignancy\" carries a 0-3% risk of malignancy being found in subsequent resections (in the few studies of patients with benign FNA results that were followed long-term, a false negative rate of 0-3% was reported), with the usual management being clinical follow-up supplemented by ultrasonography (US) as indicated. Repeat FNA may be indicated for nodules showing significant growth or developing US abnormalities. Ultimately, clinical/imaging correlation for this patient is needed in arriving at the actual management plan.     *The [place]ark System for Reporting Thyroid Cytopathology, Fox Nayak., Fred Woodward (Eds.), 2018 (2nd ed.)        C.D.Thyroid, Left, Mid Pole ( ThinPrep and smear preparations ):  Non-diagnostic ([place] Category I) - See note.  - Other (obscuring blood, clotting artifact) - see note.  Few groups of follicular cells.  Those present partially obscured by blood.     Note: Specimen " "processed and examined, but nondiagnostic due to insufficient cellularity. A repeat aspiration should be considered if clinically indicated.     Note: As reported in the [place] System for Reporting Thyroid Cytopathology*, the diagnostic category of \"non-diagnostic\" carries a 5-10% risk of malignancy being found in subsequent resections (Non-diagnostic aspirates from solid nodules are associated with a higher risk of malignancy as compared to those showing >/= 50% cystic change and low-risk ultrasonographic features), with the usual management being repeat FNA with ultrasound guidance. Ultimately, clinical/imaging correlation for this patient is needed in arriving at the actual management plan.     *The Hartman System for Reporting Thyroid Cytopathology, Fox Nayak., Fred Woodward. (Eds.), 2018 (2nd ed.)   Electronically signed by Cam More MD on 5/4/2023 at  2:11 PM   Note    The treating physician has requested a sample(s) from the above thyroid nodule(s) be sent for analysis by  [employer] Gene Expression  (Afirma GEC), performed by [employer] ([place], [place]).  [facility] only performs this test on specimen(s) receiving a cytologic diagnosis of Hartman Category III or  IV. If such a diagnosis is rendered (above) specimen will be sent to [facility], and a separate report with  results of Afirma GEC will follow directly from [facility] (typically taking 14 days). If a cytologic  interpretation other than Hartman category III or IV is rendered, specimen will not be sent for Afirma GEC.   Intraoperative Consultation    Thyroid, Right Mid Pole FNA On-Site Evaluation:      Adequate.      Dr. More  Interpretation performed at [facility], [address] [place] [id number]  Thyroid, Left Mid Pole FNA On-Site Evaluation:      Requested additional passes.      Dr. More  Interpretation performed at [facility], [address] [place] [id number]   Gross " Description    A. 20 mL of pale pink, clear fluid, received in CytoLyt   B. 6 slides received (3 Alcohol-Fixed, 3 Diff-Quik)   C. 20 mL of pink, hazy fluid, received in CytoLyt   D. 16 slides received (8 Alcohol-Fixed, 8 Diff-Quik)      2018     Addendum by Tammi Polo MD on 2019 5:16 PM EDT  THE PREVIOUS REPORT AND SUBSEQUENT ADDENDUM REPORT DICTATED BY DR. TAMMI POLO    ADDENDUM: Cytology results as follows: Benign follicular nodule [[place]   category 2].        Imaging Results - US THYROID NEEDLE ASP (2019 1:34 PM EDT)  Impressions   2019 2:14 PM EDT    Successful ultrasound guided fine needle biopsy of nodule/s in the thyroid gland. The patient is planning on calling their physician in several days to learn the pathology results.        Imaging Results - US THYROID NEEDLE ASP (2019 1:34 PM EDT)  Narrative   2019 2:14 PM EDT    KAREN Ortega UI: [id number] : 1995 F    [id number]    ULTRASOUND GUIDED THYROID BIOPSY    CLINICAL INDICATION: Abnormal thyroid.    COMMENT: The patient's recently identified nodule in the right lobe of the thyroid gland was localized. Nodule measures 0.9 x 0.9 x 0.7 cm. Following informed consent, cleansing and topical anesthesia of the skin, 5 separate 25-gauge fine needle  aspirates were performed under ultrasound guidance.    All samples were given to pathology for cytology and were placed in tubes for Afirma study as needed.    Material obtained was reviewed immediately by pathology and it was felt adequate samples had been obtained. The patient tolerated the procedure without complication and left the department in good condition with instructions as to how to proceed should  complications develop.      2019  echnique: A sonogram of the thyroid gland was performed assessing   gray-scale appearance and color doppler flow.     DISCUSSION: Comparison in May with prior thyroid ultrasound dated   2018.   The  right thyroid lobe measures 4.7 x 1.7 x 1.3 cm. The left thyroid   lobe measures 4.1 x 1.4 x 1.1 cm. Isthmus measures 4mm in maximum AP   diameter. There is a hypoechoic irregular marginated 1.2 cm mass in   the right superior thyroid previously measuring 1 cm. Given the   irregular margins as well as interval increase in size, this is mildly   suspicious in nature and biopsy is recommended. There is also a stable   1.3 cm hypoechoic well marginated mass in the left superior thyroid..     No adjacent enlarged lymph nodes are seen.     IMPRESSION:   1. Mildly suspicious 1.2 cm irregularly marginated hypoechoic nodule   in the right superior thyroid for which ultrasound guided FNA is   recommended.   2. Stable 1.3 cm well marginated left superior thyroid nodule.     U CONSENSUS PANEL RECOMMENDATIONS FOR THE BIOPSY OF THYROID NODULES:     1. Solid of 1cm or larger that have microcalcifications   2. Solid or nodules with coarse calcifications that are 1.5 cm or   larger   3. Mixed solid and cystic or almost entirely cystic with solid mural   component of 2cm or larger   4. Growing nodules   5. Suspicious adenopathy overrides these nodule features and should   prompt FNA of either the lymph node or the thyroid nodule.      Impression & Plan:    Problem List Items Addressed This Visit          Endocrine    Acquired hypothyroidism - Primary    Relevant Medications    levothyroxine (Euthyrox) 100 mcg tablet    Other Relevant Orders    Lipid panel    Hemoglobin A1C    Comprehensive metabolic panel    T4, free    TSH, 3rd generation    Multinodular thyroid    Relevant Medications    levothyroxine (Euthyrox) 100 mcg tablet    Other Relevant Orders    Lipid panel    Hemoglobin A1C    Comprehensive metabolic panel    T4, free    TSH, 3rd generation    Polycystic ovarian syndrome    Relevant Medications    levothyroxine (Euthyrox) 100 mcg tablet    Other Relevant Orders    Lipid panel    Hemoglobin A1C    Comprehensive  metabolic panel    T4, free    TSH, 3rd generation           Orders Placed This Encounter   Procedures    Lipid panel     This is a patient instruction: This test requires patient fasting for 10-12 hours or longer. Drinking of black coffee or black tea is acceptable.     Standing Status:   Future     Number of Occurrences:   1     Standing Expiration Date:   6/13/2025    Hemoglobin A1C     Standing Status:   Future     Number of Occurrences:   1     Standing Expiration Date:   6/13/2025    Comprehensive metabolic panel     This is a patient instruction: Patient fasting for 8 hours or longer recommended.     Standing Status:   Future     Number of Occurrences:   1     Standing Expiration Date:   6/13/2025    T4, free     Standing Status:   Standing     Number of Occurrences:   3     Standing Expiration Date:   6/13/2025    TSH, 3rd generation     This is a patient instruction: This test is non-fasting. Please drink two glasses of water morning of bloodwork.        Standing Status:   Standing     Number of Occurrences:   3     Standing Expiration Date:   6/13/2025       There are no Patient Instructions on file for this visit.    1.  Hypothyroidism, likely due to Hashimoto:- Currently post partum now.  Dose of levothyroxine increased during pregnancy, TSH was upto 174 post partum and hence increased dose from 50mcg to 75mcg. Did have clinical hypothyroidism post partum now. Still on slightly lower dose than weight based dosing. TSH has improved but still above goal. Therefore will increase levothyroxine to 100mcg and repeat labs in 6 weeks. Weight based dosing 125mcg but will make slow increase given previous hx of over replacement.       2. Multinodular thyroid- Non toxic, s/p FNA in 2019 of right thyroid nodule- neg FNA and s/p FNA right/Left 2.6cm TR4 nodules 05/23, Right nodule benign but left was non diagnostic. Repeat US 11/23 was stable with 2 stable nodules with no change in size. Repeat US in 1 year-  ordered     3) PCOS:- Dx in 2014, given irregular menses and hirsutism- most likely, PCOS mimickers are ruled out.   Menses- on IUD now  Hyperandrogenism- c/w Aldactone 50mg BID- reassess if increased dose needed next visit.   Metabolic profile- Lipid and A1C 11/23 normal, repeat in 1 year 11/24    RTC in 3 months    Discussed with the patient and all questioned fully answered. She will call me if any problems arise.    Counseled patient on diagnostic results, prognosis, risk and benefit of treatment options, instruction for management, importance of treatment compliance, Risk  factor reduction and impressions    Diane Zuleta MD

## 2024-06-14 ENCOUNTER — OFFICE VISIT (OUTPATIENT)
Dept: ENDOCRINOLOGY | Facility: HOSPITAL | Age: 29
End: 2024-06-14
Payer: COMMERCIAL

## 2024-06-14 ENCOUNTER — OFFICE VISIT (OUTPATIENT)
Dept: FAMILY MEDICINE CLINIC | Facility: CLINIC | Age: 29
End: 2024-06-14
Payer: COMMERCIAL

## 2024-06-14 VITALS
TEMPERATURE: 99 F | RESPIRATION RATE: 17 BRPM | SYSTOLIC BLOOD PRESSURE: 110 MMHG | HEIGHT: 63 IN | WEIGHT: 175.2 LBS | DIASTOLIC BLOOD PRESSURE: 74 MMHG | BODY MASS INDEX: 31.04 KG/M2 | OXYGEN SATURATION: 98 %

## 2024-06-14 VITALS
OXYGEN SATURATION: 98 % | DIASTOLIC BLOOD PRESSURE: 70 MMHG | HEIGHT: 63 IN | BODY MASS INDEX: 31.01 KG/M2 | HEART RATE: 94 BPM | WEIGHT: 175 LBS | SYSTOLIC BLOOD PRESSURE: 120 MMHG

## 2024-06-14 DIAGNOSIS — Z09 FOLLOW-UP EXAM: Primary | ICD-10-CM

## 2024-06-14 DIAGNOSIS — F41.9 ANXIETY: ICD-10-CM

## 2024-06-14 DIAGNOSIS — E28.2 POLYCYSTIC OVARIAN SYNDROME: ICD-10-CM

## 2024-06-14 DIAGNOSIS — F33.41 RECURRENT MAJOR DEPRESSIVE DISORDER, IN PARTIAL REMISSION (HCC): ICD-10-CM

## 2024-06-14 DIAGNOSIS — E03.9 ACQUIRED HYPOTHYROIDISM: Primary | ICD-10-CM

## 2024-06-14 DIAGNOSIS — E04.2 MULTINODULAR THYROID: ICD-10-CM

## 2024-06-14 PROCEDURE — 99213 OFFICE O/P EST LOW 20 MIN: CPT

## 2024-06-14 PROCEDURE — 99214 OFFICE O/P EST MOD 30 MIN: CPT | Performed by: STUDENT IN AN ORGANIZED HEALTH CARE EDUCATION/TRAINING PROGRAM

## 2024-06-14 RX ORDER — LEVOTHYROXINE SODIUM 0.1 MG/1
100 TABLET ORAL DAILY
Qty: 60 TABLET | Refills: 1 | Status: SHIPPED | OUTPATIENT
Start: 2024-06-14

## 2024-06-14 NOTE — ASSESSMENT & PLAN NOTE
The patient is doing well on current medications as prescribed. Her depression symptoms are well controlled. No current talk therapy. She reports she was given a hard time because she has state insurance. She is interested in talk therapy in the future. PHQ - 9 negative today. We will continue to monitor this. We will see her back as scheduled.

## 2024-06-14 NOTE — PROGRESS NOTES
Ambulatory Visit  Name: Lupis Kidd      : 1995      MRN: [id number]  Encounter Provider: CHUCHO Rodriguez  Encounter Date: 2024   Encounter department: [facility]    Assessment & Plan   1. Follow-up exam  2. Anxiety  Assessment & Plan:  The patient is doing well on duloxetine 60 mg. Continue medications as prescribed.   3. Recurrent major depressive disorder, in partial remission (HCC)  Assessment & Plan:  The patient is doing well on current medications as prescribed. Her depression symptoms are well controlled. No current talk therapy. She reports she was given a hard time because she has state insurance. She is interested in talk therapy in the future. PHQ - 9 negative today. We will continue to monitor this. We will see her back as scheduled.     Follow up as needed or at next regularly scheduled appointment.      Depression Screening and Follow-up Plan: Patient was screened for depression during today's encounter. They screened negative with a PHQ-9 score of 0.      History of Present Illness     Lupis Kidd is a 29 year old female who presents today for a follow up exam. She reports her depression symptoms are well controlled on her current medications.         Review of Systems   Constitutional: Negative.  Negative for chills, fatigue and fever.   HENT: Negative.  Negative for congestion, ear pain, rhinorrhea and sore throat.    Eyes: Negative.  Negative for pain and visual disturbance.   Respiratory: Negative.  Negative for cough and shortness of breath.    Cardiovascular: Negative.  Negative for chest pain, palpitations and leg swelling.   Gastrointestinal:  Negative for abdominal pain, constipation, diarrhea, nausea and vomiting.   Endocrine: Negative.    Genitourinary: Negative.  Negative for dysuria, frequency and urgency.   Musculoskeletal: Negative.  Negative for back pain and myalgias.   Skin: Negative.  Negative for rash.  "  Allergic/Immunologic: Negative.    Neurological: Negative.  Negative for dizziness, weakness, light-headedness and headaches.   Hematological: Negative.    Psychiatric/Behavioral: Negative.         Objective     /74 (BP Location: Left arm, Patient Position: Sitting, Cuff Size: Large)   Temp 99 °F (37.2 °C) (Tympanic)   Resp 17   Ht 5' 3\" (1.6 m)   Wt 79.5 kg (175 lb 3.2 oz)   SpO2 98%   BMI 31.04 kg/m²     Physical Exam  Vitals and nursing note reviewed.   Constitutional:       General: She is not in acute distress.     Appearance: Normal appearance. She is well-developed. She is not ill-appearing.   HENT:      Head: Normocephalic and atraumatic.   Eyes:      Conjunctiva/sclera: Conjunctivae normal.   Cardiovascular:      Rate and Rhythm: Normal rate and regular rhythm.      Heart sounds: Normal heart sounds. No murmur heard.  Pulmonary:      Effort: Pulmonary effort is normal. No respiratory distress.      Breath sounds: Normal breath sounds.   Musculoskeletal:      Cervical back: Normal range of motion and neck supple.   Skin:     General: Skin is warm and dry.      Capillary Refill: Capillary refill takes less than 2 seconds.   Neurological:      General: No focal deficit present.      Mental Status: She is alert and oriented to person, place, and time.   Psychiatric:         Mood and Affect: Mood normal.         Behavior: Behavior normal.       Administrative Statements     "

## 2024-07-18 ENCOUNTER — OFFICE VISIT (OUTPATIENT)
Dept: URGENT CARE | Facility: CLINIC | Age: 29
End: 2024-07-18
Payer: COMMERCIAL

## 2024-07-18 VITALS
TEMPERATURE: 98.8 F | OXYGEN SATURATION: 99 % | DIASTOLIC BLOOD PRESSURE: 72 MMHG | RESPIRATION RATE: 16 BRPM | SYSTOLIC BLOOD PRESSURE: 116 MMHG | HEART RATE: 80 BPM

## 2024-07-18 DIAGNOSIS — R21 RASH: Primary | ICD-10-CM

## 2024-07-18 PROCEDURE — 99213 OFFICE O/P EST LOW 20 MIN: CPT

## 2024-07-18 RX ORDER — PREDNISONE 10 MG/1
TABLET ORAL
Qty: 15 TABLET | Refills: 0 | Status: SHIPPED | OUTPATIENT
Start: 2024-07-18 | End: 2024-07-23

## 2024-07-18 NOTE — PROGRESS NOTES
[facility]        NAME: Lupis Kidd is a 29 y.o. female  : 1995    MRN: [id number]  DATE: 2024  TIME: 6:16 PM    Assessment and Plan   Rash [R21]  1. Rash  predniSONE 10 mg tablet            Patient Instructions       Follow up with PCP in 3-5 days.  Proceed to  ER if symptoms worsen.    If tests have been performed at [facility], our office will contact you with results if changes need to be made to the care plan discussed with you at the visit.  [facility]    Chief Complaint     Chief Complaint   Patient presents with    Rash     Pt reports an itchy rash on her b/l arms x8 days. Today noted an itchy rash on her right thigh. Denies new soaps/lotions/detergents. Using hydrocortisone cream & benadryl.          History of Present Illness       28 y/o F presents for pruritic rash that started 8 days ago.  She stated that she was at work dusting wearing a tank top when she started to have a itch on bilateral triceps.  She said that over the course of the last 8 days it had also spread to her posterior ankles and also her upper inner thighs.  The rash is described as intensely pruritic but not painful.  She denies any recent illness, URI-like symptoms, new soaps or detergents, being in new environments, or any known contact allergies in the past.        Review of Systems   Review of Systems   Constitutional:  Negative for chills and fever.   HENT:  Negative for congestion, ear pain, rhinorrhea and sore throat.    Skin:  Positive for rash.         Current Medications       Current Outpatient Medications:     buPROPion (WELLBUTRIN XL) 150 mg 24 hr tablet, Take 1 tablet (150 mg total) by mouth every morning, Disp: 30 tablet, Rfl: 5    DULoxetine (CYMBALTA) 60 mg delayed release capsule, Take 1 capsule (60 mg total) by mouth every morning, Disp: 30 capsule, Rfl: 3    Levonorgestrel (MIRENA) 20 MCG/DAY IUD, 1 each by Intrauterine route once, Disp: ,  Rfl:     levothyroxine (Euthyrox) 100 mcg tablet, Take 1 tablet (100 mcg total) by mouth daily, Disp: 60 tablet, Rfl: 1    predniSONE 10 mg tablet, Take 5 tablets (50 mg total) by mouth daily for 1 day, THEN 4 tablets (40 mg total) daily for 1 day, THEN 3 tablets (30 mg total) daily for 1 day, THEN 2 tablets (20 mg total) daily for 1 day, THEN 1 tablet (10 mg total) daily for 1 day., Disp: 15 tablet, Rfl: 0    spironolactone (ALDACTONE) 50 mg tablet, Take 1 tablet (50 mg total) by mouth 2 (two) times a day, Disp: 200 tablet, Rfl: 1    valACYclovir (VALTREX) 500 mg tablet, if needed Takes when needed, Disp: , Rfl:     Current Allergies     Allergies as of 2024    (No Known Allergies)            The following portions of the patient's history were reviewed and updated as appropriate: allergies, current medications, past family history, past medical history, past social history, past surgical history and problem list.     Past Medical History:   Diagnosis Date    Anxiety and depression     previously on Cymbalta, stopped before 2022 due to cost of visits. Currently no medication and self-manage.    Depression     Factor 5 Leiden mutation, heterozygous (HCC)     Genital herpes     Last out break per pt months ago (23). On Valtrex only w/outbreak.    History of loop electrical excision procedure (LEEP) 2019    Human papilloma virus     Hypothyroid     Currently on Levothyroxine 25mg and in the process of finding a new endocrinologist. Does not remember when last TSH check.    Miscarriage 20    This was my first pregnancy. The miscarriage started Friday, 22    PCOS (polycystic ovarian syndrome)     Currently on metformin 500 mg x2 daily.     delivery     Precipitously delivered at 35w0d in [facility] upon arrival to hospital       Past Surgical History:   Procedure Laterality Date    CERVICAL BIOPSY  W/ LOOP ELECTRODE EXCISION      US GUIDED THYROID BIOPSY  2023    US GUIDED  THYROID BIOPSY  8/9/2019    WISDOM TOOTH EXTRACTION         Family History   Problem Relation Age of Onset    Thyroid disease Mother     Hypothyroidism Mother     Depression Father     Cancer Father         Multiple Myloma    Anxiety disorder Father     Deep vein thrombosis Father          Medications have been verified.        Objective   /72   Pulse 80   Temp 98.8 °F (37.1 °C)   Resp 16   SpO2 99%   No LMP recorded. (Menstrual status: Birth Control).       Physical Exam     Physical Exam  Vitals and nursing note reviewed.   Constitutional:       General: She is not in acute distress.     Appearance: She is not toxic-appearing.   HENT:      Head: Normocephalic and atraumatic.   Eyes:      Conjunctiva/sclera: Conjunctivae normal.   Pulmonary:      Effort: Pulmonary effort is normal.   Skin:     Findings: Rash present.   Neurological:      Mental Status: She is alert.   Psychiatric:         Mood and Affect: Mood normal.         Behavior: Behavior normal.

## 2024-10-15 ENCOUNTER — OFFICE VISIT (OUTPATIENT)
Dept: FAMILY MEDICINE CLINIC | Facility: CLINIC | Age: 29
End: 2024-10-15
Payer: COMMERCIAL

## 2024-10-15 VITALS
HEIGHT: 63 IN | SYSTOLIC BLOOD PRESSURE: 108 MMHG | HEART RATE: 96 BPM | DIASTOLIC BLOOD PRESSURE: 70 MMHG | RESPIRATION RATE: 16 BRPM | BODY MASS INDEX: 31.86 KG/M2 | TEMPERATURE: 99.3 F | WEIGHT: 179.8 LBS | OXYGEN SATURATION: 98 %

## 2024-10-15 DIAGNOSIS — F33.41 RECURRENT MAJOR DEPRESSIVE DISORDER, IN PARTIAL REMISSION (HCC): Primary | ICD-10-CM

## 2024-10-15 DIAGNOSIS — F41.9 ANXIETY: ICD-10-CM

## 2024-10-15 PROCEDURE — 99214 OFFICE O/P EST MOD 30 MIN: CPT | Performed by: FAMILY MEDICINE

## 2024-10-15 RX ORDER — BUPROPION HYDROCHLORIDE 300 MG/1
300 TABLET ORAL EVERY MORNING
Qty: 30 TABLET | Refills: 5 | Status: SHIPPED | OUTPATIENT
Start: 2024-10-15 | End: 2025-04-13

## 2024-10-15 RX ORDER — DULOXETIN HYDROCHLORIDE 60 MG/1
60 CAPSULE, DELAYED RELEASE ORAL EVERY MORNING
Qty: 30 CAPSULE | Refills: 5 | Status: SHIPPED | OUTPATIENT
Start: 2024-10-15

## 2024-10-15 NOTE — ASSESSMENT & PLAN NOTE
Patient's anxiety is stable on the current dose of her duloxetine.  We have made no changes today.  We will see her back as scheduled.  Orders:    DULoxetine (CYMBALTA) 60 mg delayed release capsule; Take 1 capsule (60 mg total) by mouth every morning

## 2024-10-15 NOTE — PROGRESS NOTES
Ambulatory Visit  Name: Lupis Kidd      : 1995      MRN: [id number]  Encounter Provider: Yee Deal DO  Encounter Date: 10/15/2024   Encounter department: [facility]    Assessment & Plan  Recurrent major depressive disorder, in partial remission (HCC)  Depression Screening Follow-up Plan: Patient's depression screening was positive with a PHQ-9 score of 11.   The patient is having increasing depression symptoms which is largely situational.  We are going to try titrating up on the dose of Wellbutrin to 300 mg to see that better controls her symptoms.  We will keep her other medications the same.  We will see her back as scheduled.  Orders:    buPROPion (WELLBUTRIN XL) 300 mg 24 hr tablet; Take 1 tablet (300 mg total) by mouth every morning    DULoxetine (CYMBALTA) 60 mg delayed release capsule; Take 1 capsule (60 mg total) by mouth every morning    Anxiety  Patient's anxiety is stable on the current dose of her duloxetine.  We have made no changes today.  We will see her back as scheduled.  Orders:    DULoxetine (CYMBALTA) 60 mg delayed release capsule; Take 1 capsule (60 mg total) by mouth every morning      Depression Screening and Follow-up Plan: Patient's depression screening was positive with a PHQ-9 score of 11. Patient assessed for underlying major depression. Brief counseling provided and recommend additional follow-up/re-evaluation next office visit.       Chief Complaint   Patient presents with    Follow-up     4 month review Duloxetine 60 mg       History of Present Illness     Lupis Kidd is a 29 y.o. female [place] today for follow-up of her depression anxiety.  She is complained of increasing depression symptoms related to her job and stress she is experiencing.  She feels tired all the time.  She denies any panic attacks.    She is having a lot of stress at work-she is trying to get her old job back- she feels that nothing is helping her.    She  is very tired.    The patient denies any chest pain, shortness of breath, or palpitations.  There is no APPIAH, PND, or orthopnea.  There is no edema.  There are no headaches or visual changes.  There is no lightheadedness, dizziness, or fainting spells.    There are no panic attacks.  The patient currently denies any nausea, vomiting, or GERD symptoms.  she has normal bowel movements and normal urine output.  she has a normal appetite.    She had her menses.      Anxiety  Presents for follow-up visit. Symptoms include depressed mood and nervous/anxious behavior. Patient reports no chest pain, compulsions, confusion, decreased concentration, dizziness, dry mouth, excessive worry, feeling of choking, hyperventilation, impotence, insomnia, irritability, malaise, muscle tension, nausea, obsessions, palpitations, panic, restlessness, shortness of breath or suicidal ideas.           History obtained from : patient  Review of Systems   Constitutional: Negative.  Negative for irritability.   HENT: Negative.     Eyes: Negative.    Respiratory: Negative.  Negative for shortness of breath.    Cardiovascular: Negative.  Negative for chest pain and palpitations.   Gastrointestinal: Negative.  Negative for nausea.   Endocrine: Negative.    Genitourinary: Negative.  Negative for impotence.   Musculoskeletal: Negative.    Skin: Negative.    Allergic/Immunologic: Negative.    Neurological: Negative.  Negative for dizziness.   Hematological: Negative.    Psychiatric/Behavioral:  Negative for confusion, decreased concentration and suicidal ideas. The patient is nervous/anxious. The patient does not have insomnia.      Medical History Reviewed by provider this encounter:  Tobacco  Allergies  Meds  Problems  Med Hx  Surg Hx  Fam Hx       Current Outpatient Medications on File Prior to Visit   Medication Sig Dispense Refill    Levonorgestrel (MIRENA) 20 MCG/DAY IUD 1 each by Intrauterine route once      levothyroxine (Euthyrox) 100  "mcg tablet Take 1 tablet (100 mcg total) by mouth daily 60 tablet 1    spironolactone (ALDACTONE) 50 mg tablet Take 1 tablet (50 mg total) by mouth 2 (two) times a day 200 tablet 1    valACYclovir (VALTREX) 500 mg tablet if needed Takes when needed       No current facility-administered medications on file prior to visit.      Social History     Tobacco Use    Smoking status: Former     Current packs/day: 0.00     Average packs/day: 0.3 packs/day for 2.0 years (0.5 ttl pk-yrs)     Types: Cigarettes     Quit date: 2023     Years since quittin.6     Passive exposure: Never    Smokeless tobacco: Never    Tobacco comments:     As soon as I found out that I was pregnant, I completely stopped smoking.   Vaping Use    Vaping status: Never Used   Substance and Sexual Activity    Alcohol use: Not Currently     Comment: I rarely drink    Drug use: Never    Sexual activity: Yes     Partners: Male     Birth control/protection: None     Comment: no new partner in past year         Objective     /70 (BP Location: Left arm, Patient Position: Sitting, Cuff Size: Large)   Pulse 96   Temp 99.3 °F (37.4 °C) (Tympanic)   Resp 16   Ht 5' 3\" (1.6 m)   Wt 81.6 kg (179 lb 12.8 oz)   SpO2 98%   BMI 31.85 kg/m²     Physical Exam  Constitutional:       Appearance: She is well-developed.   HENT:      Head: Normocephalic and atraumatic.      Right Ear: Tympanic membrane, ear canal and external ear normal.      Left Ear: Tympanic membrane, ear canal and external ear normal.      Nose: Nose normal.      Mouth/Throat:      Mouth: Mucous membranes are moist.      Pharynx: No oropharyngeal exudate.   Eyes:      Extraocular Movements: Extraocular movements intact.      Conjunctiva/sclera: Conjunctivae normal.      Pupils: Pupils are equal, round, and reactive to light.   Neck:      Thyroid: No thyromegaly.      Vascular: No JVD.      Trachea: No tracheal deviation.   Cardiovascular:      Rate and Rhythm: Normal rate and regular " rhythm.      Pulses: Normal pulses.      Heart sounds: Normal heart sounds. No murmur heard.     No friction rub. No gallop.   Pulmonary:      Effort: Pulmonary effort is normal. No respiratory distress.      Breath sounds: Normal breath sounds. No stridor. No wheezing or rales.   Chest:      Chest wall: No tenderness.   Abdominal:      General: Bowel sounds are normal. There is no distension.      Palpations: Abdomen is soft. There is no mass.      Tenderness: There is no abdominal tenderness. There is no guarding or rebound.   Musculoskeletal:         General: No swelling, tenderness, deformity or signs of injury. Normal range of motion.      Cervical back: Normal range of motion.      Right lower leg: No edema.      Left lower leg: No edema.   Lymphadenopathy:      Cervical: No cervical adenopathy.   Skin:     General: Skin is warm and dry.   Neurological:      General: No focal deficit present.      Mental Status: She is alert and oriented to person, place, and time.      Cranial Nerves: No cranial nerve deficit.      Motor: No abnormal muscle tone.      Coordination: Coordination normal.      Deep Tendon Reflexes: Reflexes are normal and symmetric. Reflexes normal.       Administrative Statements   I have spent a total time of 20 minutes in caring for this patient on the day of the visit/encounter including Diagnostic results, Prognosis, Patient and family education, Importance of tx compliance, Risk factor reductions, Impressions, Documenting in the medical record, Obtaining or reviewing history  , and Communicating with other healthcare professionals .

## 2024-10-15 NOTE — ASSESSMENT & PLAN NOTE
Depression Screening Follow-up Plan: Patient's depression screening was positive with a PHQ-9 score of 11.   The patient is having increasing depression symptoms which is largely situational.  We are going to try titrating up on the dose of Wellbutrin to 300 mg to see that better controls her symptoms.  We will keep her other medications the same.  We will see her back as scheduled.  Orders:    buPROPion (WELLBUTRIN XL) 300 mg 24 hr tablet; Take 1 tablet (300 mg total) by mouth every morning    DULoxetine (CYMBALTA) 60 mg delayed release capsule; Take 1 capsule (60 mg total) by mouth every morning

## 2024-11-30 ENCOUNTER — TELEMEDICINE (OUTPATIENT)
Dept: OTHER | Facility: HOSPITAL | Age: 29
End: 2024-11-30
Payer: COMMERCIAL

## 2024-11-30 DIAGNOSIS — N30.00 ACUTE CYSTITIS WITHOUT HEMATURIA: Primary | ICD-10-CM

## 2024-11-30 PROCEDURE — 99213 OFFICE O/P EST LOW 20 MIN: CPT | Performed by: PHYSICIAN ASSISTANT

## 2024-11-30 RX ORDER — CEPHALEXIN 500 MG/1
500 CAPSULE ORAL EVERY 12 HOURS SCHEDULED
Qty: 14 CAPSULE | Refills: 0 | Status: SHIPPED | OUTPATIENT
Start: 2024-11-30 | End: 2024-12-07

## 2024-11-30 NOTE — PROGRESS NOTES
Virtual Regular Visit  Name: Lupis Kidd      : 1995      MRN: [id number]  Encounter Provider: Your Video Visit Provider  Encounter Date: 2024   Encounter department: [facility]IRTUAL CARE       Verification of patient location:  Patient is located at Home in the following state in which I hold an active license [place] :  Assessment & Plan  Acute cystitis without hematuria    Orders:    cephalexin (KEFLEX) 500 mg capsule; Take 1 capsule (500 mg total) by mouth every 12 (twelve) hours for 7 days        Encounter provider Your Video Visit Provider    The patient was identified by name and date of birth. Lupis Kidd was informed that this is a telemedicine visit and that the visit is being conducted through the Epic Embedded platform. She agrees to proceed..  My office door was closed. No one else was in the room.  She acknowledged consent and understanding of privacy and security of the video platform. The patient has agreed to participate and understands they can discontinue the visit at any time.    Patient is aware this is a billable service.     History of Present Illness     Patient states that she has a UTI.  She is having frequency and burning x 2. She denies any fevers, flank pain.       Review of Systems   Constitutional: Negative.    HENT: Negative.     Respiratory: Negative.     Cardiovascular: Negative.    Gastrointestinal: Negative.    Genitourinary:  Positive for dysuria, frequency and urgency. Negative for flank pain.   Musculoskeletal: Negative.    Neurological: Negative.    Psychiatric/Behavioral: Negative.         Objective   There were no vitals taken for this visit.    Physical Exam  Constitutional:       General: She is not in acute distress.     Appearance: Normal appearance. She is not ill-appearing, toxic-appearing or diaphoretic.   HENT:      Head: Normocephalic and atraumatic.   Pulmonary:      Effort: Pulmonary effort is normal.   Abdominal:      General: Abdomen is flat.       Tenderness: There is no abdominal tenderness. There is no right CVA tenderness, left CVA tenderness, guarding or rebound.   Skin:     General: Skin is dry.   Neurological:      General: No focal deficit present.      Mental Status: She is alert and oriented to person, place, and time.   Psychiatric:         Mood and Affect: Mood normal.         Behavior: Behavior normal.         Visit Time  Total Visit Duration: 5

## 2024-12-02 ENCOUNTER — RESULTS FOLLOW-UP (OUTPATIENT)
Dept: ENDOCRINOLOGY | Facility: HOSPITAL | Age: 29
End: 2024-12-02

## 2024-12-02 ENCOUNTER — TELEPHONE (OUTPATIENT)
Age: 29
End: 2024-12-02

## 2024-12-02 NOTE — TELEPHONE ENCOUNTER
Pt called in and stated if someone can send her a [facility] message in regards to her results. She reviewed them and knows that something is off. Please review and message pt as soon as possible.

## 2024-12-12 DIAGNOSIS — E28.2 POLYCYSTIC OVARIAN SYNDROME: ICD-10-CM

## 2024-12-12 DIAGNOSIS — E03.9 ACQUIRED HYPOTHYROIDISM: ICD-10-CM

## 2024-12-12 RX ORDER — LEVOTHYROXINE SODIUM 100 UG/1
100 TABLET ORAL DAILY
Qty: 90 TABLET | Refills: 1 | Status: SHIPPED | OUTPATIENT
Start: 2024-12-12

## 2024-12-21 ENCOUNTER — HOSPITAL ENCOUNTER (OUTPATIENT)
Dept: ULTRASOUND IMAGING | Facility: HOSPITAL | Age: 29
Discharge: HOME | End: 2024-12-21
Payer: COMMERCIAL

## 2024-12-21 DIAGNOSIS — E04.2 MULTINODULAR THYROID: ICD-10-CM

## 2024-12-21 PROCEDURE — 76536 US EXAM OF HEAD AND NECK: CPT

## 2024-12-25 ENCOUNTER — HOSPITAL ENCOUNTER (EMERGENCY)
Facility: HOSPITAL | Age: 29
Discharge: HOME | End: 2024-12-25
Attending: EMERGENCY MEDICINE
Payer: COMMERCIAL

## 2024-12-25 ENCOUNTER — APPOINTMENT (EMERGENCY)
Dept: CT IMAGING | Facility: HOSPITAL | Age: 29
End: 2024-12-25
Payer: COMMERCIAL

## 2024-12-25 VITALS
BODY MASS INDEX: 31.01 KG/M2 | OXYGEN SATURATION: 97 % | WEIGHT: 175 LBS | HEIGHT: 63 IN | TEMPERATURE: 97.3 F | DIASTOLIC BLOOD PRESSURE: 77 MMHG | HEART RATE: 65 BPM | SYSTOLIC BLOOD PRESSURE: 109 MMHG | RESPIRATION RATE: 18 BRPM

## 2024-12-25 DIAGNOSIS — K76.89 LIVER CYST: ICD-10-CM

## 2024-12-25 DIAGNOSIS — N28.1 RENAL CYST: ICD-10-CM

## 2024-12-25 DIAGNOSIS — R10.9 ABDOMINAL PAIN: Primary | ICD-10-CM

## 2024-12-25 DIAGNOSIS — N39.0 UTI (URINARY TRACT INFECTION): ICD-10-CM

## 2024-12-25 DIAGNOSIS — N30.90 CYSTITIS: ICD-10-CM

## 2024-12-25 LAB
ALBUMIN SERPL BCG-MCNC: 4.4 G/DL (ref 3.5–5)
ALP SERPL-CCNC: 49 U/L (ref 34–104)
ALT SERPL W P-5'-P-CCNC: 52 U/L (ref 7–52)
AMORPH URATE CRY URNS QL MICRO: ABNORMAL
ANION GAP SERPL CALCULATED.3IONS-SCNC: 4 MMOL/L (ref 4–13)
AST SERPL W P-5'-P-CCNC: 51 U/L (ref 13–39)
BACTERIA UR QL AUTO: ABNORMAL /HPF
BASOPHILS # BLD AUTO: 0.06 THOUSANDS/ÂΜL (ref 0–0.1)
BASOPHILS NFR BLD AUTO: 1 % (ref 0–1)
BILIRUB SERPL-MCNC: 0.47 MG/DL (ref 0.2–1)
BILIRUB UR QL STRIP: NEGATIVE
BUN SERPL-MCNC: 13 MG/DL (ref 5–25)
CALCIUM SERPL-MCNC: 9.1 MG/DL (ref 8.4–10.2)
CHLORIDE SERPL-SCNC: 103 MMOL/L (ref 96–108)
CLARITY UR: ABNORMAL
CO2 SERPL-SCNC: 33 MMOL/L (ref 21–32)
COLOR UR: YELLOW
CREAT SERPL-MCNC: 0.88 MG/DL (ref 0.6–1.3)
EOSINOPHIL # BLD AUTO: 0.27 THOUSAND/ÂΜL (ref 0–0.61)
EOSINOPHIL NFR BLD AUTO: 3 % (ref 0–6)
ERYTHROCYTE [DISTWIDTH] IN BLOOD BY AUTOMATED COUNT: 12.8 % (ref 11.6–15.1)
EXT PREGNANCY TEST URINE: NEGATIVE
EXT. CONTROL: NORMAL
GFR SERPL CREATININE-BSD FRML MDRD: 87 ML/MIN/1.73SQ M
GLUCOSE SERPL-MCNC: 97 MG/DL (ref 65–140)
GLUCOSE UR STRIP-MCNC: NEGATIVE MG/DL
HCT VFR BLD AUTO: 42.7 % (ref 34.8–46.1)
HGB BLD-MCNC: 14 G/DL (ref 11.5–15.4)
HGB UR QL STRIP.AUTO: NEGATIVE
IMM GRANULOCYTES # BLD AUTO: 0.02 THOUSAND/UL (ref 0–0.2)
IMM GRANULOCYTES NFR BLD AUTO: 0 % (ref 0–2)
KETONES UR STRIP-MCNC: NEGATIVE MG/DL
LEUKOCYTE ESTERASE UR QL STRIP: NEGATIVE
LIPASE SERPL-CCNC: 32 U/L (ref 11–82)
LYMPHOCYTES # BLD AUTO: 2.79 THOUSANDS/ÂΜL (ref 0.6–4.47)
LYMPHOCYTES NFR BLD AUTO: 35 % (ref 14–44)
MCH RBC QN AUTO: 30.8 PG (ref 26.8–34.3)
MCHC RBC AUTO-ENTMCNC: 32.8 G/DL (ref 31.4–37.4)
MCV RBC AUTO: 94 FL (ref 82–98)
MONOCYTES # BLD AUTO: 0.59 THOUSAND/ÂΜL (ref 0.17–1.22)
MONOCYTES NFR BLD AUTO: 7 % (ref 4–12)
MUCOUS THREADS UR QL AUTO: ABNORMAL
NEUTROPHILS # BLD AUTO: 4.3 THOUSANDS/ÂΜL (ref 1.85–7.62)
NEUTS SEG NFR BLD AUTO: 54 % (ref 43–75)
NITRITE UR QL STRIP: NEGATIVE
NON-SQ EPI CELLS URNS QL MICRO: ABNORMAL /HPF
NRBC BLD AUTO-RTO: 0 /100 WBCS
PH UR STRIP.AUTO: 8 [PH]
PLATELET # BLD AUTO: 380 THOUSANDS/UL (ref 149–390)
PMV BLD AUTO: 8.5 FL (ref 8.9–12.7)
POTASSIUM SERPL-SCNC: 4.3 MMOL/L (ref 3.5–5.3)
PROT SERPL-MCNC: 7 G/DL (ref 6.4–8.4)
PROT UR STRIP-MCNC: ABNORMAL MG/DL
RBC # BLD AUTO: 4.55 MILLION/UL (ref 3.81–5.12)
RBC #/AREA URNS AUTO: ABNORMAL /HPF
SODIUM SERPL-SCNC: 140 MMOL/L (ref 135–147)
SP GR UR STRIP.AUTO: 1.02 (ref 1–1.03)
UROBILINOGEN UR STRIP-ACNC: <2 MG/DL
WBC # BLD AUTO: 8.03 THOUSAND/UL (ref 4.31–10.16)
WBC #/AREA URNS AUTO: ABNORMAL /HPF

## 2024-12-25 PROCEDURE — 85025 COMPLETE CBC W/AUTO DIFF WBC: CPT | Performed by: EMERGENCY MEDICINE

## 2024-12-25 PROCEDURE — 36415 COLL VENOUS BLD VENIPUNCTURE: CPT

## 2024-12-25 PROCEDURE — 96361 HYDRATE IV INFUSION ADD-ON: CPT

## 2024-12-25 PROCEDURE — 99284 EMERGENCY DEPT VISIT MOD MDM: CPT

## 2024-12-25 PROCEDURE — 83690 ASSAY OF LIPASE: CPT | Performed by: EMERGENCY MEDICINE

## 2024-12-25 PROCEDURE — 96375 TX/PRO/DX INJ NEW DRUG ADDON: CPT

## 2024-12-25 PROCEDURE — 80053 COMPREHEN METABOLIC PANEL: CPT | Performed by: EMERGENCY MEDICINE

## 2024-12-25 PROCEDURE — 74177 CT ABD & PELVIS W/CONTRAST: CPT

## 2024-12-25 PROCEDURE — 81001 URINALYSIS AUTO W/SCOPE: CPT

## 2024-12-25 PROCEDURE — 81025 URINE PREGNANCY TEST: CPT

## 2024-12-25 PROCEDURE — 96374 THER/PROPH/DIAG INJ IV PUSH: CPT

## 2024-12-25 PROCEDURE — 99285 EMERGENCY DEPT VISIT HI MDM: CPT

## 2024-12-25 RX ORDER — FAMOTIDINE 10 MG/ML
20 INJECTION, SOLUTION INTRAVENOUS DAILY
Status: DISCONTINUED | OUTPATIENT
Start: 2024-12-25 | End: 2024-12-26 | Stop reason: HOSPADM

## 2024-12-25 RX ORDER — KETOROLAC TROMETHAMINE 30 MG/ML
15 INJECTION, SOLUTION INTRAMUSCULAR; INTRAVENOUS ONCE
Status: COMPLETED | OUTPATIENT
Start: 2024-12-25 | End: 2024-12-25

## 2024-12-25 RX ORDER — CEPHALEXIN 500 MG/1
500 CAPSULE ORAL EVERY 12 HOURS SCHEDULED
Qty: 14 CAPSULE | Refills: 0 | Status: SHIPPED | OUTPATIENT
Start: 2024-12-25 | End: 2025-01-01

## 2024-12-25 RX ADMIN — CEPHALEXIN 500 MG: 250 CAPSULE ORAL at 23:44

## 2024-12-25 RX ADMIN — KETOROLAC TROMETHAMINE 15 MG: 30 INJECTION, SOLUTION INTRAMUSCULAR; INTRAVENOUS at 20:52

## 2024-12-25 RX ADMIN — IOHEXOL 100 ML: 350 INJECTION, SOLUTION INTRAVENOUS at 21:51

## 2024-12-25 RX ADMIN — FAMOTIDINE 20 MG: 10 INJECTION, SOLUTION INTRAVENOUS at 21:21

## 2024-12-25 RX ADMIN — SODIUM CHLORIDE 1000 ML: 0.9 INJECTION, SOLUTION INTRAVENOUS at 20:52

## 2024-12-26 ENCOUNTER — RESULTS FOLLOW-UP (OUTPATIENT)
Dept: ENDOCRINOLOGY | Facility: CLINIC | Age: 29
End: 2024-12-26

## 2024-12-26 NOTE — RESULT ENCOUNTER NOTE
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  The thyroid ultrasound shows a stable nodule.  Repeat ultrasound in 2 years.

## 2024-12-26 NOTE — ED PROVIDER NOTES
Time reflects when diagnosis was documented in both MDM as applicable and the Disposition within this note       Time User Action Codes Description Comment    12/25/2024 11:28 PM LogantamikoNoemi Add [R10.9] Abdominal pain     12/25/2024 11:33 PM Noemi Fernandez Add [K76.89] Liver cyst     12/25/2024 11:33 PM LogantamikoTiburcioNoemi M Add [N28.1] Renal cyst     12/25/2024 11:34 PM JimNoemi Add [N39.0] UTI (urinary tract infection)     12/25/2024 11:36 PM MorgangiovanaNoemi niño Add [N30.90] Cystitis           ED Disposition       ED Disposition   Discharge    Condition   Stable    Date/Time   Wed Dec 25, 2024 11:38 PM    Comment   Lupis Kidd discharge to home/self care.                   Assessment & Plan       Medical Decision Making  DDx: acute cholecystitis, pancreatitis, electro abnormality  Patient arriving with upper abdominal pain.  Will order abdominal pain workup, CT abdomen pelvis.  Patient not pregnant.  Will provide analgesia, fluids.  Blood work: No leukocytosis, no anemia.  No VIRGILIO. No electrolyte abnormality. LFT WNL.    Correlating patient's UA to CAT scan, will treat for UTI.  Aware to follow-up with PCP for incidental findings.  Discussed that without appropriate follow-up diagnosis such as benign to cancer can be missed.  Patient is stable for outpatient follow-up.  Reviewed reasons to return to ed.  Patient verbalized understanding of diagnosis and agreement with discharge plan of care as well as understanding of reasons to return to ed.      Amount and/or Complexity of Data Reviewed  Labs: ordered.  Radiology: ordered.    Risk  Prescription drug management.             Medications   sodium chloride 0.9 % bolus 1,000 mL (0 mL Intravenous Stopped 12/25/24 2152)   ketorolac (TORADOL) injection 15 mg (15 mg Intravenous Given 12/25/24 2052)   iohexol (OMNIPAQUE) 350 MG/ML injection (MULTI-DOSE) 100 mL (100 mL Intravenous Given 12/25/24 2151)   cephalexin (KEFLEX) capsule 500 mg (500 mg Oral  Given 24 1544)       ED Risk Strat Scores                                              History of Present Illness       Chief Complaint   Patient presents with    Abdominal Pain     Pt c/o an hour ago napping and woke up with pain in her upper abdomen. Reports that she is scared that it is her gallbladder. Denies N/V/D. Denies chest/back pain.       Past Medical History:   Diagnosis Date    Anxiety and depression     previously on Cymbalta, stopped before 2022 due to cost of visits. Currently no medication and self-manage.    Depression     Factor 5 Leiden mutation, heterozygous (HCC)     Genital herpes     Last out break per pt months ago (23). On Valtrex only w/outbreak.    History of loop electrical excision procedure (LEEP) 2019    Human papilloma virus     Hypothyroid     Currently on Levothyroxine 25mg and in the process of finding a new endocrinologist. Does not remember when last TSH check.    Miscarriage 20    This was my first pregnancy. The miscarriage started Friday, 22    PCOS (polycystic ovarian syndrome)     Currently on metformin 500 mg x2 daily.     delivery 2023    Precipitously delivered at 35w0d in [facility] upon arrival to hospital      Past Surgical History:   Procedure Laterality Date    CERVICAL BIOPSY  W/ LOOP ELECTRODE EXCISION      US GUIDED THYROID BIOPSY  2023    US GUIDED THYROID BIOPSY  2019    WISDOM TOOTH EXTRACTION        Family History   Problem Relation Age of Onset    Thyroid disease Mother     Hypothyroidism Mother     Depression Father     Cancer Father         Multiple Myloma    Anxiety disorder Father     Deep vein thrombosis Father       Social History     Tobacco Use    Smoking status: Former     Current packs/day: 0.00     Average packs/day: 0.3 packs/day for 2.0 years (0.5 ttl pk-yrs)     Types: Cigarettes     Quit date: 2023     Years since quittin.8     Passive exposure: Never    Smokeless tobacco: Never     "Tobacco comments:     As soon as I found out that I was pregnant, I completely stopped smoking.   Vaping Use    Vaping status: Never Used   Substance Use Topics    Alcohol use: Not Currently     Comment: I rarely drink    Drug use: Never      E-Cigarette/Vaping    E-Cigarette Use Never User     Quit Date 1/31/23       E-Cigarette/Vaping Substances    Nicotine No     THC No     CBD No     Flavoring No     Other No     Unknown No       I have reviewed and agree with the history as documented.     Patient is a 29-year-old female past medical history of hypothyroidism arriving for evaluation of upper abdominal pain..  Patient states that this pain started earlier today when she woke up from a nap.  Patient reports no chest pain, no shortness of breath, no pleuritic pain.  Patient reports that she has no nausea, no vomiting, no diarrhea.  Patient reports that the pain was, \"worse than labor.\"  Patient denies any surgical history on her abdomen.  Patient denies any excessive alcohol use.        Review of Systems   Constitutional: Negative.    HENT: Negative.     Eyes: Negative.    Respiratory: Negative.     Cardiovascular: Negative.    Gastrointestinal:  Positive for abdominal pain.   Endocrine: Negative.    Genitourinary: Negative.    Musculoskeletal: Negative.    Skin: Negative.    Allergic/Immunologic: Negative.    Neurological: Negative.    Hematological: Negative.    Psychiatric/Behavioral: Negative.     All other systems reviewed and are negative.          Objective       ED Triage Vitals [12/25/24 1926]   Temperature Pulse Blood Pressure Respirations SpO2 Patient Position - Orthostatic VS   (!) 97.3 °F (36.3 °C) 82 121/90 18 99 % Sitting      Temp Source Heart Rate Source BP Location FiO2 (%) Pain Score    Oral Monitor Right arm -- 10 - Worst Possible Pain      Vitals      Date and Time Temp Pulse SpO2 Resp BP Pain Score FACES Pain Rating User   12/25/24 2300 -- 65 97 % 18 109/77 -- -- TH   12/25/24 2200 -- 78 98 % " 17 105/80 -- --    12/25/24 2122 -- -- -- -- -- No Pain --    12/25/24 2115 -- 67 100 % 18 129/76 -- --    12/25/24 2052 -- -- -- -- -- 3 --    12/25/24 1926 97.3 °F (36.3 °C) 82 99 % 18 121/90 10 - Worst Possible Pain -- [place]            Physical Exam  Vitals and nursing note reviewed.   Constitutional:       Appearance: She is well-developed and normal weight.   HENT:      Head: Normocephalic.      Mouth/Throat:      Mouth: Mucous membranes are moist.   Eyes:      Extraocular Movements: Extraocular movements intact.      Pupils: Pupils are equal, round, and reactive to light.   Cardiovascular:      Rate and Rhythm: Normal rate and regular rhythm.      Heart sounds: Normal heart sounds.   Pulmonary:      Effort: Pulmonary effort is normal.      Breath sounds: Normal breath sounds.   Abdominal:      General: Abdomen is flat. Bowel sounds are normal.      Palpations: Abdomen is soft.      Tenderness: There is abdominal tenderness in the epigastric area.       Skin:     General: Skin is warm.      Capillary Refill: Capillary refill takes less than 2 seconds.   Neurological:      Mental Status: She is alert and oriented to person, place, and time.   Psychiatric:         Mood and Affect: Mood normal.         Behavior: Behavior normal.         Results Reviewed       Procedure Component Value Units Date/Time    Urine Microscopic [[id number]]  (Abnormal) Collected: 12/25/24 2051    Lab Status: Final result Specimen: Urine, Clean Catch Updated: 12/25/24 2116     RBC, UA 2-4 /hpf      WBC, UA None Seen /hpf      Epithelial Cells None Seen /hpf      Bacteria, UA Innumerable /hpf      MUCUS THREADS Occasional     Amorphous Crystals, UA Innumerable    UA w Reflex to Microscopic w Reflex to Culture [[id number]]  (Abnormal) Collected: 12/25/24 2051    Lab Status: Final result Specimen: Urine, Clean Catch Updated: 12/25/24 2116     Color, UA Yellow     Clarity, UA Cloudy     Specific Gravity, UA 1.025     pH, UA 8.0      Leukocytes, UA Negative     Nitrite, UA Negative     Protein, UA Trace mg/dl      Glucose, UA Negative mg/dl      Ketones, UA Negative mg/dl      Urobilinogen, UA <2.0 mg/dl      Bilirubin, UA Negative     Occult Blood, UA Negative    POCT pregnancy, urine [[id number]]  (Normal) Collected: 12/25/24 1957    Lab Status: Final result Updated: 12/25/24 1959     EXT Preg Test, Ur Negative     Control Valid    Comprehensive metabolic panel [[id number]]  (Abnormal) Collected: 12/25/24 1932    Lab Status: Final result Specimen: Blood from Arm, Left Updated: 12/25/24 1952     Sodium 140 mmol/L      Potassium 4.3 mmol/L      Chloride 103 mmol/L      CO2 33 mmol/L      ANION GAP 4 mmol/L      BUN 13 mg/dL      Creatinine 0.88 mg/dL      Glucose 97 mg/dL      Calcium 9.1 mg/dL      AST 51 U/L      ALT 52 U/L      Alkaline Phosphatase 49 U/L      Total Protein 7.0 g/dL      Albumin 4.4 g/dL      Total Bilirubin 0.47 mg/dL      eGFR 87 ml/min/1.73sq m     Narrative:      National Kidney Disease Foundation guidelines for Chronic Kidney Disease (CKD):     Stage 1 with normal or high GFR (GFR > 90 mL/min/1.73 square meters)    Stage 2 Mild CKD (GFR = 60-89 mL/min/1.73 square meters)    Stage 3A Moderate CKD (GFR = 45-59 mL/min/1.73 square meters)    Stage 3B Moderate CKD (GFR = 30-44 mL/min/1.73 square meters)    Stage 4 Severe CKD (GFR = 15-29 mL/min/1.73 square meters)    Stage 5 End Stage CKD (GFR <15 mL/min/1.73 square meters)  Note: GFR calculation is accurate only with a steady state creatinine    Lipase [[id number]]  (Normal) Collected: 12/25/24 1932    Lab Status: Final result Specimen: Blood from Arm, Left Updated: 12/25/24 1952     Lipase 32 u/L     CBC and differential [[id number]]  (Abnormal) Collected: 12/25/24 1932    Lab Status: Final result Specimen: Blood from Arm, Left Updated: 12/25/24 1937     WBC 8.03 Thousand/uL      RBC 4.55 Million/uL      Hemoglobin 14.0 g/dL      Hematocrit 42.7 %      MCV 94 fL      MCH 30.8  pg      MCHC 32.8 g/dL      RDW 12.8 %      MPV 8.5 fL      Platelets 380 Thousands/uL      nRBC 0 /100 WBCs      Segmented % 54 %      Immature Grans % 0 %      Lymphocytes % 35 %      Monocytes % 7 %      Eosinophils Relative 3 %      Basophils Relative 1 %      Absolute Neutrophils 4.30 Thousands/µL      Absolute Immature Grans 0.02 Thousand/uL      Absolute Lymphocytes 2.79 Thousands/µL      Absolute Monocytes 0.59 Thousand/µL      Eosinophils Absolute 0.27 Thousand/µL      Basophils Absolute 0.06 Thousands/µL             CT abdomen pelvis with contrast   Final Interpretation by Eddie Thakkar DO ([id number])      Partially distended bladder. Mild circumferential bladder wall thickening noted, possibly exaggerated by under distention. Superimposed cystitis considered in the appropriate clinical setting. Correlation with patient's symptoms, laboratory values, and    urinalysis recommended.      IUD in place, appears appropriately positioned.      Renal cysts, hypodense lesion in the anterior liver, as described, consider flash filling hemangioma, and other findings as above.         Workstation performed: [id number]             Procedures    ED Medication and Procedure Management   Prior to Admission Medications   Prescriptions Last Dose Informant Patient Reported? Taking?   DULoxetine (CYMBALTA) 60 mg delayed release capsule   No No   Sig: Take 1 capsule (60 mg total) by mouth every morning   Levonorgestrel (MIRENA) 20 MCG/DAY IUD  Self Yes No   Si each by Intrauterine route once   buPROPion (WELLBUTRIN XL) 300 mg 24 hr tablet   No No   Sig: Take 1 tablet (300 mg total) by mouth every morning   levothyroxine 100 mcg tablet   No No   Sig: Take 1 tablet by mouth once daily   spironolactone (ALDACTONE) 50 mg tablet  Self No No   Sig: Take 1 tablet (50 mg total) by mouth 2 (two) times a day   valACYclovir (VALTREX) 500 mg tablet  Self Yes No   Sig: if needed Takes when needed       Facility-Administered Medications: None     Discharge Medication List as of 12/25/2024 11:38 PM        START taking these medications    Details   cephalexin (KEFLEX) 500 mg capsule Take 1 capsule (500 mg total) by mouth every 12 (twelve) hours for 7 days, Starting Wed 12/25/2024, Until Wed 1/1/2025, Normal           CONTINUE these medications which have NOT CHANGED    Details   buPROPion (WELLBUTRIN XL) 300 mg 24 hr tablet Take 1 tablet (300 mg total) by mouth every morning, Starting Tue 10/15/2024, Until Sun 4/13/2025, Normal      DULoxetine (CYMBALTA) 60 mg delayed release capsule Take 1 capsule (60 mg total) by mouth every morning, Starting Tue 10/15/2024, Normal      Levonorgestrel (MIRENA) 20 MCG/DAY IUD 1 each by Intrauterine route once, Starting Mon 10/23/2023, Historical Med      levothyroxine 100 mcg tablet Take 1 tablet by mouth once daily, Starting Thu 12/12/2024, Normal      spironolactone (ALDACTONE) 50 mg tablet Take 1 tablet (50 mg total) by mouth 2 (two) times a day, Starting Thu 12/14/2023, Normal      valACYclovir (VALTREX) 500 mg tablet if needed Takes when needed, Starting Wed 1/4/2023, Historical Med           No discharge procedures on file.  ED SEPSIS DOCUMENTATION   Time reflects when diagnosis was documented in both MDM as applicable and the Disposition within this note       Time User Action Codes Description Comment    12/25/2024 11:28 PM Noemi Fernandez [R10.9] Abdominal pain     12/25/2024 11:33 PM Noemi Fernandez [K76.89] Liver cyst     12/25/2024 11:33 PM Noemi Fernandez [N28.1] Renal cyst     12/25/2024 11:34 PM Noemi Fernandez [N39.0] UTI (urinary tract infection)     12/25/2024 11:36 PM Noemi Fernandez [N30.90] Cystitis                  CHUCHO Capone  12/26/24 2791

## 2025-01-02 ENCOUNTER — OFFICE VISIT (OUTPATIENT)
Dept: ENDOCRINOLOGY | Facility: HOSPITAL | Age: 30
End: 2025-01-02
Payer: COMMERCIAL

## 2025-01-02 VITALS
BODY MASS INDEX: 30.62 KG/M2 | WEIGHT: 172.8 LBS | HEART RATE: 110 BPM | HEIGHT: 63 IN | SYSTOLIC BLOOD PRESSURE: 122 MMHG | DIASTOLIC BLOOD PRESSURE: 86 MMHG

## 2025-01-02 DIAGNOSIS — E03.8 SUBCLINICAL HYPOTHYROIDISM: Primary | ICD-10-CM

## 2025-01-02 PROCEDURE — 99214 OFFICE O/P EST MOD 30 MIN: CPT | Performed by: PHYSICIAN ASSISTANT

## 2025-01-02 NOTE — PROGRESS NOTES
Lupis Kidd 29 y.o. female MRN: [id number]    Encounter: [id number]      Assessment & Plan     Assessment:  This is a 29 y.o.-year-old female with hypothyroidism with multinodular thyroid and polycystic ovarian syndrome.    Plan:  1.  Hypothyroidism: Last set of thyroid lab work was completed November 2024 and revealed an elevated TSH and a normal free T4.  Her levothyroxine 100 mcg was increased to 1 tablet 6 days a week and 2 tablets on Sunday.  At this time we will have her repeat lab work in about 2 weeks and see if we need to continue to make adjustments.  She will contact the office with any concerns or questions.  Follow-up in 6 months with labwork completed prior to visit.    2.  Multinodular thyroid: Thyroid ultrasound came back showing stable right-sided nodule.  As far as the left-sided nodule, there is concerns that this may not be a true nodule and might be heterogeneous tissue.  That being said she did have this area biopsied and was benign.  Since nodule has been stable for years with several negative biopsies, 2 we will extend out ultrasounds to every 2 years.    3.  PCOS: Stable.  Will continue with Aldactone 50 mg twice a day.    CC: Thyroid lab work    History of Present Illness     HPI:  Lupis Kidd is a 29 y.o. female with subclinical hypothyroidism on levothyroxine, multinodular thyroid s/p FNA of 0.9cm right thyroid nodule in 08/2019 with neg results, repeat US 04/23 showed 2.6cm TR4 nodule b/l s/p FNA 05/23 right nodule- neg FNA, left nodule was bethesda 1- non diagnostic. PCOS since age 21 who presents today for post partum hypothyroidism/PCOS management.      Subclinical hypothyroidism:- Dx with thyroid issue in 2017, Started on 25mcg levothyroxine. Dose inc during pregnancy to 112mcg. Since delivery had to reduce dose since last labs showed overreplacement. Dose reduced to 50mcg daily with repeat labs recommended which showed elevated TSH and dose has been increased over  time.  Recent thyroid lab work completed November 29, 2024 revealed a TSH of 14.8 with a free T4 of 1.49.  At that time her levothyroxine 100 mcg was increased to 1 tab 6 days a week and 2 tabs on Sunday.  Currently reports feeling well.  At this time denies any fatigue, heat or cold intolerance, palpitations, abdominal pain, diarrhea or constipation, tremors, anxiety or depression.  Denies any neck discomfort.     Dx of Non toxic Multinodular goiter:- Diagnosed based on exam-US done in 2018/2019, had FNA done twice as well but reports in file only for 1 right sided nodule 0.9cm in side- neg for malignancy in 2019. Repeat US 2023 showed growth in b/l nodules 2.6cm TR4 and therefore underwent FNA for both on 05/23. Right nodule benign but left sample was non diagnostic.  Hx of radiation to neck, family hx of thyroid cancer. Repeat US ordered December 2024 showed stable 2.3cm Right TR4 nodule and in the left mid gland nodule was no longer identified and thought to be heterogeneous tissue.   Denies any dysphagia, no odynophagia.     Dx PCOS- age 21. Previously on OCP for menstral regulation, taken off this since was dx with factor 5 leiden def, started on IUD instead,  first pregnancy- naturally conceived. Previously on metformin but dx during pregnancy d/t normal BG. Never been on aldactone in past. Has some hair growth on face but manageable.  PCOS mimickers checked now and are normal   Menses:- on IUD now  Hyperandrogenism-is on Aldactone 50 mg twice a day.. Reports symptoms have slightly improved but not too much, still has to pluck every week.   Metabolic- 11/23 normal A1c    Review of Systems   Constitutional:  Negative for activity change, appetite change, diaphoresis, fatigue and unexpected weight change.   HENT:  Negative for sore throat, trouble swallowing and voice change.    Eyes:  Negative for visual disturbance.   Respiratory:  Negative for chest tightness and shortness of breath.    Cardiovascular:   Negative for chest pain, palpitations and leg swelling.   Gastrointestinal:  Negative for abdominal pain, constipation and diarrhea.   Endocrine: Negative for cold intolerance, heat intolerance, polydipsia, polyphagia and polyuria.   Skin:  Negative for rash.   Neurological:  Negative for dizziness, tremors, light-headedness, numbness and headaches.   Hematological:  Negative for adenopathy.   Psychiatric/Behavioral:  Negative for dysphoric mood and sleep disturbance. The patient is not nervous/anxious.        Historical Information   Past Medical History:   Diagnosis Date   • Anxiety and depression     previously on Cymbalta, stopped before 2022 due to cost of visits. Currently no medication and self-manage.   • Depression    • Factor 5 Leiden mutation, heterozygous (HCC)    • Genital herpes     Last out break per pt months ago (23). On Valtrex only w/outbreak.   • History of loop electrical excision procedure (LEEP) 2019   • Human papilloma virus    • Hypothyroid     Currently on Levothyroxine 25mg and in the process of finding a new endocrinologist. Does not remember when last TSH check.   • Miscarriage 22    This was my first pregnancy. The miscarriage started Friday, 22   • PCOS (polycystic ovarian syndrome)     Currently on metformin 500 mg x2 daily.   •  delivery 2023    Precipitously delivered at 35w0d in hospital [facility] upon arrival to hospital     Past Surgical History:   Procedure Laterality Date   • CERVICAL BIOPSY  W/ LOOP ELECTRODE EXCISION     • US GUIDED THYROID BIOPSY  2023   • US GUIDED THYROID BIOPSY  2019   • WISDOM TOOTH EXTRACTION       Social History   Social History     Substance and Sexual Activity   Alcohol Use Not Currently    Comment: I rarely drink     Social History     Substance and Sexual Activity   Drug Use Never     Social History     Tobacco Use   Smoking Status Former   • Current packs/day: 0.00   • Average packs/day: 0.3 packs/day for  "2.0 years (0.5 ttl pk-yrs)   • Types: Cigarettes   • Quit date: 2023   • Years since quittin.9   • Passive exposure: Never   Smokeless Tobacco Never   Tobacco Comments    As soon as I found out that I was pregnant, I completely stopped smoking.     Family History:   Family History   Problem Relation Age of Onset   • Thyroid disease Mother    • Hypothyroidism Mother    • Depression Father    • Cancer Father         Multiple Myloma   • Anxiety disorder Father    • Deep vein thrombosis Father        Meds/Allergies   Current Outpatient Medications   Medication Sig Dispense Refill   • buPROPion (WELLBUTRIN XL) 300 mg 24 hr tablet Take 1 tablet (300 mg total) by mouth every morning 30 tablet 5   • DULoxetine (CYMBALTA) 60 mg delayed release capsule Take 1 capsule (60 mg total) by mouth every morning 30 capsule 5   • Levonorgestrel (MIRENA) 20 MCG/DAY IUD 1 each by Intrauterine route once     • levothyroxine 100 mcg tablet Take 1 tablet by mouth once daily 90 tablet 1   • valACYclovir (VALTREX) 500 mg tablet if needed Takes when needed     • spironolactone (ALDACTONE) 50 mg tablet Take 1 tablet (50 mg total) by mouth 2 (two) times a day 200 tablet 1     No current facility-administered medications for this visit.     No Known Allergies    Objective   Vitals: Blood pressure 122/86, pulse (!) 110, height 5' 3\" (1.6 m), weight 78.4 kg (172 lb 12.8 oz), not currently breastfeeding.    Physical Exam  Vitals and nursing note reviewed.   Constitutional:       General: She is not in acute distress.     Appearance: Normal appearance. She is not diaphoretic.   HENT:      Head: Normocephalic and atraumatic.   Eyes:      General: No scleral icterus.     Extraocular Movements: Extraocular movements intact.      Conjunctiva/sclera: Conjunctivae normal.      Pupils: Pupils are equal, round, and reactive to light.   Neck:      Thyroid: Thyroid mass and thyromegaly present. No thyroid tenderness.   Cardiovascular:      Rate and " "Rhythm: Normal rate and regular rhythm.      Heart sounds: No murmur heard.  Pulmonary:      Effort: Pulmonary effort is normal. No respiratory distress.      Breath sounds: Normal breath sounds. No wheezing.   Musculoskeletal:      Cervical back: Normal range of motion.      Right lower leg: No edema.      Left lower leg: No edema.   Lymphadenopathy:      Cervical: No cervical adenopathy.   Neurological:      Mental Status: She is alert and oriented to person, place, and time. Mental status is at baseline.      Sensory: No sensory deficit.      Motor: No tremor.      Gait: Gait normal.   Psychiatric:         Mood and Affect: Mood normal.         Behavior: Behavior normal.         Thought Content: Thought content normal.         The history was obtained from the review of the chart, patient.    Lab Results:   Lab Results   Component Value Date/Time    Free t4 1.49 11/29/2024 10:10 AM    Free t4 0.86 06/10/2024 12:10 PM    Free t4 0.64 (L) 03/29/2024 09:53 AM       Imaging Studies:   Results for orders placed during the hospital encounter of 12/21/24    US thyroid    Impression  Stable size of right upper pole previously biopsied nodule. Please see above comments.    Previously noted nodule on the left may not be a true nodule but rather an area of focal heterogeneity as there are no well-defined borders, and it is not confidently visualized on transverse images. The heterogeneous hypoechoic area appears overall  unchanged in size.        Reference: ACR Thyroid Imaging, Reporting and Data System (TI-RADS): White Paper of the ACR TI-RADS Committee. J AM Kurtis Radiol 2017;14:587-595. Additional recommendations based on American Thyroid Association 2015 guidelines.      Workstation performed: [id number]      Results Review Statement: I reviewed radiology reports from this admission including: Ultrasound(s).    Portions of the record may have been created with voice recognition software. Occasional wrong word or \"sound a " "like\" substitutions may have occurred due to the inherent limitations of voice recognition software. Read the chart carefully and recognize, using context, where substitutions have occurred.    "

## 2025-01-02 NOTE — PATIENT INSTRUCTIONS
Get lab work in about 2 weeks.    Call with any concerns or questions.    Continue same dose of levothyroxine at this time.    Follow up in 6 months with labs.

## 2025-01-13 ENCOUNTER — OFFICE VISIT (OUTPATIENT)
Dept: FAMILY MEDICINE CLINIC | Facility: CLINIC | Age: 30
End: 2025-01-13
Payer: COMMERCIAL

## 2025-01-13 VITALS
OXYGEN SATURATION: 98 % | SYSTOLIC BLOOD PRESSURE: 120 MMHG | RESPIRATION RATE: 18 BRPM | BODY MASS INDEX: 30.94 KG/M2 | HEIGHT: 63 IN | WEIGHT: 174.6 LBS | DIASTOLIC BLOOD PRESSURE: 82 MMHG | HEART RATE: 110 BPM | TEMPERATURE: 98.8 F

## 2025-01-13 DIAGNOSIS — Z00.00 ANNUAL PHYSICAL EXAM: Primary | ICD-10-CM

## 2025-01-13 DIAGNOSIS — Z13.6 ENCOUNTER FOR SCREENING FOR CARDIOVASCULAR DISORDERS: ICD-10-CM

## 2025-01-13 DIAGNOSIS — Z13.1 ENCOUNTER FOR SCREENING FOR DIABETES MELLITUS: ICD-10-CM

## 2025-01-13 PROCEDURE — 99395 PREV VISIT EST AGE 18-39: CPT | Performed by: FAMILY MEDICINE

## 2025-01-13 NOTE — PROGRESS NOTES
Adult Annual Physical  Name: Lupis Kidd      : 1995      MRN: [id number]  Encounter Provider: Yee Deal DO  Encounter Date: 2025   Encounter department: [facility]    Assessment & Plan  Annual physical exam  The patient had a normal exam today in the office.  She is doing much better since she got her new job and is also doing very well with the Cymbalta and the Wellbutrin.  She is tolerating the medications well.  Is controlling her anxiety and depression.  She will go for the up-to-date lab work as indicated.  She will continue to follow-up with endocrinology as scheduled.  We will see her back in the office as scheduled in 6 months.  Orders:  •  Comprehensive metabolic panel; Future  •  LDL cholesterol, direct; Future  •  Lipid panel; Future    Encounter for screening for cardiovascular disorders    Orders:  •  Comprehensive metabolic panel; Future  •  LDL cholesterol, direct; Future  •  Lipid panel; Future    Encounter for screening for diabetes mellitus    Orders:  •  Comprehensive metabolic panel; Future  •  LDL cholesterol, direct; Future  •  Lipid panel; Future    Immunizations and preventive care screenings were discussed with patient today. Appropriate education was printed on patient's after visit summary.  The patient is declining the flu shot as well as the COVID-19 vaccination.  Counseling:  Dental Health: discussed importance of regular tooth brushing, flossing, and dental visits.  Injury prevention: discussed safety/seat belts, safety helmets, smoke detectors, carbon monoxide detectors, and smoking near bedding or upholstery.  Exercise: the importance of regular exercise/physical activity was discussed. Recommend exercise 3-5 times per week for at least 30 minutes.        Chief Complaint   Patient presents with   • Annual Exam       History of Present Illness     Adult Annual Physical:  Patient presents for annual physical. Lupis Kidd is  a 29 y.o. female who presents today for routine physical.  She continues to see endocrinology for management of her subclinical hypothyroidism and multinodular goiter.  She is currently taking Cymbalta and Wellbutrin for her depression.  She got a new job and is doing much better.    The patient denies any chest pain, shortness of breath, or palpitations.  There is no APPIAH, PND, or orthopnea.  There is no edema.  There are no headaches or visual changes.  There is no lightheadedness, dizziness, or fainting spells.  The patient currently denies any nausea, vomiting, or GERD symptoms.  she has normal bowel movements and normal urine output.  she has a normal appetite.  [facility] 12/14/2024.  She sees the GYN.    .     Diet and Physical Activity:  - Diet/Nutrition: low fat diet, consuming 3-5 servings of fruits/vegetables daily and well balanced diet.  - Exercise: walking. She does alot of stairs.    Depression Screening:    - PHQ-9 Score: 3    General Health:  - Sleep: sleeps well.  - Hearing: normal hearing bilateral ears.  - Vision: no vision problems, goes for regular eye exams, most recent eye exam < 1 year ago and wears glasses and contacts.  - Dental: no dental visits for > 1 year and brushes teeth twice daily. She just got new dental insurance..    /GYN Health:  - Follows with GYN: yes.   - Menopause: premenopausal.   - History of STDs: no    Advanced Care Planning:  - Has an advanced directive?: no    - Has a durable medical POA?: no    - ACP document given to patient?: no      Review of Systems   Constitutional: Negative.    HENT: Negative.     Eyes: Negative.    Respiratory: Negative.     Cardiovascular: Negative.    Gastrointestinal: Negative.    Endocrine: Negative.    Genitourinary: Negative.    Musculoskeletal: Negative.    Skin: Negative.    Allergic/Immunologic: Negative.    Neurological: Negative.    Hematological: Negative.    Psychiatric/Behavioral: Negative.       Medical History Reviewed by provider  "this encounter:  Tobacco  Allergies  Meds  Problems  Med Hx  Surg Hx  Fam Hx  Soc   Hx    .  Current Outpatient Medications on File Prior to Visit   Medication Sig Dispense Refill   • buPROPion (WELLBUTRIN XL) 300 mg 24 hr tablet Take 1 tablet (300 mg total) by mouth every morning 30 tablet 5   • DULoxetine (CYMBALTA) 60 mg delayed release capsule Take 1 capsule (60 mg total) by mouth every morning 30 capsule 5   • Levonorgestrel (MIRENA) 20 MCG/DAY IUD 1 each by Intrauterine route once     • levothyroxine 100 mcg tablet Take 1 tablet by mouth once daily 90 tablet 1   • valACYclovir (VALTREX) 500 mg tablet if needed Takes when needed       No current facility-administered medications on file prior to visit.      Social History     Tobacco Use   • Smoking status: Former     Current packs/day: 0.00     Average packs/day: 0.3 packs/day for 2.0 years (0.5 ttl pk-yrs)     Types: Cigarettes     Quit date: 2023     Years since quittin.9     Passive exposure: Never   • Smokeless tobacco: Never   • Tobacco comments:     As soon as I found out that I was pregnant, I completely stopped smoking.   Vaping Use   • Vaping status: Never Used   Substance and Sexual Activity   • Alcohol use: Not Currently     Comment: I rarely drink   • Drug use: Never   • Sexual activity: Yes     Partners: Male     Birth control/protection: None     Comment: no new partner in past year       Objective   /82 (BP Location: Left arm, Patient Position: Sitting, Cuff Size: Large)   Pulse (!) 110   Temp 98.8 °F (37.1 °C) (Tympanic)   Resp 18   Ht 5' 3\" (1.6 m)   Wt 79.2 kg (174 lb 9.6 oz)   SpO2 98%   BMI 30.93 kg/m²     Physical Exam  Constitutional:       General: She is not in acute distress.     Appearance: Normal appearance. She is well-developed. She is not diaphoretic.   HENT:      Head: Normocephalic and atraumatic.      Right Ear: Tympanic membrane, ear canal and external ear normal.      Left Ear: Tympanic membrane, " ear canal and external ear normal.      Nose: Nose normal.      Mouth/Throat:      Mouth: Mucous membranes are moist.      Pharynx: Oropharynx is clear. No oropharyngeal exudate.   Eyes:      General: No scleral icterus.        Right eye: No discharge.         Left eye: No discharge.      Extraocular Movements: Extraocular movements intact.      Pupils: Pupils are equal, round, and reactive to light.   Neck:      Thyroid: No thyromegaly.      Vascular: No JVD.      Trachea: No tracheal deviation.   Cardiovascular:      Rate and Rhythm: Normal rate and regular rhythm.      Heart sounds: Normal heart sounds. No murmur heard.     No friction rub. No gallop.   Pulmonary:      Effort: Pulmonary effort is normal. No respiratory distress.      Breath sounds: Normal breath sounds. No wheezing or rales.   Chest:      Chest wall: No tenderness.   Abdominal:      General: Bowel sounds are normal. There is no distension.      Palpations: Abdomen is soft. There is no mass.      Tenderness: There is no abdominal tenderness. There is no guarding or rebound.      Hernia: No hernia is present.   Musculoskeletal:         General: No tenderness or deformity. Normal range of motion.      Cervical back: Normal range of motion and neck supple.   Lymphadenopathy:      Cervical: No cervical adenopathy.   Skin:     General: Skin is warm and dry.      Coloration: Skin is not pale.      Findings: No erythema or rash.   Neurological:      Mental Status: She is alert and oriented to person, place, and time.      Cranial Nerves: No cranial nerve deficit.      Sensory: No sensory deficit.      Motor: No abnormal muscle tone.      Coordination: Coordination normal.      Deep Tendon Reflexes: Reflexes normal.   Psychiatric:         Mood and Affect: Mood normal.         Behavior: Behavior normal.         Thought Content: Thought content normal.         Judgment: Judgment normal.       Administrative Statements   I have spent a total time of 20  minutes in caring for this patient on the day of the visit/encounter including Diagnostic results, Prognosis, Risks and benefits of tx options, Instructions for management, Patient and family education, Importance of tx compliance, Risk factor reductions, Impressions, Counseling / Coordination of care, Documenting in the medical record, Reviewing / ordering tests, medicine, procedures  , and Obtaining or reviewing history  .

## 2025-01-19 DIAGNOSIS — Z00.6 ENCOUNTER FOR EXAMINATION FOR NORMAL COMPARISON OR CONTROL IN CLINICAL RESEARCH PROGRAM: ICD-10-CM

## 2025-02-02 ENCOUNTER — HOSPITAL ENCOUNTER (EMERGENCY)
Facility: HOSPITAL | Age: 30
Discharge: HOME | End: 2025-02-02
Attending: EMERGENCY MEDICINE
Payer: COMMERCIAL

## 2025-02-02 VITALS
TEMPERATURE: 97.6 F | RESPIRATION RATE: 16 BRPM | OXYGEN SATURATION: 100 % | HEIGHT: 63 IN | DIASTOLIC BLOOD PRESSURE: 73 MMHG | SYSTOLIC BLOOD PRESSURE: 114 MMHG | HEART RATE: 80 BPM | BODY MASS INDEX: 31.01 KG/M2 | WEIGHT: 175 LBS

## 2025-02-02 DIAGNOSIS — R10.13 COLICKY EPIGASTRIC PAIN: Primary | ICD-10-CM

## 2025-02-02 LAB
ALBUMIN SERPL BCG-MCNC: 4.3 G/DL (ref 3.5–5)
ALP SERPL-CCNC: 69 U/L (ref 34–104)
ALT SERPL W P-5'-P-CCNC: 76 U/L (ref 7–52)
ANION GAP SERPL CALCULATED.3IONS-SCNC: 5 MMOL/L (ref 4–13)
AST SERPL W P-5'-P-CCNC: 51 U/L (ref 13–39)
ATRIAL RATE: 76 BPM
BASOPHILS # BLD AUTO: 0.05 THOUSANDS/ΜL (ref 0–0.1)
BASOPHILS NFR BLD AUTO: 1 % (ref 0–1)
BILIRUB SERPL-MCNC: 0.39 MG/DL (ref 0.2–1)
BUN SERPL-MCNC: 11 MG/DL (ref 5–25)
CALCIUM SERPL-MCNC: 9.2 MG/DL (ref 8.4–10.2)
CHLORIDE SERPL-SCNC: 106 MMOL/L (ref 96–108)
CO2 SERPL-SCNC: 30 MMOL/L (ref 21–32)
CREAT SERPL-MCNC: 0.75 MG/DL (ref 0.6–1.3)
EOSINOPHIL # BLD AUTO: 0.23 THOUSAND/ΜL (ref 0–0.61)
EOSINOPHIL NFR BLD AUTO: 3 % (ref 0–6)
ERYTHROCYTE [DISTWIDTH] IN BLOOD BY AUTOMATED COUNT: 12.6 % (ref 11.6–15.1)
EXT PREGNANCY TEST URINE: NEGATIVE
EXT. CONTROL: NORMAL
GFR SERPL CREATININE-BSD FRML MDRD: 106 ML/MIN/1.73SQ M
GLUCOSE SERPL-MCNC: 74 MG/DL (ref 65–140)
HCT VFR BLD AUTO: 42.8 % (ref 34.8–46.1)
HGB BLD-MCNC: 13.8 G/DL (ref 11.5–15.4)
IMM GRANULOCYTES # BLD AUTO: 0.05 THOUSAND/UL (ref 0–0.2)
IMM GRANULOCYTES NFR BLD AUTO: 1 % (ref 0–2)
LIPASE SERPL-CCNC: 22 U/L (ref 11–82)
LYMPHOCYTES # BLD AUTO: 2.56 THOUSANDS/ΜL (ref 0.6–4.47)
LYMPHOCYTES NFR BLD AUTO: 27 % (ref 14–44)
MCH RBC QN AUTO: 30.7 PG (ref 26.8–34.3)
MCHC RBC AUTO-ENTMCNC: 32.2 G/DL (ref 31.4–37.4)
MCV RBC AUTO: 95 FL (ref 82–98)
MONOCYTES # BLD AUTO: 0.56 THOUSAND/ΜL (ref 0.17–1.22)
MONOCYTES NFR BLD AUTO: 6 % (ref 4–12)
NEUTROPHILS # BLD AUTO: 5.89 THOUSANDS/ΜL (ref 1.85–7.62)
NEUTS SEG NFR BLD AUTO: 62 % (ref 43–75)
NRBC BLD AUTO-RTO: 0 /100 WBCS
P AXIS: 61 DEGREES
PLATELET # BLD AUTO: 372 THOUSANDS/UL (ref 149–390)
PMV BLD AUTO: 8.4 FL (ref 8.9–12.7)
POTASSIUM SERPL-SCNC: 3.5 MMOL/L (ref 3.5–5.3)
PR INTERVAL: 146 MS
PROT SERPL-MCNC: 6.7 G/DL (ref 6.4–8.4)
QRS AXIS: 75 DEGREES
QRSD INTERVAL: 88 MS
QT INTERVAL: 392 MS
QTC INTERVAL: 441 MS
RBC # BLD AUTO: 4.49 MILLION/UL (ref 3.81–5.12)
SODIUM SERPL-SCNC: 141 MMOL/L (ref 135–147)
T WAVE AXIS: 57 DEGREES
VENTRICULAR RATE: 76 BPM
WBC # BLD AUTO: 9.34 THOUSAND/UL (ref 4.31–10.16)

## 2025-02-02 PROCEDURE — 80053 COMPREHEN METABOLIC PANEL: CPT | Performed by: EMERGENCY MEDICINE

## 2025-02-02 PROCEDURE — 85025 COMPLETE CBC W/AUTO DIFF WBC: CPT | Performed by: EMERGENCY MEDICINE

## 2025-02-02 PROCEDURE — 81025 URINE PREGNANCY TEST: CPT | Performed by: EMERGENCY MEDICINE

## 2025-02-02 PROCEDURE — 93010 ELECTROCARDIOGRAM REPORT: CPT | Performed by: INTERNAL MEDICINE

## 2025-02-02 PROCEDURE — 83690 ASSAY OF LIPASE: CPT | Performed by: EMERGENCY MEDICINE

## 2025-02-02 PROCEDURE — 93005 ELECTROCARDIOGRAM TRACING: CPT

## 2025-02-02 PROCEDURE — 36415 COLL VENOUS BLD VENIPUNCTURE: CPT | Performed by: EMERGENCY MEDICINE

## 2025-02-02 PROCEDURE — 99285 EMERGENCY DEPT VISIT HI MDM: CPT | Performed by: EMERGENCY MEDICINE

## 2025-02-02 PROCEDURE — 99283 EMERGENCY DEPT VISIT LOW MDM: CPT

## 2025-02-02 NOTE — DISCHARGE INSTRUCTIONS
You have been seen for abdominal pain. Please take pepcid for your symptoms. Return to the emergency department if you develop worsening pain,  or any other symptoms of concern. Please follow up with your PCP by calling the number provided.

## 2025-02-02 NOTE — ED PROVIDER NOTES
Time reflects when diagnosis was documented in both MDM as applicable and the Disposition within this note       Time User Action Codes Description Comment    2/2/2025  4:47 AM Shahid Amado Add [R10.13] Colicky epigastric pain           ED Disposition       ED Disposition   Discharge    Condition   Stable    Date/Time   Sun Feb 2, 2025  4:47 AM    Comment   Lupis Kidd discharge to home/self care.                   Assessment & Plan       Medical Decision Making    29 y.o. female presenting for colicky epigastric pain.  VSS.  Patient reporting symptoms have resolved at time of evaluation in ED.  Will obtain labs to evaluate for leukocytosis, anemia, biliary disease, pancreatitis, electrolyte abnormality or VIRGILIO.  Will obtain EKG to evaluate for arrhythmia.  Lungs CTAB, do not suspect pneumonia.    Reassessment: VSS, patient remains asymptomatic.  Reviewed labs with patient, including mild nonspecific transaminitis.  Symptoms possibly due to gastritis.  Advised outpatient f/u given symptoms and prior history of liver hemangioma.    Disposition: I have discussed with the patient our plan to discharge them from the ED and the patient is in agreement with this plan.   The patient was provided a written after visit summary with strict RTED precautions.     Followup: I have discussed with the patient plan to follow up with their PCP. Contact information provided in AVS.    Amount and/or Complexity of Data Reviewed  Labs: ordered. Decision-making details documented in ED Course.        ED Course as of 02/02/25 0600   Sun Feb 02, 2025   0401 Procedure Note: EKG  Date/Time: 02/02/25 4:01 AM   Interpreted by: Shahid Amado DO  Indications / Diagnosis: epigastric pain  ECG reviewed by me, the ED Provider: yes   The EKG demonstrates:  Rhythm: normal sinus rhythm 76 BPM  Intervals: Normal NH and QT intervals  Axis: Normal axis  QRS/Blocks: Normal QRS  ST Changes: No acute ST/T waves changes. No WILIAN. No TWI.   0438  ALT(!): 76   0438 AST(!): 51  Similar to prior       Medications - No data to display    ED Risk Strat Scores                          SBIRT 20yo+      Flowsheet Row Most Recent Value   Initial Alcohol Screen: US AUDIT-C     1. How often do you have a drink containing alcohol? 0 Filed at: 2025   2. How many drinks containing alcohol do you have on a typical day you are drinking?  0 Filed at: 2025   3b. FEMALE Any Age, or MALE 65+: How often do you have 4 or more drinks on one occassion? 0 Filed at: 2025   Audit-C Score 0 Filed at: 2025   RENAE: How many times in the past year have you...    Used an illegal drug or used a prescription medication for non-medical reasons? Never Filed at: 2025                            History of Present Illness       Chief Complaint   Patient presents with    Medical Problem     Pt arrives to ED with multiple complaints, initially came to ED for pain under her rib cage but states it has now resolved.        Past Medical History:   Diagnosis Date    Anxiety and depression     previously on Cymbalta, stopped before 2022 due to cost of visits. Currently no medication and self-manage.    Depression     Factor 5 Leiden mutation, heterozygous (HCC)     Genital herpes     Last out break per pt months ago (23). On Valtrex only w/outbreak.    History of loop electrical excision procedure (LEEP) 2019    Human papilloma virus     Hypothyroid     Currently on Levothyroxine 25mg and in the process of finding a new endocrinologist. Does not remember when last TSH check.    Miscarriage 22    This was my first pregnancy. The miscarriage started Friday, 22    PCOS (polycystic ovarian syndrome)     Currently on metformin 500 mg x2 daily.     delivery 2023    Precipitously delivered at 35w0d in hospital [facility] upon arrival to hospital      Past Surgical History:   Procedure Laterality Date    CERVICAL BIOPSY  W/  LOOP ELECTRODE EXCISION      US GUIDED THYROID BIOPSY  2023    US GUIDED THYROID BIOPSY  2019    WISDOM TOOTH EXTRACTION        Family History   Problem Relation Age of Onset    Thyroid disease Mother     Hypothyroidism Mother     Depression Father     Cancer Father         Multiple Myloma    Anxiety disorder Father     Deep vein thrombosis Father       Social History     Tobacco Use    Smoking status: Former     Current packs/day: 0.00     Average packs/day: 0.3 packs/day for 2.0 years (0.5 ttl pk-yrs)     Types: Cigarettes     Quit date: 2023     Years since quittin.9     Passive exposure: Never    Smokeless tobacco: Never    Tobacco comments:     As soon as I found out that I was pregnant, I completely stopped smoking.   Vaping Use    Vaping status: Never Used   Substance Use Topics    Alcohol use: Not Currently     Comment: I rarely drink    Drug use: Never      E-Cigarette/Vaping    E-Cigarette Use Never User     Quit Date 23       E-Cigarette/Vaping Substances    Nicotine No     THC No     CBD No     Flavoring No     Other No     Unknown No       I have reviewed and agree with the history as documented.     Lupis Kidd is a 29 y.o. year old female with PMH of PCOS, Factor V leiden mutation presenting to the [facility] ED for back and abdominal pain. Patient noticed nonspecific back pain earlier this evening. Symptoms progressed to including upper abdominal discomfort below her ribs. No associated N/V. No dysuria or hematuria. She clarifies no associated chest pain or dyspnea. Symptoms seemed to have resolved at time of evaluation in ED. She has had similar symptoms previously for which she was evaluated in ED. The patient has not taken/received any medications at home for relief of symptoms. Patient denies history of prior abdominal surgeries.      History provided by:  Medical records and patient   used: No        Review of Systems   Constitutional:  Negative for  fever.   Respiratory:  Negative for shortness of breath.    Cardiovascular:  Negative for chest pain.   Gastrointestinal:  Positive for abdominal pain. Negative for diarrhea, nausea and vomiting.   Genitourinary:  Negative for dysuria, flank pain and hematuria.   Musculoskeletal:  Positive for back pain.   Neurological:  Negative for weakness and numbness.   All other systems reviewed and are negative.          Objective       ED Triage Vitals [02/02/25 0356]   Temperature Pulse Blood Pressure Respirations SpO2 Patient Position - Orthostatic VS   97.6 °F (36.4 °C) 80 114/73 16 100 % --      Temp Source Heart Rate Source BP Location FiO2 (%) Pain Score    Oral -- -- -- 7      Vitals      Date and Time Temp Pulse SpO2 Resp BP Pain Score FACES Pain Rating User   02/02/25 0415 -- -- -- -- -- 5 -- MG   02/02/25 0356 97.6 °F (36.4 °C) 80 100 % 16 114/73 7 -- CC            Physical Exam  Vitals and nursing note reviewed.   Constitutional:       General: She is not in acute distress.     Appearance: Normal appearance. She is well-developed. She is not ill-appearing, toxic-appearing or diaphoretic.   HENT:      Head: Normocephalic and atraumatic.      Nose: No congestion or rhinorrhea.   Eyes:      General:         Right eye: No discharge.         Left eye: No discharge.   Cardiovascular:      Rate and Rhythm: Normal rate and regular rhythm.   Pulmonary:      Effort: Pulmonary effort is normal. No accessory muscle usage or respiratory distress.      Breath sounds: Normal breath sounds. No stridor. No decreased breath sounds, wheezing, rhonchi or rales.   Abdominal:      General: There is no distension.      Palpations: Abdomen is soft.      Tenderness: There is no abdominal tenderness. There is no right CVA tenderness, left CVA tenderness, guarding or rebound.   Musculoskeletal:      Thoracic back: No bony tenderness.      Lumbar back: No bony tenderness.      Right lower leg: No tenderness.      Left lower leg: No  tenderness.   Skin:     Capillary Refill: Capillary refill takes less than 2 seconds.      Coloration: Skin is not pale.   Neurological:      Mental Status: She is alert and oriented to person, place, and time.      GCS: GCS eye subscore is 4. GCS verbal subscore is 5. GCS motor subscore is 6.   Psychiatric:         Mood and Affect: Mood normal.         Behavior: Behavior normal.         Results Reviewed       Procedure Component Value Units Date/Time    POCT pregnancy, urine [[id number]]  (Normal) Collected: 02/02/25 0520    Lab Status: Final result Updated: 02/02/25 0521     EXT Preg Test, Ur Negative     Control Valid    Comprehensive metabolic panel [[id number]]  (Abnormal) Collected: 02/02/25 0413    Lab Status: Final result Specimen: Blood from Arm, Right Updated: 02/02/25 0437     Sodium 141 mmol/L      Potassium 3.5 mmol/L      Chloride 106 mmol/L      CO2 30 mmol/L      ANION GAP 5 mmol/L      BUN 11 mg/dL      Creatinine 0.75 mg/dL      Glucose 74 mg/dL      Calcium 9.2 mg/dL      AST 51 U/L      ALT 76 U/L      Alkaline Phosphatase 69 U/L      Total Protein 6.7 g/dL      Albumin 4.3 g/dL      Total Bilirubin 0.39 mg/dL      eGFR 106 ml/min/1.73sq m     Narrative:      National Kidney Disease Foundation guidelines for Chronic Kidney Disease (CKD):     Stage 1 with normal or high GFR (GFR > 90 mL/min/1.73 square meters)    Stage 2 Mild CKD (GFR = 60-89 mL/min/1.73 square meters)    Stage 3A Moderate CKD (GFR = 45-59 mL/min/1.73 square meters)    Stage 3B Moderate CKD (GFR = 30-44 mL/min/1.73 square meters)    Stage 4 Severe CKD (GFR = 15-29 mL/min/1.73 square meters)    Stage 5 End Stage CKD (GFR <15 mL/min/1.73 square meters)  Note: GFR calculation is accurate only with a steady state creatinine    Lipase [[id number]]  (Normal) Collected: 02/02/25 0413    Lab Status: Final result Specimen: Blood from Arm, Right Updated: 02/02/25 0437     Lipase 22 u/L     CBC and differential [[id number]]  (Abnormal)  Collected: 253    Lab Status: Final result Specimen: Blood from Arm, Right Updated: 25     WBC 9.34 Thousand/uL      RBC 4.49 Million/uL      Hemoglobin 13.8 g/dL      Hematocrit 42.8 %      MCV 95 fL      MCH 30.7 pg      MCHC 32.2 g/dL      RDW 12.6 %      MPV 8.4 fL      Platelets 372 Thousands/uL      nRBC 0 /100 WBCs      Segmented % 62 %      Immature Grans % 1 %      Lymphocytes % 27 %      Monocytes % 6 %      Eosinophils Relative 3 %      Basophils Relative 1 %      Absolute Neutrophils 5.89 Thousands/µL      Absolute Immature Grans 0.05 Thousand/uL      Absolute Lymphocytes 2.56 Thousands/µL      Absolute Monocytes 0.56 Thousand/µL      Eosinophils Absolute 0.23 Thousand/µL      Basophils Absolute 0.05 Thousands/µL             No orders to display       Procedures    ED Medication and Procedure Management   Prior to Admission Medications   Prescriptions Last Dose Informant Patient Reported? Taking?   DULoxetine (CYMBALTA) 60 mg delayed release capsule  Self No No   Sig: Take 1 capsule (60 mg total) by mouth every morning   Levonorgestrel (MIRENA) 20 MCG/DAY IUD  Self Yes No   Si each by Intrauterine route once   buPROPion (WELLBUTRIN XL) 300 mg 24 hr tablet  Self No No   Sig: Take 1 tablet (300 mg total) by mouth every morning   levothyroxine 100 mcg tablet  Self No No   Sig: Take 1 tablet by mouth once daily   valACYclovir (VALTREX) 500 mg tablet  Self Yes No   Sig: if needed Takes when needed      Facility-Administered Medications: None     Discharge Medication List as of 2025  4:48 AM        CONTINUE these medications which have NOT CHANGED    Details   buPROPion (WELLBUTRIN XL) 300 mg 24 hr tablet Take 1 tablet (300 mg total) by mouth every morning, Starting Tue 10/15/2024, Until Sun 2025, Normal      DULoxetine (CYMBALTA) 60 mg delayed release capsule Take 1 capsule (60 mg total) by mouth every morning, Starting Tue 10/15/2024, Normal      Levonorgestrel (MIRENA) 20  MCG/DAY IUD 1 each by Intrauterine route once, Starting Mon 10/23/2023, Historical Med      levothyroxine 100 mcg tablet Take 1 tablet by mouth once daily, Starting Thu 12/12/2024, Normal      valACYclovir (VALTREX) 500 mg tablet if needed Takes when needed, Starting Wed 1/4/2023, Historical Med           No discharge procedures on file.  ED SEPSIS DOCUMENTATION   Time reflects when diagnosis was documented in both MDM as applicable and the Disposition within this note       Time User Action Codes Description Comment    2/2/2025  4:47 AM Shahid Amado Add [R10.13] Colicky epigastric pain                  Shahid Amado, DO  02/02/25 0600

## 2025-02-18 ENCOUNTER — TELEPHONE (OUTPATIENT)
Dept: ENDOCRINOLOGY | Facility: CLINIC | Age: 30
End: 2025-02-18

## 2025-02-18 DIAGNOSIS — E28.2 POLYCYSTIC OVARIAN SYNDROME: ICD-10-CM

## 2025-02-18 DIAGNOSIS — E03.9 ACQUIRED HYPOTHYROIDISM: ICD-10-CM

## 2025-02-19 RX ORDER — LEVOTHYROXINE SODIUM 100 UG/1
100 TABLET ORAL DAILY
Qty: 90 TABLET | Refills: 1 | Status: SHIPPED | OUTPATIENT
Start: 2025-02-19

## 2025-02-25 ENCOUNTER — ANNUAL EXAM (OUTPATIENT)
Dept: OBGYN CLINIC | Facility: CLINIC | Age: 30
End: 2025-02-25
Payer: COMMERCIAL

## 2025-02-25 VITALS
DIASTOLIC BLOOD PRESSURE: 72 MMHG | HEIGHT: 63 IN | BODY MASS INDEX: 31.36 KG/M2 | WEIGHT: 177 LBS | SYSTOLIC BLOOD PRESSURE: 116 MMHG

## 2025-02-25 DIAGNOSIS — Z87.410 HISTORY OF CERVICAL DYSPLASIA: ICD-10-CM

## 2025-02-25 DIAGNOSIS — Z86.19 HISTORY OF HPV INFECTION: ICD-10-CM

## 2025-02-25 DIAGNOSIS — Z01.419 ROUTINE GYNECOLOGICAL EXAMINATION: Primary | ICD-10-CM

## 2025-02-25 PROCEDURE — S0612 ANNUAL GYNECOLOGICAL EXAMINA: HCPCS | Performed by: PHYSICIAN ASSISTANT

## 2025-02-25 RX ORDER — MELOXICAM 7.5 MG/1
TABLET ORAL
COMMUNITY
Start: 2025-02-23

## 2025-02-25 NOTE — PROGRESS NOTES
[facility]  [address], [address], [place] [address]    ASSESSMENT/PLAN: Lupis Kidd is a 29 y.o.  who presents for annual gynecologic exam.    Encounter for routine gynecologic examination  - Routine well woman exam completed today.  - Cervical Cancer Screening: Current ASCCP Guidelines reviewed. Last Pap: 2024 NILM (-)HRHPV. History of abnormal: LEEP 2019.   - HPV Vaccination status: Immunization series complete  - STI screening offered including HIV testing: offered, pt declined  - Contraceptive counseling discussed.  Current contraception: IUD, Mirena:       Additional problems addressed during this visit:  1. Routine gynecological examination  Assessment & Plan:  Pap guidelines reviewed. Pap with HPV done today secondary to history of LEEP.   Continue Mirena IUD.   Return to office for annual or as needed.   Orders:  -     IGP, Aptima HPV, Rfx 16/18,45  2. History of cervical dysplasia  -     IGP, Aptima HPV, Rfx 16/18,45  3. History of HPV infection  -     IGP, Aptima HPV, Rfx 16/18,45      CC:  Annual Gynecologic Examination    HPI: Lupis Kidd is a 29 y.o.  who presents for annual gynecologic examination. Mirena IUD inserted 10/24/2023, occasional menses not too heavy or painful.     ROS: Negative except as noted in HPI    No LMP recorded. Patient has had an implant.       She  reports being sexually active and has had partner(s) who are male. She reports using the following method of birth control/protection: None.       The following portions of the patient's history were reviewed and updated as appropriate:   Past Medical History:   Diagnosis Date    Anxiety     Anxiety and depression     previously on Cymbalta, stopped before 2022 due to cost of visits. Currently no medication and self-manage.    Depression     Disease of thyroid gland     Factor 5 Leiden mutation, heterozygous (HCC)     Genital herpes     Last out break per pt months  ago (23). On Valtrex only w/outbreak.    History of loop electrical excision procedure (LEEP) 2019    Human papilloma virus     Hypothyroid     Currently on Levothyroxine 25mg and in the process of finding a new endocrinologist. Does not remember when last TSH check.    Miscarriage 22    This was my first pregnancy. The miscarriage started Friday, 22    PCOS (polycystic ovarian syndrome)     Currently on metformin 500 mg x2 daily.     delivery 2023    Precipitously delivered at 35w0d in [facility] upon arrival to hospital     Past Surgical History:   Procedure Laterality Date    CERVICAL BIOPSY  W/ LOOP ELECTRODE EXCISION      US GUIDED THYROID BIOPSY  2023    US GUIDED THYROID BIOPSY  2019    WISDOM TOOTH EXTRACTION       Family History   Problem Relation Age of Onset    Thyroid disease Mother     Hypothyroidism Mother     Depression Father     Cancer Father         Multiple Myloma    Anxiety disorder Father     Deep vein thrombosis Father      Social History     Socioeconomic History    Marital status: Single     Spouse name: None    Number of children: None    Years of education: None    Highest education level: None   Occupational History    None   Tobacco Use    Smoking status: Former     Current packs/day: 0.00     Average packs/day: 0.3 packs/day for 2.0 years (0.5 ttl pk-yrs)     Types: Cigarettes     Quit date: 2023     Years since quittin.0     Passive exposure: Never    Smokeless tobacco: Never    Tobacco comments:     As soon as I found out that I was pregnant, I completely stopped smoking.   Vaping Use    Vaping status: Never Used   Substance and Sexual Activity    Alcohol use: Not Currently     Comment: I rarely drink    Drug use: Never    Sexual activity: Yes     Partners: Male     Birth control/protection: None     Comment: no new partner in past year   Other Topics Concern    None   Social History Narrative    None     Social Drivers of Health  "    Financial Resource Strain: Not on file   Food Insecurity: Not on file   Transportation Needs: Not on file   Physical Activity: Not on file   Stress: Not on file   Social Connections: Not on file   Intimate Partner Violence: Not At Risk (11/17/2023)    Humiliation, Afraid, Rape, and Kick questionnaire     Fear of Current or Ex-Partner: No     Emotionally Abused: No     Physically Abused: No     Sexually Abused: No   Housing Stability: Not on file     Outpatient Medications Marked as Taking for the 2/25/25 encounter (Annual Exam) with Jess Colmenares PA-C   Medication    buPROPion (WELLBUTRIN XL) 300 mg 24 hr tablet    DULoxetine (CYMBALTA) 60 mg delayed release capsule    Levonorgestrel (MIRENA) 20 MCG/DAY IUD    levothyroxine 100 mcg tablet    meloxicam (MOBIC) 7.5 mg tablet    valACYclovir (VALTREX) 500 mg tablet     No Known Allergies        Objective:  /72 (BP Location: Left arm, Patient Position: Sitting, Cuff Size: Standard)   Ht 5' 3\" (1.6 m)   Wt 80.3 kg (177 lb)   BMI 31.35 kg/m²        Chaperone present? Offered, declines.      Physical Exam  Constitutional:       Appearance: Normal appearance. She is well-developed.   Genitourinary:      Vulva and bladder normal.      No lesions in the vagina.      Right Labia: No rash, tenderness, lesions or skin changes.     Left Labia: No tenderness, lesions, skin changes or rash.     No labial fusion noted.      No inguinal adenopathy present in the right or left side.     No vaginal discharge, erythema, tenderness or bleeding.        Right Adnexa: not tender, not full and no mass present.     Left Adnexa: not tender, not full and no mass present.     No cervical motion tenderness, discharge or lesion.      IUD strings visualized.      Uterus is not enlarged, tender or irregular.      No uterine mass detected.     No urethral prolapse, tenderness or mass present.      Bladder is not tender.    Breasts:     Breasts are symmetrical.      Right: No " swelling, bleeding, inverted nipple, mass, nipple discharge, skin change or tenderness.      Left: No swelling, bleeding, inverted nipple, mass, nipple discharge, skin change or tenderness.   HENT:      Head: Normocephalic and atraumatic.   Neck:      Thyroid: No thyromegaly.   Cardiovascular:      Rate and Rhythm: Normal rate and regular rhythm.      Heart sounds: Normal heart sounds. No murmur heard.     No friction rub. No gallop.   Pulmonary:      Effort: Pulmonary effort is normal. No respiratory distress.      Breath sounds: Normal breath sounds. No wheezing.   Abdominal:      General: There is no distension.      Palpations: Abdomen is soft. There is no mass.      Tenderness: There is no abdominal tenderness. There is no guarding or rebound.      Hernia: No hernia is present.   Lymphadenopathy:      Cervical: No cervical adenopathy.      Upper Body:      Right upper body: No supraclavicular, axillary or pectoral adenopathy.      Left upper body: No supraclavicular, axillary or pectoral adenopathy.      Lower Body: No right inguinal adenopathy. No left inguinal adenopathy.   Neurological:      Mental Status: She is alert and oriented to person, place, and time.   Skin:     General: Skin is warm and dry.   Psychiatric:         Behavior: Behavior normal.             Jess Colmenares PA-C  2/25/2025 10:30 PM

## 2025-02-26 NOTE — ASSESSMENT & PLAN NOTE
Pap guidelines reviewed. Pap with HPV done today secondary to history of LEEP.   Continue Mirena IUD.   Return to office for annual or as needed.

## 2025-03-02 LAB
CYTOLOGIST CVX/VAG CYTO: NORMAL
DX ICD CODE: NORMAL
HPV GENOTYPE REFLEX: NORMAL
HPV I/H RISK 4 DNA CVX QL PROBE+SIG AMP: NEGATIVE
OTHER STN SPEC: NORMAL
PATH REPORT.FINAL DX SPEC: NORMAL
SL AMB NOTE:: NORMAL
SL AMB SPECIMEN ADEQUACY: NORMAL
SL AMB TEST METHODOLOGY: NORMAL

## 2025-03-03 ENCOUNTER — RESULTS FOLLOW-UP (OUTPATIENT)
Dept: OBGYN CLINIC | Facility: CLINIC | Age: 30
End: 2025-03-03

## 2025-03-27 PROBLEM — Z01.419 ROUTINE GYNECOLOGICAL EXAMINATION: Status: RESOLVED | Noted: 2025-02-25 | Resolved: 2025-03-27

## 2025-04-21 DIAGNOSIS — A60.09 GENITAL HERPES SIMPLEX VIRUS (HSV) INFECTION IN MOTHER AFFECTING PREGNANCY: Primary | ICD-10-CM

## 2025-04-21 DIAGNOSIS — O98.319 GENITAL HERPES SIMPLEX VIRUS (HSV) INFECTION IN MOTHER AFFECTING PREGNANCY: Primary | ICD-10-CM

## 2025-04-21 RX ORDER — VALACYCLOVIR HYDROCHLORIDE 500 MG/1
500 TABLET, FILM COATED ORAL DAILY
Qty: 90 TABLET | Refills: 2 | Status: SHIPPED | OUTPATIENT
Start: 2025-04-21 | End: 2026-01-16

## 2025-06-10 ENCOUNTER — TELEPHONE (OUTPATIENT)
Age: 30
End: 2025-06-10

## 2025-06-10 DIAGNOSIS — Z13.6 SCREENING FOR HYPERTENSION: ICD-10-CM

## 2025-06-10 DIAGNOSIS — Z13.1 SCREENING FOR DIABETES MELLITUS: ICD-10-CM

## 2025-06-10 DIAGNOSIS — Z00.00 ROUTINE GENERAL MEDICAL EXAMINATION AT A HEALTH CARE FACILITY: Primary | ICD-10-CM

## 2025-06-10 NOTE — TELEPHONE ENCOUNTER
Patient would like her active labs from 01/13/25 to be changed from [facility] to [facility].  Please advise.

## 2025-06-10 NOTE — TELEPHONE ENCOUNTER
Patient called to advise she will need lab orders placed . Her prior orders will  25 patient will not be able to get labs done prior to that date. She has an appointment for 25 with Dr. Zuleta.    Patient using [facility] lab

## 2025-06-19 ENCOUNTER — OFFICE VISIT (OUTPATIENT)
Dept: URGENT CARE | Facility: CLINIC | Age: 30
End: 2025-06-19
Payer: COMMERCIAL

## 2025-06-19 VITALS
DIASTOLIC BLOOD PRESSURE: 89 MMHG | TEMPERATURE: 97.2 F | HEIGHT: 63 IN | HEART RATE: 96 BPM | RESPIRATION RATE: 18 BRPM | BODY MASS INDEX: 34.2 KG/M2 | OXYGEN SATURATION: 98 % | SYSTOLIC BLOOD PRESSURE: 134 MMHG | WEIGHT: 193 LBS

## 2025-06-19 DIAGNOSIS — G43.909 MIGRAINE WITHOUT STATUS MIGRAINOSUS, NOT INTRACTABLE, UNSPECIFIED MIGRAINE TYPE: Primary | ICD-10-CM

## 2025-06-19 PROCEDURE — 96372 THER/PROPH/DIAG INJ SC/IM: CPT

## 2025-06-19 PROCEDURE — 99213 OFFICE O/P EST LOW 20 MIN: CPT

## 2025-06-19 RX ORDER — ONDANSETRON 4 MG/1
4 TABLET, FILM COATED ORAL EVERY 8 HOURS PRN
Qty: 20 TABLET | Refills: 0 | Status: SHIPPED | OUTPATIENT
Start: 2025-06-19

## 2025-06-19 RX ORDER — ONDANSETRON 4 MG/1
4 TABLET, ORALLY DISINTEGRATING ORAL ONCE
Status: COMPLETED | OUTPATIENT
Start: 2025-06-19 | End: 2025-06-19

## 2025-06-19 RX ORDER — KETOROLAC TROMETHAMINE 30 MG/ML
30 INJECTION, SOLUTION INTRAMUSCULAR; INTRAVENOUS ONCE
Status: COMPLETED | OUTPATIENT
Start: 2025-06-19 | End: 2025-06-19

## 2025-06-19 RX ADMIN — KETOROLAC TROMETHAMINE 30 MG: 30 INJECTION, SOLUTION INTRAMUSCULAR; INTRAVENOUS at 15:51

## 2025-06-19 RX ADMIN — ONDANSETRON 4 MG: 4 TABLET, ORALLY DISINTEGRATING ORAL at 15:51

## 2025-06-19 NOTE — PROGRESS NOTES
"  [facility]        NAME: Lupis Kidd is a 30 y.o. female  : 1995    MRN: [id number]  DATE: 2025  TIME: 4:22 PM    Assessment and Plan   Migraine without status migrainosus, not intractable, unspecified migraine type [G43.909]  1. Migraine without status migrainosus, not intractable, unspecified migraine type  ketorolac (TORADOL) injection 30 mg    ondansetron (ZOFRAN-ODT) dispersible tablet 4 mg    ondansetron (ZOFRAN) 4 mg tablet            Patient Instructions     You have been given a Toradol shot today. This is an anti-inflammatory medication, similar to ibuprofen. Do not take any other NSAIDs for the next 8 hours. (EX: ibuprofen, Aleve, Motrin, Advil, meloxicam).     You may take Tylenol or Excedrin.     Take Zofran as needed as prescribed. You were given a dose here in the clinic at 4 pm.    Drink plenty of fluids to stay hydrated.    Follow-up with PCP in 3-5 days.    Go to the ED for any persistent or worsening symptoms.    If tests are performed, our office will contact you with results only if changes need to made to the care plan discussed with you at the visit. You can review your full results on [facility].      Chief Complaint     Chief Complaint   Patient presents with    Migraine     Pt c/o migraine since Tuesday. Pt gets headaches but takes Tylenol/Excedrin and they go away, this time it hasn't. Pt c/o computer monitor screen bothers, positive for nausea (no vomiting) , no changes in vision. Denies trauma to the head. Pt states she is under a lot of stress at this moment.          History of Present Illness       30-year-old female presenting with ongoing headache x 2 days. Under a significant amount of stress lately. No injury. Patient reports associated dizziness, eye strain, and nausea without vomiting. Not \"worst headache ever.\" Does not wake her from sleep. Denies fever, numbness, confusion, weakness, vision loss, floaters/halos, blurred vision, CP, " "SOB, and abdominal pain. Little to no relief with Tylenol or Excedrin.         Review of Systems   Review of Systems   Constitutional:  Negative for fever.   Eyes:         Eye strain   Respiratory:  Negative for shortness of breath.    Cardiovascular:  Negative for chest pain.   Gastrointestinal:  Positive for nausea. Negative for abdominal pain and vomiting.   Neurological:  Positive for dizziness and headaches. Negative for seizures, syncope, speech difficulty, weakness and numbness.   Psychiatric/Behavioral:  Negative for confusion and sleep disturbance.          Current Medications     Current Medications[1]    Current Allergies     Allergies as of 06/19/2025    (No Known Allergies)            The following portions of the patient's history were reviewed and updated as appropriate: allergies, current medications, past family history, past medical history, past social history, past surgical history and problem list.     Past Medical History[2]    Past Surgical History[3]    Family History[4]      Medications have been verified.        Objective   /89   Pulse 96   Temp (!) 97.2 °F (36.2 °C) (Tympanic)   Resp 18   Ht 5' 3\" (1.6 m)   Wt 87.5 kg (193 lb)   SpO2 98%   BMI 34.19 kg/m²        Physical Exam     Physical Exam  Vitals and nursing note reviewed.   Constitutional:       General: She is not in acute distress.     Appearance: She is not ill-appearing, toxic-appearing or diaphoretic.   HENT:      Head: Normocephalic and atraumatic.      Right Ear: Tympanic membrane, ear canal and external ear normal.      Left Ear: Tympanic membrane, ear canal and external ear normal.      Nose: Nose normal.      Mouth/Throat:      Mouth: Mucous membranes are moist.      Pharynx: Oropharynx is clear.     Eyes:      Extraocular Movements: Extraocular movements intact.      Conjunctiva/sclera: Conjunctivae normal.      Pupils: Pupils are equal, round, and reactive to light.       Cardiovascular:      Rate and Rhythm: " Normal rate and regular rhythm.      Pulses: Normal pulses.      Heart sounds: Normal heart sounds.   Pulmonary:      Effort: Pulmonary effort is normal.      Breath sounds: Normal breath sounds.     Musculoskeletal:         General: Normal range of motion.      Cervical back: Normal range of motion and neck supple.     Skin:     General: Skin is warm and dry.      Capillary Refill: Capillary refill takes less than 2 seconds.     Neurological:      Mental Status: She is alert and oriented to person, place, and time.      Motor: No weakness.      Coordination: Coordination is intact.      Gait: Gait normal.                        [1]   Current Outpatient Medications:     ondansetron (ZOFRAN) 4 mg tablet, Take 1 tablet (4 mg total) by mouth every 8 (eight) hours as needed for nausea or vomiting, Disp: 20 tablet, Rfl: 0    buPROPion (WELLBUTRIN XL) 300 mg 24 hr tablet, Take 1 tablet (300 mg total) by mouth every morning, Disp: 30 tablet, Rfl: 5    DULoxetine (CYMBALTA) 60 mg delayed release capsule, Take 1 capsule (60 mg total) by mouth every morning, Disp: 30 capsule, Rfl: 5    Levonorgestrel (MIRENA) 20 MCG/DAY IUD, 1 each by Intrauterine route once, Disp: , Rfl:     levothyroxine 100 mcg tablet, Take 1 tablet (100 mcg total) by mouth daily, Disp: 90 tablet, Rfl: 1    meloxicam (MOBIC) 7.5 mg tablet, , Disp: , Rfl:     valACYclovir (VALTREX) 500 mg tablet, if needed Takes when needed, Disp: , Rfl:     valACYclovir (VALTREX) 500 mg tablet, Take 1 tablet (500 mg total) by mouth daily, Disp: 90 tablet, Rfl: 2  No current facility-administered medications for this visit.  [2]   Past Medical History:  Diagnosis Date    Anxiety     Anxiety and depression     previously on Cymbalta, stopped before fall 2022 due to cost of visits. Currently no medication and self-manage.    Depression     Disease of thyroid gland     Factor 5 Leiden mutation, heterozygous (HCC)     Genital herpes     Last out break per pt months ago  (23). On Valtrex only w/outbreak.    History of loop electrical excision procedure (LEEP) 2019    Human papilloma virus     Hypothyroid     Currently on Levothyroxine 25mg and in the process of finding a new endocrinologist. Does not remember when last TSH check.    Miscarriage 22    This was my first pregnancy. The miscarriage started Friday, 22    PCOS (polycystic ovarian syndrome)     Currently on metformin 500 mg x2 daily.     delivery 2023    Precipitously delivered at 35w0d in [facility] upon arrival to hospital   [3]   Past Surgical History:  Procedure Laterality Date    CERVICAL BIOPSY  W/ LOOP ELECTRODE EXCISION      US GUIDED THYROID BIOPSY  2023    US GUIDED THYROID BIOPSY  2019    WISDOM TOOTH EXTRACTION     [4]   Family History  Problem Relation Name Age of Onset    Thyroid disease Mother Natalie Patel     Hypothyroidism Mother Natalie Patel     Depression Father Aidan Kidd     Cancer Father Aidan Kidd         Multiple Myloma    Anxiety disorder Father Aidan Kidd     Deep vein thrombosis Father Aidan Kidd

## 2025-06-19 NOTE — LETTER
June 19, 2025     Patient: Lupis Kidd   YOB: 1995   Date of Visit: 6/19/2025       To Whom it May Concern:    Lupis Kidd was seen in my clinic on 6/19/2025. She may return to work on 06/23/2025.    If you have any questions or concerns, please don't hesitate to call.         Sincerely,          CHUCHO Banda        CC: No Recipients

## 2025-06-19 NOTE — PATIENT INSTRUCTIONS
You have been given a Toradol shot today. This is an anti-inflammatory medication, similar to ibuprofen. Do not take any other NSAIDs for the next 8 hours. (EX: ibuprofen, Aleve, Motrin, Advil, meloxicam).     You may take Tylenol or Excedrin.     Take Zofran as needed as prescribed. You were given a dose here in the clinic at 4 pm.    Drink plenty of fluids to stay hydrated.    Follow-up with PCP in 3-5 days.    Go to the ED for any persistent or worsening symptoms.

## 2025-06-29 ENCOUNTER — APPOINTMENT (OUTPATIENT)
Dept: LAB | Facility: HOSPITAL | Age: 30
End: 2025-06-29
Payer: COMMERCIAL

## 2025-06-29 DIAGNOSIS — Z13.1 SCREENING FOR DIABETES MELLITUS: ICD-10-CM

## 2025-06-29 DIAGNOSIS — Z13.6 SCREENING FOR HYPERTENSION: ICD-10-CM

## 2025-06-29 DIAGNOSIS — Z00.00 ROUTINE GENERAL MEDICAL EXAMINATION AT A HEALTH CARE FACILITY: ICD-10-CM

## 2025-06-29 LAB
ALBUMIN SERPL BCG-MCNC: 4.5 G/DL (ref 3.5–5)
ALP SERPL-CCNC: 51 U/L (ref 34–104)
ALT SERPL W P-5'-P-CCNC: 35 U/L (ref 7–52)
ANION GAP SERPL CALCULATED.3IONS-SCNC: 6 MMOL/L (ref 4–13)
AST SERPL W P-5'-P-CCNC: 26 U/L (ref 13–39)
BILIRUB SERPL-MCNC: 0.95 MG/DL (ref 0.2–1)
BUN SERPL-MCNC: 9 MG/DL (ref 5–25)
CALCIUM SERPL-MCNC: 9.5 MG/DL (ref 8.4–10.2)
CHLORIDE SERPL-SCNC: 104 MMOL/L (ref 96–108)
CHOLEST SERPL-MCNC: 180 MG/DL (ref ?–200)
CO2 SERPL-SCNC: 28 MMOL/L (ref 21–32)
CREAT SERPL-MCNC: 0.73 MG/DL (ref 0.6–1.3)
GFR SERPL CREATININE-BSD FRML MDRD: 109 ML/MIN/1.73SQ M
GLUCOSE P FAST SERPL-MCNC: 100 MG/DL (ref 65–99)
HDLC SERPL-MCNC: 63 MG/DL
LDLC SERPL CALC-MCNC: 97 MG/DL (ref 0–100)
LDLC SERPL DIRECT ASSAY-MCNC: 107 MG/DL (ref 0–100)
NONHDLC SERPL-MCNC: 117 MG/DL
POTASSIUM SERPL-SCNC: 4.2 MMOL/L (ref 3.5–5.3)
PROT SERPL-MCNC: 7.3 G/DL (ref 6.4–8.4)
SODIUM SERPL-SCNC: 138 MMOL/L (ref 135–147)
T4 FREE SERPL-MCNC: 0.99 NG/DL (ref 0.61–1.12)
TRIGL SERPL-MCNC: 102 MG/DL (ref ?–150)
TSH SERPL DL<=0.05 MIU/L-ACNC: 11.17 UIU/ML (ref 0.45–4.5)

## 2025-06-29 PROCEDURE — 80061 LIPID PANEL: CPT

## 2025-06-29 PROCEDURE — 84439 ASSAY OF FREE THYROXINE: CPT

## 2025-06-29 PROCEDURE — 80053 COMPREHEN METABOLIC PANEL: CPT

## 2025-06-29 PROCEDURE — 83721 ASSAY OF BLOOD LIPOPROTEIN: CPT

## 2025-06-29 PROCEDURE — 36415 COLL VENOUS BLD VENIPUNCTURE: CPT

## 2025-06-29 PROCEDURE — 84443 ASSAY THYROID STIM HORMONE: CPT

## 2025-07-07 ENCOUNTER — OFFICE VISIT (OUTPATIENT)
Dept: ENDOCRINOLOGY | Facility: HOSPITAL | Age: 30
End: 2025-07-07
Payer: COMMERCIAL

## 2025-07-07 VITALS
HEART RATE: 109 BPM | WEIGHT: 186.2 LBS | BODY MASS INDEX: 32.99 KG/M2 | DIASTOLIC BLOOD PRESSURE: 80 MMHG | SYSTOLIC BLOOD PRESSURE: 122 MMHG | HEIGHT: 63 IN

## 2025-07-07 DIAGNOSIS — E28.2 POLYCYSTIC OVARIAN SYNDROME: ICD-10-CM

## 2025-07-07 DIAGNOSIS — E04.2 MULTINODULAR THYROID: ICD-10-CM

## 2025-07-07 DIAGNOSIS — E03.9 ACQUIRED HYPOTHYROIDISM: Primary | ICD-10-CM

## 2025-07-07 DIAGNOSIS — E03.8 SUBCLINICAL HYPOTHYROIDISM: ICD-10-CM

## 2025-07-07 PROCEDURE — 99214 OFFICE O/P EST MOD 30 MIN: CPT | Performed by: STUDENT IN AN ORGANIZED HEALTH CARE EDUCATION/TRAINING PROGRAM

## 2025-07-07 RX ORDER — SPIRONOLACTONE 50 MG/1
100 TABLET, FILM COATED ORAL 2 TIMES DAILY
Qty: 200 TABLET | Refills: 1 | Status: SHIPPED | OUTPATIENT
Start: 2025-07-07 | End: 2025-07-07 | Stop reason: SDUPTHER

## 2025-07-07 RX ORDER — SPIRONOLACTONE 50 MG/1
50 TABLET, FILM COATED ORAL 2 TIMES DAILY
Qty: 200 TABLET | Refills: 1 | Status: SHIPPED | OUTPATIENT
Start: 2025-07-07

## 2025-07-07 RX ORDER — LEVOTHYROXINE SODIUM 125 UG/1
125 TABLET ORAL DAILY
Qty: 60 TABLET | Refills: 1 | Status: SHIPPED | OUTPATIENT
Start: 2025-07-07

## 2025-07-07 NOTE — PROGRESS NOTES
Name: Lupis Kidd      : 1995      MRN: [id number]  Encounter Provider: Diane Zuleta MD  Encounter Date: 2025   [facility]    No chief complaint on file.  :  Assessment & Plan  Acquired hypothyroidism         Polycystic ovarian syndrome         Subclinical hypothyroidism         Multinodular thyroid         1.  Hypothyroidism, likely due to Hashimoto:- Currently post partum now.  Dose of levothyroxine increased during pregnancy, TSH was upto 174 post partum and hence increased dose from 50mcg to 75mcg and eventually had to increase to 100mcg. Most recent TSH was still high and hence increase to weight based dosing at 125mcg and repeat labs in 6 weeks. Do believe she has clinically progressed now     2. Multinodular thyroid- Non toxic, s/p FNA in  of right thyroid nodule- neg FNA and s/p FNA right/Left 2.6cm TR4 nodules , Right nodule benign but left was non diagnostic. Repeat US  was stable with 2 stable nodules with no change in size. Repeat US in 1 year- ordered (for a 2 year follow up)     3) PCOS:- Dx in , given irregular menses and hirsutism- most likely, PCOS mimickers are ruled out.   Menses- on IUD now  Hyperandrogenism- c/w Aldactone 50mg BID  Metabolic profile- Lipid and A1C  normal, current Lipid is normal. No recent A1C     RTC in 3 months    Pertinent Medical History     History of Present Illness     Lupis Kidd is a 30 y.o. female with a history of subclinical hypothyroidism on levothyroxine, multinodular thyroid s/p FNA of 0.9cm right thyroid nodule in 2019 with neg results, repeat US  showed 2.6cm TR4 nodule b/l s/p FNA  right nodule- neg FNA, left nodule was bethesda 1- non diagnostic. PCOS since age 21 who presents today for post partum hypothyroidism/PCOS management.      Subclinical hypothyroidism:- Dx with thyroid issue in , Started on 25mcg levothyroxine. Dose  inc during pregnancy to 112mcg. Since delivery had to reduce dose since last labs showed overreplacement. Dose reduced to 50mcg daily with repeat labs recommended which showed elevated TSH and hence we increased her dose to 75mcg and now on 100mcg. Since last visit?   Recent labs 6/25 TSH 11.1, T4 0.99     Dx of Non toxic Multinodular goiter:- Diagnosed based on exam-US done in 2018/2019, had FNA done twice as well but reports in file only for 1 right sided nodule 0.9cm in side- neg for malignancy in 2019. Repeat US 2023 showed growth in b/l nodules 2.6cm TR4 and therefore underwent FNA for both on 05/23. Right nodule benign but left sample was non diagnostic.  Hx of radiation to neck, family hx of thyroid cancer. Repeat US 2024 which shows stable nodule 2.3cm TR4.      Dx PCOS- age 21. Previously on OCP for menstral regulation, taken off this since was dx with factor 5 leiden def, started on IUD instead,  first pregnancy- naturally conceived. Previously on metformin but dx during pregnancy d/t normal BG. Never been on aldactone in past. Has some hair growth on face but manageable.  PCOS mimickers checked now and are normal   Menses:- on IUD now  Hyperandrogenism- Started on aldactone 50mg BID and did see some improvements but reports stopped it since last visit since didn't have any more refills. Does shave often.   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 Does not drink alcohol, no smoking anymore, no other drugs   Fhx:   thyroid disorder- Mother had thyroid issues. Possible PCOS in mother.     Review of Systems   Constitutional:  Negative for diaphoresis, fatigue and unexpected weight change.   Eyes:  Negative for photophobia and visual disturbance.   Gastrointestinal:  Negative for constipation, diarrhea and vomiting.   Endocrine: Negative for polydipsia and polyuria.   Skin: Negative.     as per HPI       Medical History  "Reviewed by provider this encounter:     .    Objective   /80   Pulse (!) 109   Ht 5' 3\" (1.6 m)   Wt 84.5 kg (186 lb 3.2 oz)   BMI 32.98 kg/m²      Body mass index is 32.98 kg/m².  Wt Readings from Last 3 Encounters:   07/07/25 84.5 kg (186 lb 3.2 oz)   06/19/25 87.5 kg (193 lb)   02/25/25 80.3 kg (177 lb)     Physical Exam  Constitutional:       Appearance: Normal appearance. She is obese.     Cardiovascular:      Rate and Rhythm: Normal rate.   Pulmonary:      Effort: Pulmonary effort is normal.   Abdominal:      General: Bowel sounds are normal.      Palpations: Abdomen is soft.     Skin:     General: Skin is warm.      Capillary Refill: Capillary refill takes less than 2 seconds.     Neurological:      General: No focal deficit present.      Mental Status: She is alert.     Psychiatric:         Mood and Affect: Mood normal.         Labs: I have reviewed pertinent labs including:   Lab Results   Component Value Date    HGBA1C 5.1 11/13/2023      Lab Results   Component Value Date    CREATININE 0.73 06/29/2025    CREATININE 0.75 02/02/2025    CREATININE 0.88 12/25/2024    BUN 9 06/29/2025    K 4.2 06/29/2025     06/29/2025    CO2 28 06/29/2025      eGFR   Date Value Ref Range Status   06/29/2025 109 ml/min/1.73sq m Final      HDL   Date Value Ref Range Status   11/13/2023 56 >39 mg/dL Final     HDL, Direct   Date Value Ref Range Status   06/29/2025 63 >=50 mg/dL Final     Comment:     HDL Cholesterol:       Low     <41 mg/dL     Triglycerides   Date Value Ref Range Status   06/29/2025 102 See Comment mg/dL Final     Comment:     Triglyceride:     0-9Y            <75mg/dL     10Y-17Y         <90 mg/dL       >=18Y     Normal          <150 mg/dL     Borderline High 150-199 mg/dL     High            200-499 mg/dL        Very High       >499 mg/dL    Specimen collection should occur prior to Metamizole administration due to the potential for falsely depressed results.   11/13/2023 107 0 - 149 mg/dL " "Final     T. Chol/HDL Ratio   Date Value Ref Range Status   11/13/2023 3.1 0.0 - 4.4 ratio Final     Comment:                                       T. Chol/HDL Ratio                                              Men  Women                                1/2 Avg.Risk  3.4    3.3                                    Avg.Risk  5.0    4.4                                 2X Avg.Risk  9.6    7.1                                 3X Avg.Risk 23.4   11.0        ALT   Date Value Ref Range Status   06/29/2025 35 7 - 52 U/L Final     Comment:     Specimen collection should occur prior to Sulfasalazine administration due to the potential for falsely depressed results.    07/01/2022 37 (H) 0 - 32 IU/L Final     AST   Date Value Ref Range Status   06/29/2025 26 13 - 39 U/L Final   07/01/2022 26 0 - 40 IU/L Final     Alkaline Phosphatase   Date Value Ref Range Status   06/29/2025 51 34 - 104 U/L Final      Lab Results   Component Value Date    [id number] 11.166 (H) 06/29/2025      Lab Results   Component Value Date    FREET4 0.99 06/29/2025      No results found for: \"[id number]\"   Radiology Results Review : No pertinent imaging studies reviewed.  There are no Patient Instructions on file for this visit.    Discussed with the patient and all questioned fully answered. She will call me if any problems arise.      "

## 2025-07-23 ENCOUNTER — OFFICE VISIT (OUTPATIENT)
Dept: FAMILY MEDICINE CLINIC | Facility: CLINIC | Age: 30
End: 2025-07-23
Payer: COMMERCIAL

## 2025-07-23 DIAGNOSIS — E03.9 ACQUIRED HYPOTHYROIDISM: ICD-10-CM

## 2025-07-23 DIAGNOSIS — E04.2 MULTINODULAR THYROID: ICD-10-CM

## 2025-07-23 DIAGNOSIS — F33.41 RECURRENT MAJOR DEPRESSIVE DISORDER, IN PARTIAL REMISSION (HCC): Primary | ICD-10-CM

## 2025-07-23 DIAGNOSIS — D68.51 FACTOR 5 LEIDEN MUTATION, HETEROZYGOUS (HCC): ICD-10-CM

## 2025-07-23 DIAGNOSIS — F41.9 ANXIETY: ICD-10-CM

## 2025-07-23 PROCEDURE — 99214 OFFICE O/P EST MOD 30 MIN: CPT | Performed by: FAMILY MEDICINE

## 2025-07-23 NOTE — PATIENT INSTRUCTIONS
There are other options we can try for depression- Viibryd or Effexor- they are both good choices - you can check with your coverage and let me know.

## 2025-07-23 NOTE — ASSESSMENT & PLAN NOTE
The patient is currently stable and will continue to follow-up with endocrinology as scheduled.

## 2025-07-23 NOTE — ASSESSMENT & PLAN NOTE
The patient is going to do some research as far as coverage for different antidepressants and will report back.  She is going to continue to follow-up with her therapist as scheduled.

## 2025-07-23 NOTE — PROGRESS NOTES
Name: Lupis Kidd      : 1995      MRN: [id number]  Encounter Provider: Yee Deal DO  Encounter Date: 2025   Encounter department: [facility]  :  Assessment & Plan  Recurrent major depressive disorder, in partial remission (HCC)  The patient reports that she stopped taking her Cymbalta and Wellbutrin because they no longer were giving her any relief of her depression symptoms.  We discussed trying her on a another alternative such as Viibryd or Effexor but she is very concerned about cost and wants to do some research as far as what is covered with her insurance.  She is going to look into this and will get back to us to discuss options.  For now she will continue to follow-up with her therapist as scheduled.         Anxiety  The patient is going to do some research as far as coverage for different antidepressants and will report back.  She is going to continue to follow-up with her therapist as scheduled.       Acquired hypothyroidism  The patient will continue with the current dose of her levothyroxine.  We have made no changes today.  We will see her back as scheduled.       Multinodular thyroid  The patient is currently stable and will continue to follow-up with endocrinology as scheduled.       Factor 5 Leiden mutation, heterozygous (HCC)  The patient is currently stable and has had no history of clots.             Depression Screening and Follow-up Plan: Patient was screened for depression during today's encounter. They screened negative with a PHQ-9 score of 0.        Chief Complaint   Patient presents with   • Follow-up     6 months review labs       History of Present Illness   Lupis Kidd is a 30 y.o. female who presents today for follow-up of her depression, anxiety, and subclinical hypothyroidism.  She continues to follow-up regularly with endocrinology for subclinical hypothyroidism and multinodular goiter.  She states the antidepressant  Patient called stating [facility] doesn't have her order for new machine, can you please contact them to see what is going on and call patient as soon as you are able to. Thank you   "stopped working- she stopped it in April- she stopped the Cymbalta and the Wellbutrin. She started seeing a therapist.  She is smoking again to help with the depression.  There is some anxiety- there is a lot of stress at work and with her .    There is helplessness and feeling lime she is failing her daughter.  She is sleeping okay and eating well.    The patient denies any chest pain, shortness of breath, or palpitations.  There is no APPIAH, PND, or orthopnea.  There is no edema.  There are no headaches or visual changes.  There is no lightheadedness, dizziness, or fainting spells.  FDLMP= 7/8/2025.        Anxiety  Presents for follow-up visit. Symptoms include depressed mood, excessive worry and nervous/anxious behavior. Patient reports no chest pain, compulsions, confusion, decreased concentration, dizziness, dry mouth, feeling of choking, hyperventilation, impotence, insomnia, irritability, malaise, muscle tension, nausea, obsessions, palpitations, panic, restlessness, shortness of breath or suicidal ideas.         Review of Systems   Constitutional: Negative.  Negative for irritability.   HENT: Negative.     Eyes: Negative.    Respiratory: Negative.  Negative for shortness of breath.    Cardiovascular: Negative.  Negative for chest pain and palpitations.   Gastrointestinal: Negative.  Negative for nausea.   Endocrine: Negative.    Genitourinary: Negative.  Negative for impotence.   Musculoskeletal: Negative.    Skin: Negative.    Allergic/Immunologic: Negative.    Neurological: Negative.  Negative for dizziness.   Hematological: Negative.    Psychiatric/Behavioral:  Negative for confusion, decreased concentration and suicidal ideas. The patient is nervous/anxious. The patient does not have insomnia.        Objective   /76 (BP Location: Left arm, Patient Position: Sitting, Cuff Size: Large)   Pulse 96   Temp 98.2 °F (36.8 °C) (Tympanic)   Resp 16   Ht 5' 3\" (1.6 m)   Wt 82.6 kg (182 lb 3.2 oz)  "  LMP  (LMP Unknown)   SpO2 97%   Breastfeeding No   BMI 32.28 kg/m²      Physical Exam  Constitutional:       General: She is not in acute distress.     Appearance: Normal appearance. She is well-developed. She is not diaphoretic.   HENT:      Head: Normocephalic and atraumatic.      Right Ear: Tympanic membrane, ear canal and external ear normal.      Left Ear: Tympanic membrane, ear canal and external ear normal.      Nose: Nose normal.      Mouth/Throat:      Mouth: Mucous membranes are moist.      Pharynx: Oropharynx is clear. No oropharyngeal exudate.     Eyes:      General: No scleral icterus.        Right eye: No discharge.         Left eye: No discharge.      Extraocular Movements: Extraocular movements intact.      Pupils: Pupils are equal, round, and reactive to light.     Neck:      Thyroid: No thyromegaly.      Vascular: No JVD.      Trachea: No tracheal deviation.     Cardiovascular:      Rate and Rhythm: Normal rate and regular rhythm.      Heart sounds: Normal heart sounds. No murmur heard.     No friction rub. No gallop.   Pulmonary:      Effort: Pulmonary effort is normal. No respiratory distress.      Breath sounds: Normal breath sounds. No wheezing or rales.   Chest:      Chest wall: No tenderness.   Abdominal:      General: Bowel sounds are normal. There is no distension.      Palpations: Abdomen is soft. There is no mass.      Tenderness: There is no abdominal tenderness. There is no guarding or rebound.      Hernia: No hernia is present.     Musculoskeletal:         General: No tenderness or deformity. Normal range of motion.      Cervical back: Normal range of motion and neck supple.   Lymphadenopathy:      Cervical: No cervical adenopathy.     Skin:     General: Skin is warm and dry.      Coloration: Skin is not pale.      Findings: No erythema or rash.     Neurological:      Mental Status: She is alert and oriented to person, place, and time.      Cranial Nerves: No cranial nerve  deficit.      Sensory: No sensory deficit.      Motor: No abnormal muscle tone.      Coordination: Coordination normal.      Deep Tendon Reflexes: Reflexes normal.     Psychiatric:         Mood and Affect: Mood normal.         Behavior: Behavior normal.         Thought Content: Thought content normal.         Judgment: Judgment normal.       Administrative Statements   I have spent a total time of 15 minutes in caring for this patient on the day of the visit/encounter including Prognosis, Risks and benefits of tx options, Instructions for management, Patient and family education, Importance of tx compliance, Risk factor reductions, Impressions, Counseling / Coordination of care, Documenting in the medical record, Reviewing/placing orders in the medical record (including tests, medications, and/or procedures), and Obtaining or reviewing history  .

## 2025-07-23 NOTE — ASSESSMENT & PLAN NOTE
The patient will continue with the current dose of her levothyroxine.  We have made no changes today.  We will see her back as scheduled.

## 2025-07-24 VITALS
OXYGEN SATURATION: 97 % | SYSTOLIC BLOOD PRESSURE: 122 MMHG | RESPIRATION RATE: 16 BRPM | DIASTOLIC BLOOD PRESSURE: 76 MMHG | BODY MASS INDEX: 32.28 KG/M2 | WEIGHT: 182.2 LBS | HEIGHT: 63 IN | TEMPERATURE: 98.2 F | HEART RATE: 96 BPM